# Patient Record
Sex: FEMALE | Race: WHITE | NOT HISPANIC OR LATINO | URBAN - METROPOLITAN AREA
[De-identification: names, ages, dates, MRNs, and addresses within clinical notes are randomized per-mention and may not be internally consistent; named-entity substitution may affect disease eponyms.]

---

## 2018-04-12 ENCOUNTER — INPATIENT (INPATIENT)
Facility: HOSPITAL | Age: 78
LOS: 3 days | Discharge: HOME | DRG: 872 | End: 2018-04-16
Attending: INTERNAL MEDICINE | Admitting: INTERNAL MEDICINE
Payer: MEDICARE

## 2018-04-12 VITALS
RESPIRATION RATE: 18 BRPM | OXYGEN SATURATION: 95 % | TEMPERATURE: 100 F | DIASTOLIC BLOOD PRESSURE: 69 MMHG | SYSTOLIC BLOOD PRESSURE: 114 MMHG | HEART RATE: 104 BPM

## 2018-04-12 DIAGNOSIS — Z29.9 ENCOUNTER FOR PROPHYLACTIC MEASURES, UNSPECIFIED: ICD-10-CM

## 2018-04-12 DIAGNOSIS — A41.9 SEPSIS, UNSPECIFIED ORGANISM: ICD-10-CM

## 2018-04-12 DIAGNOSIS — N39.0 URINARY TRACT INFECTION, SITE NOT SPECIFIED: ICD-10-CM

## 2018-04-12 DIAGNOSIS — E03.9 HYPOTHYROIDISM, UNSPECIFIED: ICD-10-CM

## 2018-04-12 DIAGNOSIS — Z90.49 ACQUIRED ABSENCE OF OTHER SPECIFIED PARTS OF DIGESTIVE TRACT: Chronic | ICD-10-CM

## 2018-04-12 LAB
ALBUMIN SERPL ELPH-MCNC: 3.6 G/DL — SIGNIFICANT CHANGE UP (ref 3.5–5)
ALP SERPL-CCNC: 69 U/L — SIGNIFICANT CHANGE UP (ref 40–120)
ALT FLD-CCNC: 33 U/L DA — SIGNIFICANT CHANGE UP (ref 10–60)
ANION GAP SERPL CALC-SCNC: 9 MMOL/L — SIGNIFICANT CHANGE UP (ref 5–17)
APPEARANCE UR: ABNORMAL
AST SERPL-CCNC: 24 U/L — SIGNIFICANT CHANGE UP (ref 10–40)
BASOPHILS # BLD AUTO: 0.1 K/UL — SIGNIFICANT CHANGE UP (ref 0–0.2)
BASOPHILS NFR BLD AUTO: 0.4 % — SIGNIFICANT CHANGE UP (ref 0–2)
BILIRUB SERPL-MCNC: 0.9 MG/DL — SIGNIFICANT CHANGE UP (ref 0.2–1.2)
BILIRUB UR-MCNC: NEGATIVE — SIGNIFICANT CHANGE UP
BUN SERPL-MCNC: 16 MG/DL — SIGNIFICANT CHANGE UP (ref 7–18)
CALCIUM SERPL-MCNC: 8.6 MG/DL — SIGNIFICANT CHANGE UP (ref 8.4–10.5)
CHLORIDE SERPL-SCNC: 97 MMOL/L — SIGNIFICANT CHANGE UP (ref 96–108)
CO2 SERPL-SCNC: 26 MMOL/L — SIGNIFICANT CHANGE UP (ref 22–31)
COLOR SPEC: YELLOW — SIGNIFICANT CHANGE UP
CREAT SERPL-MCNC: 1.06 MG/DL — SIGNIFICANT CHANGE UP (ref 0.5–1.3)
DIFF PNL FLD: ABNORMAL
EOSINOPHIL # BLD AUTO: 0 K/UL — SIGNIFICANT CHANGE UP (ref 0–0.5)
EOSINOPHIL NFR BLD AUTO: 0 % — SIGNIFICANT CHANGE UP (ref 0–6)
GLUCOSE SERPL-MCNC: 128 MG/DL — HIGH (ref 70–99)
GLUCOSE UR QL: NEGATIVE — SIGNIFICANT CHANGE UP
HCT VFR BLD CALC: 38.5 % — SIGNIFICANT CHANGE UP (ref 34.5–45)
HGB BLD-MCNC: 13.1 G/DL — SIGNIFICANT CHANGE UP (ref 11.5–15.5)
KETONES UR-MCNC: ABNORMAL
LACTATE SERPL-SCNC: 1 MMOL/L — SIGNIFICANT CHANGE UP (ref 0.7–2)
LEUKOCYTE ESTERASE UR-ACNC: ABNORMAL
LYMPHOCYTES # BLD AUTO: 0.6 K/UL — LOW (ref 1–3.3)
LYMPHOCYTES # BLD AUTO: 3.8 % — LOW (ref 13–44)
MCHC RBC-ENTMCNC: 33.5 PG — SIGNIFICANT CHANGE UP (ref 27–34)
MCHC RBC-ENTMCNC: 34 GM/DL — SIGNIFICANT CHANGE UP (ref 32–36)
MCV RBC AUTO: 98.4 FL — SIGNIFICANT CHANGE UP (ref 80–100)
MONOCYTES # BLD AUTO: 1.6 K/UL — HIGH (ref 0–0.9)
MONOCYTES NFR BLD AUTO: 10 % — SIGNIFICANT CHANGE UP (ref 2–14)
NEUTROPHILS # BLD AUTO: 13.8 K/UL — HIGH (ref 1.8–7.4)
NEUTROPHILS NFR BLD AUTO: 85.9 % — HIGH (ref 43–77)
NITRITE UR-MCNC: POSITIVE
PH UR: 6 — SIGNIFICANT CHANGE UP (ref 5–8)
PLATELET # BLD AUTO: 192 K/UL — SIGNIFICANT CHANGE UP (ref 150–400)
POTASSIUM SERPL-MCNC: 3.6 MMOL/L — SIGNIFICANT CHANGE UP (ref 3.5–5.3)
POTASSIUM SERPL-SCNC: 3.6 MMOL/L — SIGNIFICANT CHANGE UP (ref 3.5–5.3)
PROT SERPL-MCNC: 7.2 G/DL — SIGNIFICANT CHANGE UP (ref 6–8.3)
PROT UR-MCNC: 30 MG/DL
RAPID RVP RESULT: SIGNIFICANT CHANGE UP
RBC # BLD: 3.92 M/UL — SIGNIFICANT CHANGE UP (ref 3.8–5.2)
RBC # FLD: 11.6 % — SIGNIFICANT CHANGE UP (ref 10.3–14.5)
SODIUM SERPL-SCNC: 132 MMOL/L — LOW (ref 135–145)
SP GR SPEC: 1 — LOW (ref 1.01–1.02)
T4 AB SER-ACNC: 10.5 UG/DL — SIGNIFICANT CHANGE UP (ref 4.6–12)
TSH SERPL-MCNC: 1.01 UU/ML — SIGNIFICANT CHANGE UP (ref 0.34–4.82)
UROBILINOGEN FLD QL: NEGATIVE — SIGNIFICANT CHANGE UP
WBC # BLD: 16.1 K/UL — HIGH (ref 3.8–10.5)
WBC # FLD AUTO: 16.1 K/UL — HIGH (ref 3.8–10.5)

## 2018-04-12 PROCEDURE — 71046 X-RAY EXAM CHEST 2 VIEWS: CPT | Mod: 26

## 2018-04-12 PROCEDURE — 99285 EMERGENCY DEPT VISIT HI MDM: CPT

## 2018-04-12 RX ORDER — ACETAMINOPHEN 500 MG
975 TABLET ORAL ONCE
Qty: 0 | Refills: 0 | Status: COMPLETED | OUTPATIENT
Start: 2018-04-12 | End: 2018-04-12

## 2018-04-12 RX ORDER — SODIUM CHLORIDE 9 MG/ML
3 INJECTION INTRAMUSCULAR; INTRAVENOUS; SUBCUTANEOUS ONCE
Qty: 0 | Refills: 0 | Status: COMPLETED | OUTPATIENT
Start: 2018-04-12 | End: 2018-04-12

## 2018-04-12 RX ORDER — SODIUM CHLORIDE 9 MG/ML
1000 INJECTION INTRAMUSCULAR; INTRAVENOUS; SUBCUTANEOUS ONCE
Qty: 0 | Refills: 0 | Status: COMPLETED | OUTPATIENT
Start: 2018-04-12 | End: 2018-04-12

## 2018-04-12 RX ORDER — ATORVASTATIN CALCIUM 80 MG/1
10 TABLET, FILM COATED ORAL AT BEDTIME
Qty: 0 | Refills: 0 | Status: DISCONTINUED | OUTPATIENT
Start: 2018-04-12 | End: 2018-04-16

## 2018-04-12 RX ORDER — ENOXAPARIN SODIUM 100 MG/ML
40 INJECTION SUBCUTANEOUS DAILY
Qty: 0 | Refills: 0 | Status: DISCONTINUED | OUTPATIENT
Start: 2018-04-13 | End: 2018-04-16

## 2018-04-12 RX ORDER — SODIUM CHLORIDE 9 MG/ML
1000 INJECTION INTRAMUSCULAR; INTRAVENOUS; SUBCUTANEOUS
Qty: 0 | Refills: 0 | Status: DISCONTINUED | OUTPATIENT
Start: 2018-04-12 | End: 2018-04-16

## 2018-04-12 RX ORDER — KETOROLAC TROMETHAMINE 30 MG/ML
15 SYRINGE (ML) INJECTION ONCE
Qty: 0 | Refills: 0 | Status: DISCONTINUED | OUTPATIENT
Start: 2018-04-12 | End: 2018-04-12

## 2018-04-12 RX ORDER — SODIUM CHLORIDE 9 MG/ML
500 INJECTION INTRAMUSCULAR; INTRAVENOUS; SUBCUTANEOUS
Qty: 0 | Refills: 0 | Status: COMPLETED | OUTPATIENT
Start: 2018-04-12 | End: 2018-04-12

## 2018-04-12 RX ORDER — LEVOTHYROXINE SODIUM 125 MCG
50 TABLET ORAL DAILY
Qty: 0 | Refills: 0 | Status: DISCONTINUED | OUTPATIENT
Start: 2018-04-12 | End: 2018-04-16

## 2018-04-12 RX ORDER — ACETAMINOPHEN 500 MG
650 TABLET ORAL EVERY 6 HOURS
Qty: 0 | Refills: 0 | Status: DISCONTINUED | OUTPATIENT
Start: 2018-04-12 | End: 2018-04-16

## 2018-04-12 RX ORDER — CEFTRIAXONE 500 MG/1
1 INJECTION, POWDER, FOR SOLUTION INTRAMUSCULAR; INTRAVENOUS ONCE
Qty: 0 | Refills: 0 | Status: COMPLETED | OUTPATIENT
Start: 2018-04-12 | End: 2018-04-12

## 2018-04-12 RX ADMIN — SODIUM CHLORIDE 100 MILLILITER(S): 9 INJECTION INTRAMUSCULAR; INTRAVENOUS; SUBCUTANEOUS at 22:26

## 2018-04-12 RX ADMIN — SODIUM CHLORIDE 2000 MILLILITER(S): 9 INJECTION INTRAMUSCULAR; INTRAVENOUS; SUBCUTANEOUS at 18:40

## 2018-04-12 RX ADMIN — SODIUM CHLORIDE 2000 MILLILITER(S): 9 INJECTION INTRAMUSCULAR; INTRAVENOUS; SUBCUTANEOUS at 19:44

## 2018-04-12 RX ADMIN — SODIUM CHLORIDE 2000 MILLILITER(S): 9 INJECTION INTRAMUSCULAR; INTRAVENOUS; SUBCUTANEOUS at 20:39

## 2018-04-12 RX ADMIN — CEFTRIAXONE 100 GRAM(S): 500 INJECTION, POWDER, FOR SOLUTION INTRAMUSCULAR; INTRAVENOUS at 19:34

## 2018-04-12 RX ADMIN — SODIUM CHLORIDE 2000 MILLILITER(S): 9 INJECTION INTRAMUSCULAR; INTRAVENOUS; SUBCUTANEOUS at 19:34

## 2018-04-12 RX ADMIN — Medication 975 MILLIGRAM(S): at 19:13

## 2018-04-12 RX ADMIN — SODIUM CHLORIDE 3 MILLILITER(S): 9 INJECTION INTRAMUSCULAR; INTRAVENOUS; SUBCUTANEOUS at 19:13

## 2018-04-12 RX ADMIN — SODIUM CHLORIDE 2000 MILLILITER(S): 9 INJECTION INTRAMUSCULAR; INTRAVENOUS; SUBCUTANEOUS at 19:18

## 2018-04-12 NOTE — H&P ADULT - PROBLEM SELECTOR PLAN 2
-Meets SIRS criteria with known UTI  -c/w Rocephin, f/u urine and blood culture result  -s/p 3L IV bolus in ED, c/w IV maintenance  -Tylenol PRN for fever -Meets SIRS criteria with known UTI  -c/w Levaquin(concern for cross reaction/allergy to Rocephin), f/u urine and blood culture result  -s/p 3L IV bolus in ED, c/w IV maintenance  -Tylenol PRN for fever

## 2018-04-12 NOTE — H&P ADULT - ASSESSMENT
Patient is a 77y old  Female who presents with a chief complaint of Chills and rigors, urinary burning sense (12 Apr 2018 22:32). Is admitted to the medicine floor for UTI sepsis.

## 2018-04-12 NOTE — H&P ADULT - PROBLEM SELECTOR PLAN 4
-IMPROVE VTE Individual Risk Assessment          RISK                                                          Points  [  ] Previous VTE                                                3  [  ] Thrombophilia                                             2  [  ] Lower limb paralysis                                   2        (unable to hold up >15 seconds)    [  ] Current Cancer                                            2         (within 6 months)  [ x ] Immobilization > 24 hrs                             1  [  ] ICU/CCU stay > 24 hours                           1  [ x ] Age > 60                                                       1  IMPROVE VTE Score ____2_____  -Lovenox subcu for daily for DVT PPx.

## 2018-04-12 NOTE — ED PROVIDER NOTE - OBJECTIVE STATEMENT
77 year old female came to the ED because of a fever. Approximately 1 week ago she noted discomfort on urination with urgency. 1 night ago she woke up with chills and continues to have some chills today and noted a fever at home and feels generally weak. No vomiting, no nausea, no abd pain, no cough, no chest pain, no sob, no headache.

## 2018-04-12 NOTE — H&P ADULT - HISTORY OF PRESENT ILLNESS
77 years old female from home, walks in dependently, lives with , with PMH of hypothyroidism, hyperlipidemia, and h/o bronchial pneumonia presented to ED c/o chills and rigors for 2 days, and 1 week of stinging urinary sense. Pt has been feeling tired and weak for the last 1 week. 2 nights ago she had shaking chills with fever(100.4F) which was mildly subsided, and last night symptoms came back again so she decided to come to the hospital. Denies chest pain, SOB, abdominal pain, diarrhea, cough, rhinorrhea, or any other symptoms.    In ED, pt had fever of 102F with rectal temp, tachycardia of 104, with borderline BP- s/p 3L NS bolus in ED. Labs significant for WBC of 16.1K with left shift, mild hyponatremia of 132, UA grossly positive. RVP; negative, CXR; emphysema. Is admitted to the medicine floor for UTI sepsis. Pt received 1g of IV Rocephin in ED. 77 years old female from home, walks in dependently, lives with , with PMH of hypothyroidism, hyperlipidemia, and h/o bronchial pneumonia presented to ED c/o chills and rigors for 2 days, and 1 week of stinging urinary sense. Pt has been feeling tired and weak for the last 1 week. 2 nights ago she had shaking chills with fever(100.4F) which was mildly subsided, and last night symptoms came back again so she decided to come to the hospital. Denies chest pain, SOB, abdominal pain, diarrhea, cough, rhinorrhea, or any other symptoms.    In ED, pt had fever of 102F with rectal temp, tachycardia of 104, with borderline BP- s/p 3L NS bolus in ED. Labs significant for WBC of 16.1K with left shift, mild hyponatremia of 132, UA grossly positive. RVP; negative, CXR; emphysema. Is admitted to the medicine floor for UTI sepsis. Pt received 1g of IV Rocephin in ED.    ** Primary team please complete med rec when  brings home med, pt does not know the dose of medications so meds were started at the lowest dose.

## 2018-04-12 NOTE — H&P ADULT - NSHPOUTPATIENTPROVIDERS_GEN_ALL_CORE
PMD in [facility], George Hernandez PMD in [facility], [facility]

## 2018-04-12 NOTE — H&P ADULT - PROBLEM SELECTOR PLAN 1
-Meets SIRS criteria with known UTI  -c/w Rocephin, f/u urine and blood culture result  -s/p 3L IV bolus in ED, c/w IV maintenance  -Check renal sono for mild Lt CVAT(+)  -Tylenol PRN for fever -Meets SIRS criteria with known UTI. s/p 1g Rocephin IV in ED.  -c/w Levaquin(concern for cross reaction/allergy to Rocephin), f/u urine and blood culture result  -s/p 3L IV bolus in ED, c/w IV maintenance  -Check renal sono for mild Lt CVAT(+)  -Tylenol PRN for fever

## 2018-04-12 NOTE — H&P ADULT - ATTENDING COMMENTS
Above  noted 77 years old female from home, walks in dependently, lives with , with PMH of hypothyroidism, hyperlipidemia, and h/o bronchial pneumonia presented to ED c/o chills and rigors for 2 days, and 1 week of stinging urinary sense. Pt has been feeling tired and weak for the last 1 week. 2 nights ago she had shaking chills with fever(100.4F) which was mildly subsided, and last night symptoms came back again so she decided to come to the hospital. Denies chest pain, SOB, abdominal pain, diarrhea, cough, rhinorrhea, or any other symptoms.    In ED, pt had fever of 102F with rectal temp, tachycardia of 104, with borderline BP- s/p 3L NS bolus in ED. Labs significant for WBC of 16.1K with left shift, mild hyponatremia of 132, UA grossly positive. RVP; negative, CXR; emphysema. Is admitted to the medicine floor for UTI sepsis. Pt received 1g of IV Rocephin in ED.  Blood  tests radiology report reviewed    Impression Sepsis  UTI    Hypotension Hyponatremia    Hypothyroidism   Emphysema  remission   HLD   Admit to the  floor  patient  hemodynamically stabilized after  fluids resuscitation and  IV Rocephine   follow  septic work up resume  home  meds  GI DVT prophylaxis  check abd  US

## 2018-04-12 NOTE — H&P ADULT - PROBLEM SELECTOR PLAN 3
-Check TSH, and continue home dose synthroid  -Primary team please check home dose of synthroid, empirically started at 50mcg.

## 2018-04-12 NOTE — H&P ADULT - NSHPPHYSICALEXAM_GEN_ALL_CORE
PHYSICAL EXAM:  GENERAL: NAD, well-groomed, well-developed  HEAD:  Atraumatic, Normocephalic  EYES: EOMI, PERRLA, conjunctiva and sclera clear  ENMT: No tonsillar erythema, exudates, or enlargement; Moist mucous membranes, Good dentition, No lesions  NECK: Supple, No JVD, Normal thyroid  NERVOUS SYSTEM:  Alert & Oriented X3, Good concentration; Motor Strength 5/5 B/L upper and lower extremities  CHEST/LUNG: Clear to percussion bilaterally; No rales, rhonchi, wheezing, or rubs  HEART: Regular rate and rhythm; No murmurs, rubs, or gallops  ABDOMEN: Soft, Nontender, Nondistended; Bowel sounds present/ Left CVA tenderness(+)  EXTREMITIES:  2+ Peripheral Pulses bilaterally, No clubbing, cyanosis, or edema  LYMPH: No lymphadenopathy noted  SKIN: No rashes or lesions    T(C): 36.8 (04-12-18 @ 20:23), Max: 38.9 (04-12-18 @ 18:57)  HR: 70 (04-12-18 @ 20:23) (70 - 104)  BP: 106/51 (04-12-18 @ 20:23) (106/51 - 114/69)  RR: 16 (04-12-18 @ 20:23) (16 - 18)  SpO2: 96% (04-12-18 @ 20:23) (95% - 96%)  Wt(kg): --  I&O's Summary

## 2018-04-12 NOTE — H&P ADULT - NSHPLABSRESULTS_GEN_ALL_CORE
LABS:                        13.1   16.1  )-----------( 192      ( 2018 19:03 )             38.5     04-12    132<L>  |  97  |  16  ----------------------------<  128<H>  3.6   |  26  |  1.06    Ca    8.6      2018 19:09    TPro  7.2  /  Alb  3.6  /  TBili  0.9  /  DBili  x   /  AST  24  /  ALT  33  /  AlkPhos  69  04-12      Urinalysis Basic - ( 2018 18:56 )    Color: Yellow / Appearance: Slightly Turbid / S.005 / pH: x  Gluc: x / Ketone: Small  / Bili: Negative / Urobili: Negative   Blood: x / Protein: 30 mg/dL / Nitrite: Positive   Leuk Esterase: Moderate / RBC: 0-2 /HPF / WBC >50 /HPF   Sq Epi: x / Non Sq Epi: Few /HPF / Bacteria: Moderate /HPF      CAPILLARY BLOOD GLUCOSE        LIVER FUNCTIONS - ( 2018 19:09 )  Alb: 3.6 g/dL / Pro: 7.2 g/dL / ALK PHOS: 69 U/L / ALT: 33 U/L DA / AST: 24 U/L / GGT: x    < from: Xray Chest 2 Views PA/Lat (18 @ 19:04) >      FINDINGS:  Lungs are hyperinflated.  No focal lung consolidation. Biapical pleural parenchymal scarring.  No pneumothorax or pleural effusion.   The cardiomediastinal silhouette is within normal limits in size.  Moderate levoscoliosis of the thoracolumbar spine with apex at T12-L1.    IMPRESSION:  No focal lung consolidation. Hyperinflation/emphysema.    < end of copied text >

## 2018-04-13 ENCOUNTER — TRANSCRIPTION ENCOUNTER (OUTPATIENT)
Age: 78
End: 2018-04-13

## 2018-04-13 DIAGNOSIS — R78.81 BACTEREMIA: ICD-10-CM

## 2018-04-13 DIAGNOSIS — E78.5 HYPERLIPIDEMIA, UNSPECIFIED: ICD-10-CM

## 2018-04-13 LAB
ANION GAP SERPL CALC-SCNC: 7 MMOL/L — SIGNIFICANT CHANGE UP (ref 5–17)
BUN SERPL-MCNC: 13 MG/DL — SIGNIFICANT CHANGE UP (ref 7–18)
CALCIUM SERPL-MCNC: 7.8 MG/DL — LOW (ref 8.4–10.5)
CHLORIDE SERPL-SCNC: 106 MMOL/L — SIGNIFICANT CHANGE UP (ref 96–108)
CHOLEST SERPL-MCNC: 84 MG/DL — SIGNIFICANT CHANGE UP (ref 10–199)
CO2 SERPL-SCNC: 24 MMOL/L — SIGNIFICANT CHANGE UP (ref 22–31)
CREAT SERPL-MCNC: 0.81 MG/DL — SIGNIFICANT CHANGE UP (ref 0.5–1.3)
E COLI DNA BLD POS QL NAA+NON-PROBE: SIGNIFICANT CHANGE UP
FOLATE SERPL-MCNC: >20 NG/ML — SIGNIFICANT CHANGE UP (ref 4.8–24.2)
GLUCOSE SERPL-MCNC: 110 MG/DL — HIGH (ref 70–99)
GRAM STN FLD: SIGNIFICANT CHANGE UP
HBA1C BLD-MCNC: 5.7 % — HIGH (ref 4–5.6)
HCT VFR BLD CALC: 31.9 % — LOW (ref 34.5–45)
HDLC SERPL-MCNC: 41 MG/DL — SIGNIFICANT CHANGE UP (ref 40–125)
HGB BLD-MCNC: 10.8 G/DL — LOW (ref 11.5–15.5)
LIPID PNL WITH DIRECT LDL SERPL: 35 MG/DL — SIGNIFICANT CHANGE UP
LYMPHOCYTES # BLD AUTO: 5 % — LOW (ref 13–44)
MAGNESIUM SERPL-MCNC: 2.2 MG/DL — SIGNIFICANT CHANGE UP (ref 1.6–2.6)
MCHC RBC-ENTMCNC: 33.5 PG — SIGNIFICANT CHANGE UP (ref 27–34)
MCHC RBC-ENTMCNC: 33.8 GM/DL — SIGNIFICANT CHANGE UP (ref 32–36)
MCV RBC AUTO: 99.1 FL — SIGNIFICANT CHANGE UP (ref 80–100)
METHOD TYPE: SIGNIFICANT CHANGE UP
MONOCYTES NFR BLD AUTO: 12 % — SIGNIFICANT CHANGE UP (ref 2–14)
NEUTROPHILS NFR BLD AUTO: 83 % — HIGH (ref 43–77)
PHOSPHATE SERPL-MCNC: 2.1 MG/DL — LOW (ref 2.5–4.5)
PLATELET # BLD AUTO: 162 K/UL — SIGNIFICANT CHANGE UP (ref 150–400)
POTASSIUM SERPL-MCNC: 3.6 MMOL/L — SIGNIFICANT CHANGE UP (ref 3.5–5.3)
POTASSIUM SERPL-SCNC: 3.6 MMOL/L — SIGNIFICANT CHANGE UP (ref 3.5–5.3)
RBC # BLD: 3.22 M/UL — LOW (ref 3.8–5.2)
RBC # FLD: 11.8 % — SIGNIFICANT CHANGE UP (ref 10.3–14.5)
SODIUM SERPL-SCNC: 137 MMOL/L — SIGNIFICANT CHANGE UP (ref 135–145)
SPECIMEN SOURCE: SIGNIFICANT CHANGE UP
TOTAL CHOLESTEROL/HDL RATIO MEASUREMENT: 2 RATIO — LOW (ref 3.3–7.1)
TRIGL SERPL-MCNC: 39 MG/DL — SIGNIFICANT CHANGE UP (ref 10–149)
TSH SERPL-MCNC: 1.92 UU/ML — SIGNIFICANT CHANGE UP (ref 0.34–4.82)
VIT B12 SERPL-MCNC: 587 PG/ML — SIGNIFICANT CHANGE UP (ref 232–1245)
WBC # BLD: 13.4 K/UL — HIGH (ref 3.8–10.5)
WBC # FLD AUTO: 13.4 K/UL — HIGH (ref 3.8–10.5)

## 2018-04-13 PROCEDURE — 76775 US EXAM ABDO BACK WALL LIM: CPT | Mod: 26

## 2018-04-13 RX ORDER — VANCOMYCIN HCL 1 G
1000 VIAL (EA) INTRAVENOUS ONCE
Qty: 0 | Refills: 0 | Status: COMPLETED | OUTPATIENT
Start: 2018-04-13 | End: 2018-04-13

## 2018-04-13 RX ORDER — IPRATROPIUM/ALBUTEROL SULFATE 18-103MCG
3 AEROSOL WITH ADAPTER (GRAM) INHALATION EVERY 6 HOURS
Qty: 0 | Refills: 0 | Status: DISCONTINUED | OUTPATIENT
Start: 2018-04-13 | End: 2018-04-13

## 2018-04-13 RX ORDER — IBUPROFEN 200 MG
200 TABLET ORAL ONCE
Qty: 0 | Refills: 0 | Status: COMPLETED | OUTPATIENT
Start: 2018-04-13 | End: 2018-04-13

## 2018-04-13 RX ORDER — IPRATROPIUM/ALBUTEROL SULFATE 18-103MCG
3 AEROSOL WITH ADAPTER (GRAM) INHALATION EVERY 6 HOURS
Qty: 0 | Refills: 0 | Status: DISCONTINUED | OUTPATIENT
Start: 2018-04-13 | End: 2018-04-14

## 2018-04-13 RX ORDER — AZTREONAM 2 G
1000 VIAL (EA) INJECTION ONCE
Qty: 0 | Refills: 0 | Status: COMPLETED | OUTPATIENT
Start: 2018-04-13 | End: 2018-04-13

## 2018-04-13 RX ORDER — AZTREONAM 2 G
VIAL (EA) INJECTION
Qty: 0 | Refills: 0 | Status: DISCONTINUED | OUTPATIENT
Start: 2018-04-13 | End: 2018-04-13

## 2018-04-13 RX ORDER — ACETAMINOPHEN 500 MG
1000 TABLET ORAL ONCE
Qty: 0 | Refills: 0 | Status: COMPLETED | OUTPATIENT
Start: 2018-04-13 | End: 2018-04-13

## 2018-04-13 RX ORDER — AZTREONAM 2 G
1000 VIAL (EA) INJECTION EVERY 8 HOURS
Qty: 0 | Refills: 0 | Status: DISCONTINUED | OUTPATIENT
Start: 2018-04-13 | End: 2018-04-16

## 2018-04-13 RX ADMIN — Medication 50 MILLIGRAM(S): at 21:41

## 2018-04-13 RX ADMIN — Medication 200 MILLIGRAM(S): at 06:07

## 2018-04-13 RX ADMIN — Medication 650 MILLIGRAM(S): at 02:49

## 2018-04-13 RX ADMIN — ATORVASTATIN CALCIUM 10 MILLIGRAM(S): 80 TABLET, FILM COATED ORAL at 21:41

## 2018-04-13 RX ADMIN — Medication 250 MILLIGRAM(S): at 12:16

## 2018-04-13 RX ADMIN — Medication 50 MILLIGRAM(S): at 13:37

## 2018-04-13 RX ADMIN — Medication 50 MICROGRAM(S): at 05:39

## 2018-04-13 RX ADMIN — ENOXAPARIN SODIUM 40 MILLIGRAM(S): 100 INJECTION SUBCUTANEOUS at 12:16

## 2018-04-13 RX ADMIN — Medication 200 MILLIGRAM(S): at 07:11

## 2018-04-13 RX ADMIN — Medication 400 MILLIGRAM(S): at 07:39

## 2018-04-13 NOTE — CONSULT NOTE ADULT - PROBLEM SELECTOR RECOMMENDATION 2
1- UA & CS.  2- Blood culture.  3- Renal and bladder sonogram result is noted.  4- Azactam1 gm IVPB q 8 hours.  5- IV and PO hydration.  6- CBC and BMP follow up.

## 2018-04-13 NOTE — DISCHARGE NOTE ADULT - HOSPITAL COURSE
77 years old female from home, walks in [facility], lives with , with PMH of hypothyroidism, hyperlipidemia, and h/o bronchial pneumonia presented to ED 4/12/18 c/o chills and rigors for 2 days, and 1 week of stinging urinary sense. Pt had been feeling tired and weak for 1 week. 2 nights prior she had shaking chills with fever(100.4F) which was mildly subsided, and the night before admission the chills came back again so she decided to come to the hospital. Denies chest pain, SOB, abdominal pain, diarrhea, cough, rhinorrhea, or any other symptoms.    ED course:  In ED, pt had fever of 102F with rectal temp, tachycardia of 104, with borderline BP- s/p 3L NS bolus in ED. Labs significant for WBC of 16.1K with left shift, mild hyponatremia of 132. Pt received 1g of IV Rocephin in ED.    Hospital Course:  Patient was admitted for sepsis secondary to UTI with suspected pyelonephritis. Her UA showed leukocyte esterase, WBC and nitrites. Her RVP was negative and her CXR showed no consolidation but was significant for emphysema. She had a renal ultrasound that was unremarkable. She was started on levofloxacin and then switched to vancomycin and aztreonam to broaden coverage because patient was continuing to spike fevers. Her blood cultures grew E. Coli.   Pt has history of hypothyroid, her TSH was 1.01 on admission and her home dose of Synthroid was continued.   Pt has history of hyperlipidemia and her home dose of atorvastatin 20 mg was continued. Lipid panel was wnl. 77 years old female from home, walks in dependently, lives with , with PMH of hypothyroidism, hyperlipidemia, and h/o bronchial pneumonia presented to ED 4/12/18 c/o chills and rigors for 2 days, and 1 week of stinging urinary sense. Pt had been feeling tired and weak for 1 week. 2 nights prior she had shaking chills with fever(100.4F) which was mildly subsided, and the night before admission the chills came back again so she decided to come to the hospital. Denies chest pain, SOB, abdominal pain, diarrhea, cough, rhinorrhea, or any other symptoms.    ED course:  In ED, pt had fever of 102F with rectal temp, tachycardia of 104, with borderline BP- s/p 3L NS bolus in ED. Labs significant for WBC of 16.1K with left shift, mild hyponatremia of 132. Pt received 1g of IV Rocephin in ED.    Hospital Course:  Patient was admitted for sepsis secondary to UTI with suspected pyelonephritis. Her UA showed leukocyte esterase, WBC and nitrites. Her RVP was negative and her CXR showed no consolidation but was significant for emphysema. She had a renal ultrasound that was unremarkable. She was started on levofloxacin and then switched to aztreonam to broaden coverage because patient was continuing to spike fevers. Her blood cultures grew E. Coli.   E. coli was haley sensitive. Patient will be discharged on cipro 500 q12 to complete 2 weeks of antibiotics.   Pt has history of hypothyroid, her TSH was 1.01 on admission and her home dose of Synthroid was continued.   Pt has history of hyperlipidemia and her home dose of atorvastatin 20 mg was continued. Lipid panel was wnl.    After stabilization , decision was made to discharge the patient.

## 2018-04-13 NOTE — DISCHARGE NOTE ADULT - CARE PLAN
Principal Discharge DX:	Sepsis  Goal:	Resolution of infection  Assessment and plan of treatment:	You came in with sepsis , likely secondary to UTI. Clinically you had tenderness on costovertebral angle and a positive Ua and were presumed to have pyelonephritis. You were started on Levaquin, then changed to broader coverage , aztreonam and vancomycin. Blood cultures were positive for ___ . You completed course of iv antibiotics. Continue with ___ on discharge .  Repeat blood cultures were negative.  Follow up with PCP in 1 week of discharge.  Secondary Diagnosis:	Hypothyroid  Assessment and plan of treatment:	Continue with home dose.  Tsh wnl.  Secondary Diagnosis:	HLD (hyperlipidemia)  Assessment and plan of treatment:	Continue with statin at home dose.  Lipid profile wnl. Principal Discharge DX:	Sepsis  Goal:	Resolution of infection  Assessment and plan of treatment:	You came in with sepsis , likely secondary to UTI. Clinically you had tenderness on costovertebral angle and a positive Ua and were presumed to have pyelonephritis. You were started on Levaquin, then changed to broader coverage , aztreonam and vancomycin. Blood cultures were positive for E. coli . You completed course of iv antibiotics. Continue with ciprofloxacin 500 q12 on discharge till 4/25 .  Repeat blood cultures were negative.  Follow up with PCP in 1 week of discharge.  Secondary Diagnosis:	Hypothyroid  Assessment and plan of treatment:	Continue with home dose.  Tsh wnl.  Secondary Diagnosis:	HLD (hyperlipidemia)  Assessment and plan of treatment:	Continue with statin at home dose.  Lipid profile wnl.

## 2018-04-13 NOTE — DISCHARGE NOTE ADULT - PATIENT PORTAL LINK FT
You can access the [facility], offered by [facility], by registering with the following website: [email or web address]

## 2018-04-13 NOTE — CONSULT NOTE ADULT - PROBLEM SELECTOR RECOMMENDATION 9
1- Repeat blood cultures.  2- Continue Azactam 1 gm IVPB q 8 hours.  3- Follow blood cultures till final report.

## 2018-04-13 NOTE — CONSULT NOTE ADULT - SUBJECTIVE AND OBJECTIVE BOX
HPI:  77 years old female from home, walks in dependently, lives with , with PMH of hypothyroidism, hyperlipidemia, and h/o bronchial pneumonia presented to ED c/o chills and rigors for 2 days, and 1 week of stinging urinary sense. Pt has been feeling tired and weak for the last 1 week. 2 nights ago she had shaking chills with fever(100.4F) which was mildly subsided, and last night symptoms came back again so she decided to come to the hospital. Denies chest pain, SOB, abdominal pain, diarrhea, cough, rhinorrhea, or any other symptoms.    In ED, pt had fever of 102F with rectal temp, tachycardia of 104, with borderline BP- s/p 3L NS bolus in ED. Labs significant for WBC of 16.1K with left shift, mild hyponatremia of 132, UA grossly positive. RVP; negative, CXR; emphysema. Is admitted to the medicine floor for UTI sepsis. Pt received 1g of IV Rocephin in ED.    ** Primary team please complete med rec when  brings home med, pt does not know the dose of medications so meds were started at the lowest dose. (2018 22:32)      PAST MEDICAL & SURGICAL HISTORY:  Pneumonia  Hypothyroid  S/P appendectomy      amoxicillin (Rash)      acetaminophen   Tablet 650 milliGRAM(s) Oral every 6 hours PRN  ALBUTerol/ipratropium for Nebulization 3 milliLiter(s) Nebulizer every 6 hours  atorvastatin 10 milliGRAM(s) Oral at bedtime  aztreonam  IVPB 1000 milliGRAM(s) IV Intermittent every 8 hours  enoxaparin Injectable 40 milliGRAM(s) SubCutaneous daily  levothyroxine 50 MICROGram(s) Oral daily  sodium chloride 0.9%. 1000 milliLiter(s) IV Continuous <Continuous>      Social Hx:    FAMILY HISTORY:  Family history of emphysema (Mother)        ROS  [  ] UNABLE TO ELICIT    General:  [  ] None  [  ] Fever  [  ] Chills  [  ] Malaise    Skin:  [  ] None [  ] Rash  [  ] Wound  [  ] Ulcer    HEENT:  [  ] None  [  ] Sore Throat  [  ] Nasal congestion/ runny nose  [  ] Photophobia [  ] Neck pain      Chest:  [  ] None   [  ] SOB  [  ] Cough  [  ] None    Cardiovascular:   [  ] None  [  ] CP  [  ] Palpitation    Gastrointestinal:  [  ] None  [  ] Abd pain   [  ] Nausea    [  ] Vomiting   [  ] Diarrhea	     Genitourinary:  [  ] None [  ] Polyuria   [  ] Urgency  [  ] Frequency  [  ] Dysuria    [  ]  Hematuria       Musculoskeletal:  [  ] None [  ] Back Pain	[  ] Body aches  [  ] Joint pain    Neurological: [  ] None [  ]Dizziness  [  ]Visual Disturbance  [  ]Headaches   [  ] Weakness      PHYSICAL EXAM:    Vital Signs Last 24 Hrs  T(C): 37.3 (2018 12:04), Max: 38.9 (2018 18:57)  T(F): 99.1 (2018 12:04), Max: 102 (2018 18:57)  HR: 66 (2018 07:47) (66 - 104)  BP: 103/55 (2018 07:47) (100/55 - 121/51)  BP(mean): --  RR: 16 (2018 07:47) (16 - 18)  SpO2: 97% (2018 07:47) (95% - 99%)    Constitutional:    HEENT: [  ] Wnl  [  ] Pharyngeal congestion    Neck:  [  ] Supple  [  ]Lymphadenopathy  [  ] No JVD   [  ] JVD  [  ] Masses   [  ] WNL    CHEST/Respiratory:  [  ]Clear to auscultation  [  ] Rales   [  ] Rhonchi   [  ] Wheezing     [  ] Chest Tenderness      Cardiovascular:  [  ] Reg S1 S2   [  ] Irreg S1 S2   [  ]No Murmur  [  ] +ve Murmurs  [  ]Systolic [  ]Diastolic      Abdomen:  [  ] Soft  [  ] No tendrerness  [  ] Tenderness  [  ] Organomegaly  [  ] ABD Distention  [  ] Rigidity                       [  ] No Regidity                       [  ] No Rebound Tenderness  [  ] No Guarding Rigidity  [  ] Rebound Tenderness[  ] Guarding Rigidity                          [  ]  +ve Bowel Sounds  [  ] Decreased Bowel Sounds    [  ] Absent Bowel Sounds                            Extremities: [  ] No edema [  ] Edema  [  ] Clubbing   [  ] Cyanosis                         [  ] No Tender Calf muscles  [  ] Tender Calf muscles                        [  ] Palpable peripheral pulses    Neurological: [  ] Awake  [  ] Alert  [  ] Oriented  x                             [  ] Confused  [  ] Drowsy  [  ] respond to painful stimuli  [  ] Unresponsive    Skin:  [  ] Intact [  ] Redness [  ] Thrombophlebitis  [  ] Rashes  [  ] Dry  [  ] Ulcers    Ortho:  [  ] Joint Swelling  [  ] Joint erythema [  ] Joint tenderness                [  ] Increased temp. to touch  [  ] DJD [  ] WNL      LABS/DIAGNOSTIC TESTS                          10.8   13.4  )-----------( 162      ( 2018 06:37 )             31.9     WBC Count: 13.4 K/uL ( @ 06:37)  WBC Count: 16.1 K/uL ( @ 19:03)          137  |  106  |  13  ----------------------------<  110<H>  3.6   |  24  |  0.81    Ca    7.8<L>      2018 06:37  Phos  2.1       Mg     2.2         TPro  7.2  /  Alb  3.6  /  TBili  0.9  /  DBili  x   /  AST  24  /  ALT  33  /  AlkPhos  69        Urinalysis Basic - ( 2018 18:56 )    Color: Yellow / Appearance: Slightly Turbid / S.005 / pH: x  Gluc: x / Ketone: Small  / Bili: Negative / Urobili: Negative   Blood: x / Protein: 30 mg/dL / Nitrite: Positive   Leuk Esterase: Moderate / RBC: 0-2 /HPF / WBC >50 /HPF   Sq Epi: x / Non Sq Epi: Few /HPF / Bacteria: Moderate /HPF        LIVER FUNCTIONS - ( 2018 19:09 )  Alb: 3.6 g/dL / Pro: 7.2 g/dL / ALK PHOS: 69 U/L / ALT: 33 U/L DA / AST: 24 U/L / GGT: x                 LACTATE:Lactate, Blood: 1.0 mmol/L ( @ 19:08)        CULTURES:   Culture - Blood (18 @ 23:34)    Gram Stain:   Growth in aerobic bottle: Gram Negative Rods  Growth in anaerobic bottle: Gram Negative Rods    -  Escherichia coli: Detec    Specimen Source: .Blood Blood-Peripheral    Organism: Blood Culture PCR    Culture Results:   Growth in aerobic bottle: Gram Negative Rods  "Due to technical problems, Proteus sp. will Not be reported as part of  the BCID panel until further notice"  ***Blood Panel PCR results on this specimen are available  approximately 3 hours after the Gram stain result.***  Gram stain, PCR, and/or culture results may not always  correspond due to difference in methodologies.  ************************************************************  This PCR assay was performed using [employer].  The following targets are tested for: Enterococcus,  vancomycin resistant enterococci, Listeria monocytogenes,  coagulase negative staphylococci, S. aureus,  methicillin resistant S. aureus, Streptococcus agalactiae  (Group B), S. pneumoniae, S. pyogenes (Group A),  Acinetobacter baumannii, Enterobacter cloacae, E. coli,  Klebsiella oxytoca, K. pneumoniae, Proteus sp.,  Serratia marcescens, Haemophilus influenzae,  Neisseria meningitidis, Pseudomonas aeruginosa, Candida  albicans, C. glabrata, C krusei, C parapsilosis,  C. tropicalis and the KPC resistance gene.  Growth in anaerobic bottle: Gram Negative Rods    Organism Identification: Blood Culture PCR    Method Type: PCR    Culture - Blood (18 @ 23:34)    Gram Stain:   Growth in aerobic bottle: Gram Negative Rods    Specimen Source: .Blood Blood-Peripheral    Culture Results:   Growth in aerobic bottle: Gram Negative Rods        RADIOLOGY    CXR:    EXAM:  XR CHEST PA LAT 2V                            PROCEDURE DATE:  2018          INTERPRETATION:  PA AND LATERAL CHEST X-RAYS    HISTORY: fever.    COMPARISON: None.    FINDINGS:  Lungs are hyperinflated.  No focal lung consolidation. Biapical pleural parenchymal scarring.  No pneumothorax or pleural effusion.   The cardiomediastinal silhouette is within normal limits in size.  Moderate levoscoliosis of the thoracolumbar spine with apex at T12-L1.    IMPRESSION:  No focal lung consolidation. Hyperinflation/emphysema.          EXAM:  US KIDNEY(S)                            PROCEDURE DATE:  2018          INTERPRETATION:  EXAM: US KIDNEY(S)   INDICATION: left CVA tenderness. Sepsis.  COMPARISON: None.    TECHNIQUE: Grayscale ultrasound of the kidneys was performed.    FINDINGS:    Right kidney: Normal size, measures 12.8 x 5.1 x 5.8 cm. No   hydronephrosis, shadowing calculus, or perinephric fluid collection. Tiny   7 mm round anechoic structure in the lower pole may represent a cyst    Left kidney: Normal size, measures 12.3 x 4.3 x 4.9 cm. No   hydronephrosis, shadowing calculus, or perinephric fluid collection.    Moderately distended urinary bladder is grossly unremarkable.    Visualized proximal abdominal aorta and IVC are grossly unremarkable.    IMPRESSION:   Possible tiny subcentimeter right renal cyst.    No hydronephrosis. HPI:  77 years old female from home, walks in dependently, lives with , with PMH of hypothyroidism, hyperlipidemia, and h/o bronchial pneumonia presented to ED c/o chills and rigors for 2 days, and 1 week of stinging urinary sense. Pt has been feeling tired and weak for the last 1 week. 2 nights ago she had shaking chills with fever(100.4F) which was mildly subsided, and last night symptoms came back again so she decided to come to the hospital. Denies chest pain, SOB, abdominal pain, diarrhea, cough, rhinorrhea, or any other symptoms.    In ED, pt had fever of 102F with rectal temp, tachycardia of 104, with borderline BP- s/p 3L NS bolus in ED. Labs significant for WBC of 16.1K with left shift, mild hyponatremia of 132, UA grossly positive. RVP; negative, CXR; emphysema. Is admitted to the medicine floor for UTI sepsis. Pt received 1g of IV Rocephin in ED.    ** Primary team please complete med rec when  brings home med, pt does not know the dose of medications so meds were started at the lowest dose. (2018 22:32)      PAST MEDICAL & SURGICAL HISTORY:  Pneumonia  Hypothyroid  S/P appendectomy      amoxicillin (Rash)      acetaminophen   Tablet 650 milliGRAM(s) Oral every 6 hours PRN  ALBUTerol/ipratropium for Nebulization 3 milliLiter(s) Nebulizer every 6 hours  atorvastatin 10 milliGRAM(s) Oral at bedtime  aztreonam  IVPB 1000 milliGRAM(s) IV Intermittent every 8 hours  enoxaparin Injectable 40 milliGRAM(s) SubCutaneous daily  levothyroxine 50 MICROGram(s) Oral daily  sodium chloride 0.9%. 1000 milliLiter(s) IV Continuous <Continuous>      Social Hx:    FAMILY HISTORY:  Family history of emphysema (Mother)        ROS  [  ] UNABLE TO ELICIT    General:  [  ] None  [ x ] Fever  [ x ] Chills  [  ] Malaise    Skin:  [ x ] None [  ] Rash  [  ] Wound  [  ] Ulcer    HEENT:  [ x ] None  [  ] Sore Throat  [  ] Nasal congestion/ runny nose  [  ] Photophobia [  ] Neck pain      Chest:  [ x ] None   [  ] SOB  [  ] Cough  [  ] None    Cardiovascular:   [ x ] None  [  ] CP  [  ] Palpitation    Gastrointestinal:  [ x ] None  [  ] Abd pain   [  ] Nausea    [  ] Vomiting   [  ] Diarrhea	     Genitourinary:  [  ] None [  ] Polyuria   [  ] Urgency  [  ] Frequency  [ x ] Dysuria    [  ]  Hematuria       Musculoskeletal:  [  ] None [  ] Back Pain	[  ] Body aches  [  ] Joint pain [ x ] Weakness    Neurological: [  ] None [  ]Dizziness  [  ]Visual Disturbance  [  ]Headaches   [ x ] Weakness      PHYSICAL EXAM:    Vital Signs Last 24 Hrs  T(C): 37.3 (2018 12:04), Max: 38.9 (2018 18:57)  T(F): 99.1 (2018 12:04), Max: 102 (2018 18:57)  HR: 66 (2018 07:47) (66 - 104)  BP: 103/55 (2018 07:47) (100/55 - 121/51)  BP(mean): --  RR: 16 (2018 07:47) (16 - 18)  SpO2: 97% (2018 07:47) (95% - 99%)    Constitutional:    HEENT: [ x ] Wnl  [  ] Pharyngeal congestion    Neck:  [ x ] Supple  [  ]Lymphadenopathy  [  ] No JVD   [  ] JVD  [  ] Masses   [  ] WNL    CHEST/Respiratory:  [ x ]Clear to auscultation  [  ] Rales   [  ] Rhonchi   [  ] Wheezing     [  ] Chest Tenderness      Cardiovascular:  [ x ] Reg S1 S2   [  ] Irreg S1 S2   [ x ]No Murmur  [  ] +ve Murmurs  [  ]Systolic [  ]Diastolic      Abdomen:  [ x ] Soft  [ x ] No tendrerness  [  ] Tenderness  [  ] Organomegaly  [  ] ABD Distention  [  ] Rigidity                       [ x ] No Regidity                       [ x ] No Rebound Tenderness  [  ] No Guarding Rigidity  [  ] Rebound Tenderness[  ] Guarding Rigidity                          [ x ]  +ve Bowel Sounds  [  ] Decreased Bowel Sounds    [  ] Absent Bowel Sounds                            Extremities: [ x ] No edema [  ] Edema  [  ] Clubbing   [  ] Cyanosis                         [ x ] No Tender Calf muscles  [  ] Tender Calf muscles                        [ x ] Palpable peripheral pulses    Neurological: [ x ] Awake  [ x ] Alert  [ x ] Oriented  x  3                           [  ] Confused  [  ] Drowsy  [  ] respond to painful stimuli  [  ] Unresponsive    Skin:  [ x ] Intact [  ] Redness [  ] Thrombophlebitis  [  ] Rashes  [  ] Dry  [  ] Ulcers    Ortho:  [  ] Joint Swelling  [  ] Joint erythema [  ] Joint tenderness                [  ] Increased temp. to touch  [  ] DJD [ x ] WNL      LABS/DIAGNOSTIC TESTS                          10.8   13.4  )-----------( 162      ( 2018 06:37 )             31.9     WBC Count: 13.4 K/uL ( @ 06:37)  WBC Count: 16.1 K/uL ( @ 19:03)          137  |  106  |  13  ----------------------------<  110<H>  3.6   |  24  |  0.81    Ca    7.8<L>      2018 06:37  Phos  2.1       Mg     2.2         TPro  7.2  /  Alb  3.6  /  TBili  0.9  /  DBili  x   /  AST  24  /  ALT  33  /  AlkPhos  69        Urinalysis Basic - ( 2018 18:56 )    Color: Yellow / Appearance: Slightly Turbid / S.005 / pH: x  Gluc: x / Ketone: Small  / Bili: Negative / Urobili: Negative   Blood: x / Protein: 30 mg/dL / Nitrite: Positive   Leuk Esterase: Moderate / RBC: 0-2 /HPF / WBC >50 /HPF   Sq Epi: x / Non Sq Epi: Few /HPF / Bacteria: Moderate /HPF        LIVER FUNCTIONS - ( 2018 19:09 )  Alb: 3.6 g/dL / Pro: 7.2 g/dL / ALK PHOS: 69 U/L / ALT: 33 U/L DA / AST: 24 U/L / GGT: x                 LACTATE:Lactate, Blood: 1.0 mmol/L ( @ 19:08)        CULTURES:   Culture - Blood (18 @ 23:34)    Gram Stain:   Growth in aerobic bottle: Gram Negative Rods  Growth in anaerobic bottle: Gram Negative Rods    -  Escherichia coli: Detec    Specimen Source: .Blood Blood-Peripheral    Organism: Blood Culture PCR    Culture Results:   Growth in aerobic bottle: Gram Negative Rods  "Due to technical problems, Proteus sp. will Not be reported as part of  the BCID panel until further notice"  ***Blood Panel PCR results on this specimen are available  approximately 3 hours after the Gram stain result.***  Gram stain, PCR, and/or culture results may not always  correspond due to difference in methodologies.  ************************************************************  This PCR assay was performed using [employer].  The following targets are tested for: Enterococcus,  vancomycin resistant enterococci, Listeria monocytogenes,  coagulase negative staphylococci, S. aureus,  methicillin resistant S. aureus, Streptococcus agalactiae  (Group B), S. pneumoniae, S. pyogenes (Group A),  Acinetobacter baumannii, Enterobacter cloacae, E. coli,  Klebsiella oxytoca, K. pneumoniae, Proteus sp.,  Serratia marcescens, Haemophilus influenzae,  Neisseria meningitidis, Pseudomonas aeruginosa, Candida  albicans, C. glabrata, C krusei, C parapsilosis,  C. tropicalis and the KPC resistance gene.  Growth in anaerobic bottle: Gram Negative Rods    Organism Identification: Blood Culture PCR    Method Type: PCR    Culture - Blood (18 @ 23:34)    Gram Stain:   Growth in aerobic bottle: Gram Negative Rods    Specimen Source: .Blood Blood-Peripheral    Culture Results:   Growth in aerobic bottle: Gram Negative Rods        RADIOLOGY    CXR:    EXAM:  XR CHEST PA LAT 2V                            PROCEDURE DATE:  2018          INTERPRETATION:  PA AND LATERAL CHEST X-RAYS    HISTORY: fever.    COMPARISON: None.    FINDINGS:  Lungs are hyperinflated.  No focal lung consolidation. Biapical pleural parenchymal scarring.  No pneumothorax or pleural effusion.   The cardiomediastinal silhouette is within normal limits in size.  Moderate levoscoliosis of the thoracolumbar spine with apex at T12-L1.    IMPRESSION:  No focal lung consolidation. Hyperinflation/emphysema.          EXAM:  US KIDNEY(S)                            PROCEDURE DATE:  2018          INTERPRETATION:  EXAM: US KIDNEY(S)   INDICATION: left CVA tenderness. Sepsis.  COMPARISON: None.    TECHNIQUE: Grayscale ultrasound of the kidneys was performed.    FINDINGS:    Right kidney: Normal size, measures 12.8 x 5.1 x 5.8 cm. No   hydronephrosis, shadowing calculus, or perinephric fluid collection. Tiny   7 mm round anechoic structure in the lower pole may represent a cyst    Left kidney: Normal size, measures 12.3 x 4.3 x 4.9 cm. No   hydronephrosis, shadowing calculus, or perinephric fluid collection.    Moderately distended urinary bladder is grossly unremarkable.    Visualized proximal abdominal aorta and IVC are grossly unremarkable.    IMPRESSION:   Possible tiny subcentimeter right renal cyst.    No hydronephrosis.

## 2018-04-13 NOTE — CONSULT NOTE ADULT - ASSESSMENT
77 years old female from home, walks in dependently, lives with , with PMH of hypothyroidism, hyperlipidemia, and h/o bronchial pneumonia presented to ED c/o chills and rigors for 2 days, and 1 week of stinging urinary sense.  Patient was started on Levaquin, that was subsequently changed to Azactam.  Blood culture grew E coli.

## 2018-04-13 NOTE — DISCHARGE NOTE ADULT - PLAN OF CARE
Resolution of infection You came in with sepsis , likely secondary to UTI. Clinically you had tenderness on costovertebral angle and a positive Ua and were presumed to have pyelonephritis. You were started on Levaquin, then changed to broader coverage , aztreonam and vancomycin. Blood cultures were positive for ___ . You completed course of iv antibiotics. Continue with ___ on discharge .  Repeat blood cultures were negative.  Follow up with PCP in 1 week of discharge. Continue with home dose.  Tsh wnl. Continue with statin at home dose.  Lipid profile wnl. You came in with sepsis , likely secondary to UTI. Clinically you had tenderness on costovertebral angle and a positive Ua and were presumed to have pyelonephritis. You were started on Levaquin, then changed to broader coverage , aztreonam and vancomycin. Blood cultures were positive for E. coli . You completed course of iv antibiotics. Continue with ciprofloxacin 500 q12 on discharge till 4/25 .  Repeat blood cultures were negative.  Follow up with PCP in 1 week of discharge.

## 2018-04-13 NOTE — DISCHARGE NOTE ADULT - MEDICATION SUMMARY - MEDICATIONS TO TAKE
I will START or STAY ON the medications listed below when I get home from the hospital:    atorvastatin  -- orally once a day (at bedtime)  -- Indication: For HLD (hyperlipidemia)    azelastine nasal  -- Indication: For Nasal dryness     ciprofloxacin 500 mg oral tablet  -- 1 tab(s) by mouth 2 times a day   -- Avoid prolonged or excessive exposure to direct and/or artificial sunlight while taking this medication.  Check with your doctor before becoming pregnant.  Do not take dairy products, antacids, or iron preparations within one hour of this medication.  Finish all this medication unless otherwise directed by prescriber.  Medication should be taken with plenty of water.    -- Indication: For Bacteremia due to Escherichia coli    fluticasone  -- Indication: For Emphysema     Synthroid  -- orally once a day  -- Indication: For Hypothyroidism

## 2018-04-14 LAB
-  AMIKACIN: SIGNIFICANT CHANGE UP
-  AMOXICILLIN/CLAVULANIC ACID: SIGNIFICANT CHANGE UP
-  AMPICILLIN/SULBACTAM: SIGNIFICANT CHANGE UP
-  AMPICILLIN: SIGNIFICANT CHANGE UP
-  AZTREONAM: SIGNIFICANT CHANGE UP
-  CEFAZOLIN: SIGNIFICANT CHANGE UP
-  CEFEPIME: SIGNIFICANT CHANGE UP
-  CEFOXITIN: SIGNIFICANT CHANGE UP
-  CEFTRIAXONE: SIGNIFICANT CHANGE UP
-  CIPROFLOXACIN: SIGNIFICANT CHANGE UP
-  ERTAPENEM: SIGNIFICANT CHANGE UP
-  GENTAMICIN: SIGNIFICANT CHANGE UP
-  IMIPENEM: SIGNIFICANT CHANGE UP
-  LEVOFLOXACIN: SIGNIFICANT CHANGE UP
-  MEROPENEM: SIGNIFICANT CHANGE UP
-  NITROFURANTOIN: SIGNIFICANT CHANGE UP
-  PIPERACILLIN/TAZOBACTAM: SIGNIFICANT CHANGE UP
-  TIGECYCLINE: SIGNIFICANT CHANGE UP
-  TOBRAMYCIN: SIGNIFICANT CHANGE UP
-  TRIMETHOPRIM/SULFAMETHOXAZOLE: SIGNIFICANT CHANGE UP
ANION GAP SERPL CALC-SCNC: 7 MMOL/L — SIGNIFICANT CHANGE UP (ref 5–17)
APPEARANCE UR: CLEAR — SIGNIFICANT CHANGE UP
BASOPHILS # BLD AUTO: 0.1 K/UL — SIGNIFICANT CHANGE UP (ref 0–0.2)
BASOPHILS NFR BLD AUTO: 0.9 % — SIGNIFICANT CHANGE UP (ref 0–2)
BILIRUB UR-MCNC: NEGATIVE — SIGNIFICANT CHANGE UP
BUN SERPL-MCNC: 9 MG/DL — SIGNIFICANT CHANGE UP (ref 7–18)
CALCIUM SERPL-MCNC: 8.1 MG/DL — LOW (ref 8.4–10.5)
CHLORIDE SERPL-SCNC: 109 MMOL/L — HIGH (ref 96–108)
CO2 SERPL-SCNC: 26 MMOL/L — SIGNIFICANT CHANGE UP (ref 22–31)
COLOR SPEC: YELLOW — SIGNIFICANT CHANGE UP
CREAT SERPL-MCNC: 0.81 MG/DL — SIGNIFICANT CHANGE UP (ref 0.5–1.3)
CULTURE RESULTS: SIGNIFICANT CHANGE UP
DIFF PNL FLD: ABNORMAL
EOSINOPHIL # BLD AUTO: 0.1 K/UL — SIGNIFICANT CHANGE UP (ref 0–0.5)
EOSINOPHIL NFR BLD AUTO: 1.8 % — SIGNIFICANT CHANGE UP (ref 0–6)
GLUCOSE SERPL-MCNC: 98 MG/DL — SIGNIFICANT CHANGE UP (ref 70–99)
GLUCOSE UR QL: NEGATIVE — SIGNIFICANT CHANGE UP
HCT VFR BLD CALC: 33.3 % — LOW (ref 34.5–45)
HGB BLD-MCNC: 11.1 G/DL — LOW (ref 11.5–15.5)
KETONES UR-MCNC: NEGATIVE — SIGNIFICANT CHANGE UP
LACTATE SERPL-SCNC: 1.1 MMOL/L — SIGNIFICANT CHANGE UP (ref 0.7–2)
LEUKOCYTE ESTERASE UR-ACNC: ABNORMAL
LYMPHOCYTES # BLD AUTO: 1 K/UL — SIGNIFICANT CHANGE UP (ref 1–3.3)
LYMPHOCYTES # BLD AUTO: 12.4 % — LOW (ref 13–44)
MAGNESIUM SERPL-MCNC: 2.3 MG/DL — SIGNIFICANT CHANGE UP (ref 1.6–2.6)
MCHC RBC-ENTMCNC: 33.1 PG — SIGNIFICANT CHANGE UP (ref 27–34)
MCHC RBC-ENTMCNC: 33.3 GM/DL — SIGNIFICANT CHANGE UP (ref 32–36)
MCV RBC AUTO: 99.4 FL — SIGNIFICANT CHANGE UP (ref 80–100)
METHOD TYPE: SIGNIFICANT CHANGE UP
MONOCYTES # BLD AUTO: 1.3 K/UL — HIGH (ref 0–0.9)
MONOCYTES NFR BLD AUTO: 16.1 % — HIGH (ref 2–14)
NEUTROPHILS # BLD AUTO: 5.4 K/UL — SIGNIFICANT CHANGE UP (ref 1.8–7.4)
NEUTROPHILS NFR BLD AUTO: 68.9 % — SIGNIFICANT CHANGE UP (ref 43–77)
NITRITE UR-MCNC: NEGATIVE — SIGNIFICANT CHANGE UP
ORGANISM # SPEC MICROSCOPIC CNT: SIGNIFICANT CHANGE UP
ORGANISM # SPEC MICROSCOPIC CNT: SIGNIFICANT CHANGE UP
PH UR: 6 — SIGNIFICANT CHANGE UP (ref 5–8)
PHOSPHATE SERPL-MCNC: 2.7 MG/DL — SIGNIFICANT CHANGE UP (ref 2.5–4.5)
PLATELET # BLD AUTO: 193 K/UL — SIGNIFICANT CHANGE UP (ref 150–400)
POTASSIUM SERPL-MCNC: 3.7 MMOL/L — SIGNIFICANT CHANGE UP (ref 3.5–5.3)
POTASSIUM SERPL-SCNC: 3.7 MMOL/L — SIGNIFICANT CHANGE UP (ref 3.5–5.3)
PROT UR-MCNC: NEGATIVE — SIGNIFICANT CHANGE UP
RBC # BLD: 3.35 M/UL — LOW (ref 3.8–5.2)
RBC # FLD: 12 % — SIGNIFICANT CHANGE UP (ref 10.3–14.5)
SODIUM SERPL-SCNC: 142 MMOL/L — SIGNIFICANT CHANGE UP (ref 135–145)
SP GR SPEC: 1.01 — SIGNIFICANT CHANGE UP (ref 1.01–1.02)
SPECIMEN SOURCE: SIGNIFICANT CHANGE UP
UROBILINOGEN FLD QL: NEGATIVE — SIGNIFICANT CHANGE UP
WBC # BLD: 7.8 K/UL — SIGNIFICANT CHANGE UP (ref 3.8–10.5)
WBC # FLD AUTO: 7.8 K/UL — SIGNIFICANT CHANGE UP (ref 3.8–10.5)

## 2018-04-14 RX ORDER — IPRATROPIUM/ALBUTEROL SULFATE 18-103MCG
3 AEROSOL WITH ADAPTER (GRAM) INHALATION EVERY 6 HOURS
Qty: 0 | Refills: 0 | Status: DISCONTINUED | OUTPATIENT
Start: 2018-04-14 | End: 2018-04-16

## 2018-04-14 RX ADMIN — Medication 50 MICROGRAM(S): at 05:09

## 2018-04-14 RX ADMIN — Medication 650 MILLIGRAM(S): at 00:53

## 2018-04-14 RX ADMIN — Medication 50 MILLIGRAM(S): at 05:09

## 2018-04-14 RX ADMIN — Medication 50 MILLIGRAM(S): at 13:13

## 2018-04-14 RX ADMIN — Medication 50 MILLIGRAM(S): at 21:48

## 2018-04-14 RX ADMIN — ENOXAPARIN SODIUM 40 MILLIGRAM(S): 100 INJECTION SUBCUTANEOUS at 13:13

## 2018-04-15 LAB
-  AMIKACIN: SIGNIFICANT CHANGE UP
-  AMPICILLIN/SULBACTAM: SIGNIFICANT CHANGE UP
-  AMPICILLIN: SIGNIFICANT CHANGE UP
-  AZTREONAM: SIGNIFICANT CHANGE UP
-  CEFAZOLIN: SIGNIFICANT CHANGE UP
-  CEFEPIME: SIGNIFICANT CHANGE UP
-  CEFOXITIN: SIGNIFICANT CHANGE UP
-  CEFTRIAXONE: SIGNIFICANT CHANGE UP
-  CIPROFLOXACIN: SIGNIFICANT CHANGE UP
-  ERTAPENEM: SIGNIFICANT CHANGE UP
-  GENTAMICIN: SIGNIFICANT CHANGE UP
-  IMIPENEM: SIGNIFICANT CHANGE UP
-  LEVOFLOXACIN: SIGNIFICANT CHANGE UP
-  MEROPENEM: SIGNIFICANT CHANGE UP
-  PIPERACILLIN/TAZOBACTAM: SIGNIFICANT CHANGE UP
-  TOBRAMYCIN: SIGNIFICANT CHANGE UP
-  TRIMETHOPRIM/SULFAMETHOXAZOLE: SIGNIFICANT CHANGE UP
ANION GAP SERPL CALC-SCNC: 7 MMOL/L — SIGNIFICANT CHANGE UP (ref 5–17)
BASOPHILS # BLD AUTO: 0.1 K/UL — SIGNIFICANT CHANGE UP (ref 0–0.2)
BASOPHILS NFR BLD AUTO: 0.9 % — SIGNIFICANT CHANGE UP (ref 0–2)
BUN SERPL-MCNC: 12 MG/DL — SIGNIFICANT CHANGE UP (ref 7–18)
CALCIUM SERPL-MCNC: 8.5 MG/DL — SIGNIFICANT CHANGE UP (ref 8.4–10.5)
CHLORIDE SERPL-SCNC: 107 MMOL/L — SIGNIFICANT CHANGE UP (ref 96–108)
CO2 SERPL-SCNC: 27 MMOL/L — SIGNIFICANT CHANGE UP (ref 22–31)
CREAT SERPL-MCNC: 0.87 MG/DL — SIGNIFICANT CHANGE UP (ref 0.5–1.3)
CULTURE RESULTS: SIGNIFICANT CHANGE UP
CULTURE RESULTS: SIGNIFICANT CHANGE UP
EOSINOPHIL # BLD AUTO: 0.3 K/UL — SIGNIFICANT CHANGE UP (ref 0–0.5)
EOSINOPHIL NFR BLD AUTO: 5 % — SIGNIFICANT CHANGE UP (ref 0–6)
GLUCOSE SERPL-MCNC: 91 MG/DL — SIGNIFICANT CHANGE UP (ref 70–99)
HCT VFR BLD CALC: 33.2 % — LOW (ref 34.5–45)
HGB BLD-MCNC: 11.3 G/DL — LOW (ref 11.5–15.5)
LYMPHOCYTES # BLD AUTO: 1.3 K/UL — SIGNIFICANT CHANGE UP (ref 1–3.3)
LYMPHOCYTES # BLD AUTO: 21.1 % — SIGNIFICANT CHANGE UP (ref 13–44)
MAGNESIUM SERPL-MCNC: 2.2 MG/DL — SIGNIFICANT CHANGE UP (ref 1.6–2.6)
MCHC RBC-ENTMCNC: 34 GM/DL — SIGNIFICANT CHANGE UP (ref 32–36)
MCHC RBC-ENTMCNC: 34 PG — SIGNIFICANT CHANGE UP (ref 27–34)
MCV RBC AUTO: 100 FL — SIGNIFICANT CHANGE UP (ref 80–100)
METHOD TYPE: SIGNIFICANT CHANGE UP
MONOCYTES # BLD AUTO: 1.1 K/UL — HIGH (ref 0–0.9)
MONOCYTES NFR BLD AUTO: 18.9 % — HIGH (ref 2–14)
NEUTROPHILS # BLD AUTO: 3.2 K/UL — SIGNIFICANT CHANGE UP (ref 1.8–7.4)
NEUTROPHILS NFR BLD AUTO: 54.2 % — SIGNIFICANT CHANGE UP (ref 43–77)
ORGANISM # SPEC MICROSCOPIC CNT: SIGNIFICANT CHANGE UP
PHOSPHATE SERPL-MCNC: 3.2 MG/DL — SIGNIFICANT CHANGE UP (ref 2.5–4.5)
PLATELET # BLD AUTO: 224 K/UL — SIGNIFICANT CHANGE UP (ref 150–400)
POTASSIUM SERPL-MCNC: 3.8 MMOL/L — SIGNIFICANT CHANGE UP (ref 3.5–5.3)
POTASSIUM SERPL-SCNC: 3.8 MMOL/L — SIGNIFICANT CHANGE UP (ref 3.5–5.3)
RBC # BLD: 3.32 M/UL — LOW (ref 3.8–5.2)
RBC # FLD: 12.1 % — SIGNIFICANT CHANGE UP (ref 10.3–14.5)
SODIUM SERPL-SCNC: 141 MMOL/L — SIGNIFICANT CHANGE UP (ref 135–145)
SPECIMEN SOURCE: SIGNIFICANT CHANGE UP
SPECIMEN SOURCE: SIGNIFICANT CHANGE UP
WBC # BLD: 6 K/UL — SIGNIFICANT CHANGE UP (ref 3.8–10.5)
WBC # FLD AUTO: 6 K/UL — SIGNIFICANT CHANGE UP (ref 3.8–10.5)

## 2018-04-15 RX ADMIN — Medication 50 MILLIGRAM(S): at 21:10

## 2018-04-15 RX ADMIN — Medication 50 MICROGRAM(S): at 05:30

## 2018-04-15 RX ADMIN — Medication 50 MILLIGRAM(S): at 05:30

## 2018-04-15 RX ADMIN — Medication 50 MILLIGRAM(S): at 13:48

## 2018-04-15 RX ADMIN — ENOXAPARIN SODIUM 40 MILLIGRAM(S): 100 INJECTION SUBCUTANEOUS at 11:50

## 2018-04-15 NOTE — PROGRESS NOTE ADULT - ATTENDING COMMENTS
Impression  Urosepsis  suspect Pyelonephritis   Hyponatremia resolved    HLD  Emphysema    Hypothyroidism    Continue  IVF ,Levaquin pending culture  report  follow  US renal  nutritional support  GI DVT prophylaxis  ID
Impression  Urosepsis E coli Bacteremia   Pyelonephritis ruled out  culture sensitive to Levaquin   r/o Urinary retention check for  residual urine    Hyponatremia resolved    HLD  Emphysema  stable   Hypothyroidism  stable   Continue  IVF ,Aztreonam pending culture  report  follow  US renal  nutritional support  GI DVT prophylaxis  Discharge  planning in am
Impression  Urosepsis E coli Bacteremit    Pyelonephritis ruled out    r/o Urinary retention check for  residual urine    Hyponatremia resolved    HLD  Emphysema    Hypothyroidism    Continue  IVF ,Aztreonam pending culture  report  follow  US renal  nutritional support  GI DVT prophylaxis

## 2018-04-16 VITALS
SYSTOLIC BLOOD PRESSURE: 146 MMHG | DIASTOLIC BLOOD PRESSURE: 68 MMHG | OXYGEN SATURATION: 97 % | HEART RATE: 74 BPM | TEMPERATURE: 98 F | RESPIRATION RATE: 18 BRPM

## 2018-04-16 LAB
ANION GAP SERPL CALC-SCNC: 6 MMOL/L — SIGNIFICANT CHANGE UP (ref 5–17)
BASOPHILS # BLD AUTO: 0.1 K/UL — SIGNIFICANT CHANGE UP (ref 0–0.2)
BASOPHILS NFR BLD AUTO: 1.2 % — SIGNIFICANT CHANGE UP (ref 0–2)
BUN SERPL-MCNC: 11 MG/DL — SIGNIFICANT CHANGE UP (ref 7–18)
CALCIUM SERPL-MCNC: 8.6 MG/DL — SIGNIFICANT CHANGE UP (ref 8.4–10.5)
CHLORIDE SERPL-SCNC: 107 MMOL/L — SIGNIFICANT CHANGE UP (ref 96–108)
CO2 SERPL-SCNC: 30 MMOL/L — SIGNIFICANT CHANGE UP (ref 22–31)
CREAT SERPL-MCNC: 0.8 MG/DL — SIGNIFICANT CHANGE UP (ref 0.5–1.3)
EOSINOPHIL # BLD AUTO: 0.4 K/UL — SIGNIFICANT CHANGE UP (ref 0–0.5)
EOSINOPHIL NFR BLD AUTO: 7.1 % — HIGH (ref 0–6)
GLUCOSE SERPL-MCNC: 87 MG/DL — SIGNIFICANT CHANGE UP (ref 70–99)
HCT VFR BLD CALC: 34.5 % — SIGNIFICANT CHANGE UP (ref 34.5–45)
HGB BLD-MCNC: 11.4 G/DL — LOW (ref 11.5–15.5)
LYMPHOCYTES # BLD AUTO: 1.5 K/UL — SIGNIFICANT CHANGE UP (ref 1–3.3)
LYMPHOCYTES # BLD AUTO: 25.6 % — SIGNIFICANT CHANGE UP (ref 13–44)
MAGNESIUM SERPL-MCNC: 2.3 MG/DL — SIGNIFICANT CHANGE UP (ref 1.6–2.6)
MCHC RBC-ENTMCNC: 33.1 GM/DL — SIGNIFICANT CHANGE UP (ref 32–36)
MCHC RBC-ENTMCNC: 33.2 PG — SIGNIFICANT CHANGE UP (ref 27–34)
MCV RBC AUTO: 100.1 FL — HIGH (ref 80–100)
MONOCYTES # BLD AUTO: 1 K/UL — HIGH (ref 0–0.9)
MONOCYTES NFR BLD AUTO: 16.4 % — HIGH (ref 2–14)
NEUTROPHILS # BLD AUTO: 3 K/UL — SIGNIFICANT CHANGE UP (ref 1.8–7.4)
NEUTROPHILS NFR BLD AUTO: 49.7 % — SIGNIFICANT CHANGE UP (ref 43–77)
PHOSPHATE SERPL-MCNC: 3.5 MG/DL — SIGNIFICANT CHANGE UP (ref 2.5–4.5)
PLATELET # BLD AUTO: 259 K/UL — SIGNIFICANT CHANGE UP (ref 150–400)
POTASSIUM SERPL-MCNC: 3.6 MMOL/L — SIGNIFICANT CHANGE UP (ref 3.5–5.3)
POTASSIUM SERPL-SCNC: 3.6 MMOL/L — SIGNIFICANT CHANGE UP (ref 3.5–5.3)
RBC # BLD: 3.45 M/UL — LOW (ref 3.8–5.2)
RBC # FLD: 12 % — SIGNIFICANT CHANGE UP (ref 10.3–14.5)
SODIUM SERPL-SCNC: 143 MMOL/L — SIGNIFICANT CHANGE UP (ref 135–145)
WBC # BLD: 6 K/UL — SIGNIFICANT CHANGE UP (ref 3.8–10.5)
WBC # FLD AUTO: 6 K/UL — SIGNIFICANT CHANGE UP (ref 3.8–10.5)

## 2018-04-16 PROCEDURE — 87086 URINE CULTURE/COLONY COUNT: CPT

## 2018-04-16 PROCEDURE — 87186 SC STD MICRODIL/AGAR DIL: CPT

## 2018-04-16 PROCEDURE — 83735 ASSAY OF MAGNESIUM: CPT

## 2018-04-16 PROCEDURE — 80053 COMPREHEN METABOLIC PANEL: CPT

## 2018-04-16 PROCEDURE — 82746 ASSAY OF FOLIC ACID SERUM: CPT

## 2018-04-16 PROCEDURE — 83036 HEMOGLOBIN GLYCOSYLATED A1C: CPT

## 2018-04-16 PROCEDURE — 76775 US EXAM ABDO BACK WALL LIM: CPT

## 2018-04-16 PROCEDURE — 87798 DETECT AGENT NOS DNA AMP: CPT

## 2018-04-16 PROCEDURE — 84443 ASSAY THYROID STIM HORMONE: CPT

## 2018-04-16 PROCEDURE — 84100 ASSAY OF PHOSPHORUS: CPT

## 2018-04-16 PROCEDURE — 87581 M.PNEUMON DNA AMP PROBE: CPT

## 2018-04-16 PROCEDURE — 93306 TTE W/DOPPLER COMPLETE: CPT

## 2018-04-16 PROCEDURE — 87486 CHLMYD PNEUM DNA AMP PROBE: CPT

## 2018-04-16 PROCEDURE — 80048 BASIC METABOLIC PNL TOTAL CA: CPT

## 2018-04-16 PROCEDURE — 85027 COMPLETE CBC AUTOMATED: CPT

## 2018-04-16 PROCEDURE — 87150 DNA/RNA AMPLIFIED PROBE: CPT

## 2018-04-16 PROCEDURE — 82607 VITAMIN B-12: CPT

## 2018-04-16 PROCEDURE — 99285 EMERGENCY DEPT VISIT HI MDM: CPT | Mod: 25

## 2018-04-16 PROCEDURE — 83605 ASSAY OF LACTIC ACID: CPT

## 2018-04-16 PROCEDURE — 87040 BLOOD CULTURE FOR BACTERIA: CPT

## 2018-04-16 PROCEDURE — 81001 URINALYSIS AUTO W/SCOPE: CPT

## 2018-04-16 PROCEDURE — 80061 LIPID PANEL: CPT

## 2018-04-16 PROCEDURE — 96374 THER/PROPH/DIAG INJ IV PUSH: CPT

## 2018-04-16 PROCEDURE — 87633 RESP VIRUS 12-25 TARGETS: CPT

## 2018-04-16 PROCEDURE — 71046 X-RAY EXAM CHEST 2 VIEWS: CPT

## 2018-04-16 PROCEDURE — 84436 ASSAY OF TOTAL THYROXINE: CPT

## 2018-04-16 RX ORDER — CIPROFLOXACIN LACTATE 400MG/40ML
1 VIAL (ML) INTRAVENOUS
Qty: 20 | Refills: 0 | OUTPATIENT
Start: 2018-04-16 | End: 2018-04-25

## 2018-04-16 RX ADMIN — Medication 50 MILLIGRAM(S): at 05:51

## 2018-04-16 RX ADMIN — Medication 50 MICROGRAM(S): at 05:51

## 2018-04-16 NOTE — PROGRESS NOTE ADULT - PROBLEM SELECTOR PLAN 1
-Meets SIRS criteria with known UTI. s/p 1g Rocephin IV in ED.  -Patient spiking fevers on ultrasound : Will broaden coverage , with Aztreonam and vancomycin , consult Dr. Roman   -f/u urine and blood culture result  -s/p 3L IV bolus in ED, c/w IV maintenance  -Check renal sono for mild Lt CVAT(+)  -Tylenol PRN for fever
-Meets SIRS criteria with known UTI. s/p 1g Rocephin IV in ED.  -Patient spiking fevers on ultrasound : Will broaden coverage , with Aztreonam and , consult Dr. Roman   -f/u urine and blood culture result : blood cultures + for E. coli , pending sensitivity , urine cx pansensitive E.coli   -s/p 3L IV bolus in ED, c/w IV maintenance  -Check renal sono for mild Lt CVAT(+)  -Tylenol PRN for fever
-Meets SIRS criteria with known UTI. s/p 1g Rocephin IV in ED.  -Patient spiking fevers on ultrasound : Will broaden coverage , with Aztreonam and , consult Dr. Roman   -f/u urine and blood culture result : blood cultures + for E. coli , pending sensitivity , urine cx pansensitive E.coli   -s/p 3L IV bolus in ED, c/w IV maintenance  -Check renal sono for mild Lt CVAT(+)  -Tylenol PRN for fever
-Meets SIRS criteria with known UTI. s/p 1g Rocephin IV in ED.  -Patient spiking fevers on ultrasound : Will broaden coverage , with Aztreonam and vancomycin , consult Dr. Roman   -f/u urine and blood culture result  -s/p 3L IV bolus in ED, c/w IV maintenance  -Check renal sono for mild Lt CVAT(+)  -Tylenol PRN for fever

## 2018-04-16 NOTE — PROGRESS NOTE ADULT - SUBJECTIVE AND OBJECTIVE BOX
PGY 1 Note discussed with supervising resident and primary attending    Patient is a 77y old  Female who presents with a chief complaint of Chills and rigors, urinary burning sense (2018 22:32)      INTERVAL HPI/OVERNIGHT EVENTS: high grade fever overnight , multiple episodes     MEDICATIONS  (STANDING):  ALBUTerol/ipratropium for Nebulization 3 milliLiter(s) Nebulizer every 6 hours  atorvastatin 10 milliGRAM(s) Oral at bedtime  aztreonam  IVPB 1000 milliGRAM(s) IV Intermittent once  aztreonam  IVPB 1000 milliGRAM(s) IV Intermittent every 8 hours  enoxaparin Injectable 40 milliGRAM(s) SubCutaneous daily  levothyroxine 50 MICROGram(s) Oral daily  sodium chloride 0.9%. 1000 milliLiter(s) (100 mL/Hr) IV Continuous <Continuous>  vancomycin  IVPB 1000 milliGRAM(s) IV Intermittent once    MEDICATIONS  (PRN):  acetaminophen   Tablet 650 milliGRAM(s) Oral every 6 hours PRN For Temp greater than 38 C (100.4 F)      __________________________________________________  REVIEW OF SYSTEMS: complaints of nausea     CONSTITUTIONAL: No fever,   EYES: no acute visual disturbances  NECK: No pain or stiffness  RESPIRATORY: No cough; No shortness of breath  CARDIOVASCULAR: No chest pain, no palpitations  GASTROINTESTINAL: No pain. No nausea or vomiting; No diarrhea   NEUROLOGICAL: No headache or numbness, no tremors  MUSCULOSKELETAL: No joint pain, no muscle pain  GENITOURINARY: no dysuria, no frequency, no hesitancy  PSYCHIATRY: no depression , no anxiety  ALL OTHER  ROS negative        Vital Signs Last 24 Hrs  T(C): 38 (2018 07:47), Max: 38.9 (2018 18:57)  T(F): 100.4 (2018 07:47), Max: 102 (2018 18:57)  HR: 66 (2018 07:47) (66 - 104)  BP: 103/55 (2018 07:47) (100/55 - 121/51)  BP(mean): --  RR: 16 (2018 07:47) (16 - 18)  SpO2: 97% (2018 07:47) (95% - 99%)    ________________________________________________  PHYSICAL EXAM:  GENERAL: NAD  HEENT: Normocephalic;  conjunctivae and sclerae clear; moist mucous membranes;   NECK : supple  CHEST/LUNG: Clear to auscultation bilaterally with good air entry ; mild Left CVA tenderness+  HEART: S1 S2  regular; no murmurs, gallops or rubs  ABDOMEN: Soft, Nontender, Nondistended; Bowel sounds present  EXTREMITIES: no cyanosis; no edema; no calf tenderness  SKIN: warm and dry; no rash  NERVOUS SYSTEM:  Awake and alert; Oriented  to place, person and time ; no new deficits    _________________________________________________  LABS:                        10.8   13.4  )-----------( 162      ( 2018 06:37 )             31.9     04-13    137  |  106  |  13  ----------------------------<  110<H>  3.6   |  24  |  0.81    Ca    7.8<L>      2018 06:37  Phos  2.1     04-13  Mg     2.2     -13    TPro  7.2  /  Alb  3.6  /  TBili  0.9  /  DBili  x   /  AST  24  /  ALT  33  /  AlkPhos  69  04-12      Urinalysis Basic - ( 2018 18:56 )    Color: Yellow / Appearance: Slightly Turbid / S.005 / pH: x  Gluc: x / Ketone: Small  / Bili: Negative / Urobili: Negative   Blood: x / Protein: 30 mg/dL / Nitrite: Positive   Leuk Esterase: Moderate / RBC: 0-2 /HPF / WBC >50 /HPF   Sq Epi: x / Non Sq Epi: Few /HPF / Bacteria: Moderate /HPF      CAPILLARY BLOOD GLUCOSE            Consultant(s) Notes Reviewed:   YES    Care Discussed with Consultants : YES    Plan of care was discussed with patient and /or primary care giver; all questions and concerns were addressed and care was aligned with patient's wishes.
Meds:  aztreonam  IVPB 1000 milliGRAM(s) IV Intermittent every 8 hours    Allergies:  Allergies    amoxicillin (Rash)    Intolerances    ROS  [  ] UNABLE TO ELICIT    General:  [  ] None  [ x ] Fever  [ x ] Chills  [  ] Malaise    Skin:  [ x ] None [  ] Rash  [  ] Wound  [  ] Ulcer    HEENT:  [ x ] None  [  ] Sore Throat  [  ] Nasal congestion/ runny nose  [  ] Photophobia [  ] Neck pain      Chest:  [ x ] None   [  ] SOB  [  ] Cough  [  ] None    Cardiovascular:   [ x ] None  [  ] CP  [  ] Palpitation    Gastrointestinal:  [ x ] None  [  ] Abd pain   [  ] Nausea    [  ] Vomiting   [  ] Diarrhea	     Genitourinary:  [  ] None [  ] Polyuria   [  ] Urgency  [  ] Frequency  [ x ] Dysuria    [  ]  Hematuria       Musculoskeletal:  [  ] None [  ] Back Pain	[  ] Body aches  [  ] Joint pain [ x ] Weakness    Neurological: [  ] None [  ]Dizziness  [  ]Visual Disturbance  [  ]Headaches   [ x ] Weakness            PHYSICAL EXAM:    Vital Signs Last 24 Hrs  T(C): 37 (14 Apr 2018 16:05), Max: 38.4 (14 Apr 2018 00:10)  T(F): 98.6 (14 Apr 2018 16:05), Max: 101.2 (14 Apr 2018 00:10)  HR: 73 (14 Apr 2018 16:05) (57 - 73)  BP: 110/51 (14 Apr 2018 16:05) (109/48 - 111/57)  BP(mean): --  RR: 16 (14 Apr 2018 16:05) (16 - 18)  SpO2: 98% (14 Apr 2018 16:05) (98% - 99%)    Constitutional:    HEENT: [ x ] Wnl  [  ] Pharyngeal congestion    Neck:  [ x ] Supple  [  ]Lymphadenopathy  [  ] No JVD   [  ] JVD  [  ] Masses   [  ] WNL    CHEST/Respiratory:  [ x ]Clear to auscultation  [  ] Rales   [  ] Rhonchi   [  ] Wheezing     [  ] Chest Tenderness      Cardiovascular:  [ x ] Reg S1 S2   [  ] Irreg S1 S2   [ x ]No Murmur  [  ] +ve Murmurs  [  ]Systolic [  ]Diastolic      Abdomen:  [ x ] Soft  [ x ] No tendrerness  [  ] Tenderness  [  ] Organomegaly  [  ] ABD Distention  [  ] Rigidity                       [ x ] No Regidity                       [ x ] No Rebound Tenderness  [  ] No Guarding Rigidity  [  ] Rebound Tenderness[  ] Guarding Rigidity                          [ x ]  +ve Bowel Sounds  [  ] Decreased Bowel Sounds    [  ] Absent Bowel Sounds                            Extremities: [ x ] No edema [  ] Edema  [  ] Clubbing   [  ] Cyanosis                         [ x ] No Tender Calf muscles  [  ] Tender Calf muscles                        [ x ] Palpable peripheral pulses    Neurological: [ x ] Awake  [ x ] Alert  [ x ] Oriented  x  3                           [  ] Confused  [  ] Drowsy  [  ] respond to painful stimuli  [  ] Unresponsive    Skin:  [ x ] Intact [  ] Redness [  ] Thrombophlebitis  [  ] Rashes  [  ] Dry  [  ] Ulcers    Ortho:  [  ] Joint Swelling  [  ] Joint erythema [  ] Joint tenderness                [  ] Increased temp. to touch  [  ] DJD [ x ] WNL          LABS/DIAGNOSTIC TESTS                          11.1   7.8   )-----------( 193      ( 14 Apr 2018 06:17 )             33.3     Lactate, Blood: 1.1 mmol/L (04-14 @ 12:36)      04-14    142  |  109<H>  |  9   ----------------------------<  98  3.7   |  26  |  0.81    Ca    8.1<L>      14 Apr 2018 06:15  Phos  2.7     04-14  Mg     2.3     04-14    TPro  7.2  /  Alb  3.6  /  TBili  0.9  /  DBili  x   /  AST  24  /  ALT  33  /  AlkPhos  69  04-12      LIVER FUNCTIONS - ( 12 Apr 2018 19:09 )  Alb: 3.6 g/dL / Pro: 7.2 g/dL / ALK PHOS: 69 U/L / ALT: 33 U/L DA / AST: 24 U/L / GGT: x               CULTURES:   Culture - Blood (04.12.18 @ 23:34)    Gram Stain:   Growth in aerobic bottle: Gram Negative Rods  Growth in anaerobic bottle: Gram Negative Rods    -  Escherichia coli: Detec    Specimen Source: .Blood Blood-Peripheral    Organism: Blood Culture PCR    Culture Results:   Growth in aerobic and anaerobic bottles: Escherichia coli  "Due to technical problems, Proteus sp. will Not be reported as part of  the BCID panel until further notice"  ***Blood Panel PCR results on this specimen are available  approximately 3 hours after the Gram stain result.***  Gram stain, PCR, and/or culture results may not always  correspond due to difference in methodologies.  ************************************************************  This PCR assay was performed using [employer].  The following targets are tested for: Enterococcus,  vancomycin resistant enterococci, Listeria monocytogenes,  coagulase negative staphylococci, S. aureus,  methicillin resistant S. aureus, Streptococcus agalactiae  (Group B), S. pneumoniae, S.pyogenes (Group A),  Acinetobacter baumannii, Enterobacter cloacae, E. coli,  Klebsiella oxytoca, K. pneumoniae, Proteus sp.,  Serratia marcescens, Haemophilus influenzae,  Neisseria meningitidis, Pseudomonas aeruginosa, Candida  albicans, C. glabrata, C krusei, C parapsilosis,  C. tropicalis and the KPC resistance gene.    Organism Identification: Blood Culture PCR    Method Type: PCR      Culture - Blood (04.12.18 @ 23:34)    Gram Stain:   Growth in aerobic bottle: Gram Negative Rods    Specimen Source: .Blood Blood-Peripheral    Culture Results:   Growth in aerobic bottle: Escherichia coli  See previous culture [id number]      Culture - Urine (04.12.18 @ 23:11)    Specimen Source: .Urine Clean Catch (Midstream)    Culture Results:   >100,000 CFU/ml Escherichia coli    RADIOLOGY    CXR:    EXAM:  XR CHEST PA LAT 2V                            PROCEDURE DATE:  04/12/2018          INTERPRETATION:  PA AND LATERAL CHEST X-RAYS    HISTORY: fever.    COMPARISON: None.    FINDINGS:  Lungs are hyperinflated.  No focal lung consolidation. Biapical pleural parenchymal scarring.  No pneumothorax or pleural effusion.   The cardiomediastinal silhouette is within normal limits in size.  Moderate levoscoliosis of the thoracolumbar spine with apex at T12-L1.    IMPRESSION:  No focal lung consolidation. Hyperinflation/emphysema.          EXAM:  US KIDNEY(S)                            PROCEDURE DATE:  04/13/2018          INTERPRETATION:  EXAM: US KIDNEY(S)   INDICATION: left CVA tenderness. Sepsis.  COMPARISON: None.    TECHNIQUE: Grayscale ultrasound of the kidneys was performed.    FINDINGS:    Right kidney: Normal size, measures 12.8 x 5.1 x 5.8 cm. No   hydronephrosis, shadowing calculus, or perinephric fluid collection. Tiny   7 mm round anechoic structure in the lower pole may represent a cyst    Left kidney: Normal size, measures 12.3 x 4.3 x 4.9 cm. No   hydronephrosis, shadowing calculus, or perinephric fluid collection.    Moderately distended urinary bladder is grossly unremarkable.    Visualized proximal abdominal aorta and IVC are grossly unremarkable.    IMPRESSION:   Possible tiny subcentimeter right renal cyst.    No hydronephrosis.          Assessment and Recommendation:   77 years old female from home, walks in dependently, lives with , with PMH of hypothyroidism, hyperlipidemia, and h/o bronchial pneumonia presented to ED c/o chills and rigors for 2 days, and 1 week of stinging urinary sense.  Patient was started on Levaquin, that was subsequently changed to Azactam.  Blood culture grew E coli.    Problem/Recommendation - 1:  Problem: Bacteremia due to Escherichia coli.   Recommendation:   1- Follow Repeat blood cultures.  2- Continue Azactam 1 gm IVPB q 8 hours.  3- Follow +ve blood cultures till final report.    Problem/Recommendation - 2:  ·  Problem: UTI (urinary tract infection).    Recommendation:   1- Follow up +ve urine CS.  2- Blood culture as above.  3- Renal and bladder sonogram result is noted.  4- Azactam1 gm IVPB q 8 hours.  5- IV and PO hydration.  6- CBC and BMP follow up.     Problem/Recommendation - 3:  ·  Problem: Hypothyroid.    Recommendation:   1- TSH level monitoring.  2- Synthroid.     Problem/Recommendation - 4:  ·  Problem: HLD (hyperlipidemia).    Recommendation:   1- low fat low cholesterol diet.  2- Lipid profile.  3- Cholesterol medication as per primary care team.     Discussed with medical resident.  Discussed with Patient and patient's  and answered all tier questions in details.
Meds:  aztreonam  IVPB 1000 milliGRAM(s) IV Intermittent every 8 hours    Allergies:  Allergies    amoxicillin (Rash)    Intolerances    ROS  [  ] UNABLE TO ELICIT    General:  [  ] None  [ x ] Fever  [ x ] Chills  [  ] Malaise    Skin:  [ x ] None [  ] Rash  [  ] Wound  [  ] Ulcer    HEENT:  [ x ] None  [  ] Sore Throat  [  ] Nasal congestion/ runny nose  [  ] Photophobia [  ] Neck pain      Chest:  [ x ] None   [  ] SOB  [  ] Cough  [  ] None    Cardiovascular:   [ x ] None  [  ] CP  [  ] Palpitation    Gastrointestinal:  [ x ] None  [  ] Abd pain   [  ] Nausea    [  ] Vomiting   [  ] Diarrhea	     Genitourinary:  [  ] None [  ] Polyuria   [  ] Urgency  [  ] Frequency  [ x ] Dysuria    [  ]  Hematuria       Musculoskeletal:  [  ] None [  ] Back Pain	[  ] Body aches  [  ] Joint pain [ x ] Weakness    Neurological: [  ] None [  ]Dizziness  [  ]Visual Disturbance  [  ]Headaches   [ x ] Weakness            PHYSICAL EXAM:  Vital Signs Last 24 Hrs  T(C): 36.5 (15 Apr 2018 07:30), Max: 37 (14 Apr 2018 16:05)  T(F): 97.7 (15 Apr 2018 07:30), Max: 98.6 (14 Apr 2018 16:05)  HR: 57 (15 Apr 2018 07:30) (57 - 73)  BP: 122/60 (15 Apr 2018 07:30) (110/51 - 122/60)  BP(mean): --  RR: 16 (15 Apr 2018 07:30) (16 - 16)  SpO2: 98% (15 Apr 2018 07:30) (97% - 98%)    Constitutional:    HEENT: [ x ] Wnl  [  ] Pharyngeal congestion    Neck:  [ x ] Supple  [  ]Lymphadenopathy  [  ] No JVD   [  ] JVD  [  ] Masses   [  ] WNL    CHEST/Respiratory:  [ x ]Clear to auscultation  [  ] Rales   [  ] Rhonchi   [  ] Wheezing     [  ] Chest Tenderness      Cardiovascular:  [ x ] Reg S1 S2   [  ] Irreg S1 S2   [ x ]No Murmur  [  ] +ve Murmurs  [  ]Systolic [  ]Diastolic      Abdomen:  [ x ] Soft  [ x ] No tendrerness  [  ] Tenderness  [  ] Organomegaly  [  ] ABD Distention  [  ] Rigidity                       [ x ] No Regidity                       [ x ] No Rebound Tenderness  [  ] No Guarding Rigidity  [  ] Rebound Tenderness[  ] Guarding Rigidity                          [ x ]  +ve Bowel Sounds  [  ] Decreased Bowel Sounds    [  ] Absent Bowel Sounds                            Extremities: [ x ] No edema [  ] Edema  [  ] Clubbing   [  ] Cyanosis                         [ x ] No Tender Calf muscles  [  ] Tender Calf muscles                        [ x ] Palpable peripheral pulses    Neurological: [ x ] Awake  [ x ] Alert  [ x ] Oriented  x  3                           [  ] Confused  [  ] Drowsy  [  ] respond to painful stimuli  [  ] Unresponsive    Skin:  [ x ] Intact [  ] Redness [  ] Thrombophlebitis  [  ] Rashes  [  ] Dry  [  ] Ulcers    Ortho:  [  ] Joint Swelling  [  ] Joint erythema [  ] Joint tenderness                [  ] Increased temp. to touch  [  ] DJD [ x ] [facility]          LABS/DIAGNOSTIC TESTS                        11.3   6.0   )-----------( 224      ( 15 Apr 2018 06:14 )             33.2   04-15    141  |  107  |  12  ----------------------------<  91  3.8   |  27  |  0.87    Ca    8.5      15 Apr 2018 06:14  Phos  3.2     04-15  Mg     2.2     04-15          CULTURES:   Culture - Blood (04.12.18 @ 23:34)    Gram Stain:   Growth in aerobic bottle: Gram Negative Rods  Growth in anaerobic bottle: Gram Negative Rods    -  Escherichia coli: Detec    Specimen Source: .Blood Blood-Peripheral    Organism: Blood Culture PCR    Culture Results:   Growth in aerobic and anaerobic bottles: Escherichia coli  "Due to technical problems, Proteus sp. will Not be reported as part of  the BCID panel until further notice"  ***Blood Panel PCR results on this specimen are available  approximately 3 hours after the Gram stain result.***  Gram stain, PCR, and/or culture results may not always  correspond due to difference in methodologies.  ************************************************************  This PCR assay was performed using [employer].  The following targets are tested for: Enterococcus,  vancomycin resistant enterococci, Listeria monocytogenes,  coagulase negative staphylococci, S. aureus,  methicillin resistant S. aureus, Streptococcus agalactiae  (Group B), S. pneumoniae, S.pyogenes (Group A),  Acinetobacter baumannii, Enterobacter cloacae, E. coli,  Klebsiella oxytoca, K. pneumoniae, Proteus sp.,  Serratia marcescens, Haemophilus influenzae,  Neisseria meningitidis, Pseudomonas aeruginosa, Candida  albicans, C. glabrata, C krusei, C parapsilosis,  C. tropicalis and the KPC resistance gene.    Organism Identification: Blood Culture PCR    Method Type: PCR      Culture - Blood (04.12.18 @ 23:34)    Gram Stain:   Growth in aerobic bottle: Gram Negative Rods    Specimen Source: .Blood Blood-Peripheral    Culture Results:   Growth in aerobic bottle: Escherichia coli  See previous culture [id number]      Culture - Urine (04.12.18 @ 23:11)    -  Amikacin: S <=8    -  Amoxicillin/Clavulanic Acid: S <=8/4    -  Ampicillin: S 4 These ampicillin results predict results for amoxicillin    -  Ampicillin/Sulbactam: S <=4/2    -  Aztreonam: S <=4    -  Cefazolin: S <=2 This predicts results for oral agents cefaclor, cefdinir, cefpodoxime, cefprozil, cefuroxime axetil, cephalexin and locarbef for uncomplicated UTI. Note that some isolates may be susceptible to these agents while testing resistant to cefazolin.    -  Cefepime: S <=2    -  Cefoxitin: S 8    -  Ceftriaxone: S <=1 Enterobacter, Citrobacter, and Serratia may develop resistance during prolonged therapy    -  Ciprofloxacin: S <=0.5    -  Ertapenem: S <=0.5    -  Gentamicin: S 2    -  Imipenem: S <=1    -  Levofloxacin: S <=1    -  Meropenem: S <=1    -  Nitrofurantoin: S <=32 Should not be used to treat pyelonephritis    -  Piperacillin/Tazobactam: S <=8    -  Tigecycline: S <=1    -  Tobramycin: S <=2    -  Trimethoprim/Sulfamethoxazole: S <=0.5/9.5    Specimen Source: .Urine Clean Catch (Midstream)    Culture Results:   >100,000 CFU/ml Escherichia coli    Organism Identification: Escherichia coli    Organism: Escherichia coli    Method Type: SNEHA      RADIOLOGY    CXR:    EXAM:  XR CHEST PA LAT 2V                            PROCEDURE DATE:  04/12/2018          INTERPRETATION:  PA AND LATERAL CHEST X-RAYS    HISTORY: fever.    COMPARISON: None.    FINDINGS:  Lungs are hyperinflated.  No focal lung consolidation. Biapical pleural parenchymal scarring.  No pneumothorax or pleural effusion.   The cardiomediastinal silhouette is within normal limits in size.  Moderate levoscoliosis of the thoracolumbar spine with apex at T12-L1.    IMPRESSION:  No focal lung consolidation. Hyperinflation/emphysema.          EXAM:  US KIDNEY(S)                            PROCEDURE DATE:  04/13/2018          INTERPRETATION:  EXAM: US KIDNEY(S)   INDICATION: left CVA tenderness. Sepsis.  COMPARISON: None.    TECHNIQUE: Grayscale ultrasound of the kidneys was performed.    FINDINGS:    Right kidney: Normal size, measures 12.8 x 5.1 x 5.8 cm. No   hydronephrosis, shadowing calculus, or perinephric fluid collection. Tiny   7 mm round anechoic structure in the lower pole may represent a cyst    Left kidney: Normal size, measures 12.3 x 4.3 x 4.9 cm. No   hydronephrosis, shadowing calculus, or perinephric fluid collection.    Moderately distended urinary bladder is grossly unremarkable.    Visualized proximal abdominal aorta and IVC are grossly unremarkable.    IMPRESSION:   Possible tiny subcentimeter right renal cyst.    No hydronephrosis.          Assessment and Recommendation:   77 years old female from home, walks in dependently, lives with , with PMH of hypothyroidism, hyperlipidemia, and h/o bronchial pneumonia presented to ED c/o chills and rigors for 2 days, and 1 week of stinging urinary sense.  Patient was started on Levaquin, that was subsequently changed to Azactam.  Blood, and urine cultures grew E coli.    Problem/Recommendation - 1:  Problem: Bacteremia due to Escherichia coli.   Recommendation:   1- Follow Repeat blood cultures.  2- Continue Azactam 1 gm IVPB q 8 hours.  3- Follow +ve blood cultures till final report.    Problem/Recommendation - 2:  ·  Problem: UTI (urinary tract infection).    Recommendation:   1- Follow up +ve urine CS.  2- Blood culture as above.  3- Renal and bladder sonogram result is noted.  4- Azactam1 gm IVPB q 8 hours.  5- IV and PO hydration.  6- CBC and BMP follow up.     Problem/Recommendation - 3:  ·  Problem: Hypothyroid.    Recommendation:   1- TSH level monitoring.  2- Synthroid.     Problem/Recommendation - 4:  ·  Problem: HLD (hyperlipidemia).    Recommendation:   1- low fat low cholesterol diet.  2- Lipid profile.  3- Cholesterol medication as per primary care team.     Discussed with medical resident.  Discussed with Patient and patient's  and answered all thier questions in details.
Meds:  aztreonam  IVPB 1000 milliGRAM(s) IV Intermittent every 8 hours    Allergies:  Allergies    amoxicillin (Rash)    Intolerances    ROS  [  ] UNABLE TO ELICIT    General:  [  ] None  [ x ] Fever  [ x ] Chills  [  ] Malaise    Skin:  [ x ] None [  ] Rash  [  ] Wound  [  ] Ulcer    HEENT:  [ x ] None  [  ] Sore Throat  [  ] Nasal congestion/ runny nose  [  ] Photophobia [  ] Neck pain      Chest:  [ x ] None   [  ] SOB  [  ] Cough  [  ] None    Cardiovascular:   [ x ] None  [  ] CP  [  ] Palpitation    Gastrointestinal:  [ x ] None  [  ] Abd pain   [  ] Nausea    [  ] Vomiting   [  ] Diarrhea	     Genitourinary:  [  ] None [  ] Polyuria   [  ] Urgency  [  ] Frequency  [ x ] Dysuria    [  ]  Hematuria       Musculoskeletal:  [  ] None [  ] Back Pain	[  ] Body aches  [  ] Joint pain [ x ] Weakness    Neurological: [  ] None [  ]Dizziness  [  ]Visual Disturbance  [  ]Headaches   [ x ] Weakness            PHYSICAL EXAM:  Vital Signs Last 24 Hrs  T(C): 37.3 (15 Apr 2018 23:54), Max: 37.3 (15 Apr 2018 23:54)  T(F): 99.1 (15 Apr 2018 23:54), Max: 99.1 (15 Apr 2018 23:54)  HR: 60 (15 Apr 2018 23:54) (57 - 72)  BP: 106/54 (15 Apr 2018 23:54) (106/54 - 124/65)  BP(mean): --  RR: 16 (15 Apr 2018 23:54) (16 - 16)  SpO2: 98% (15 Apr 2018 23:54) (98% - 100%)    Constitutional:    HEENT: [ x ] Wnl  [  ] Pharyngeal congestion    Neck:  [ x ] Supple  [  ]Lymphadenopathy  [  ] No JVD   [  ] JVD  [  ] Masses   [  ] WNL    CHEST/Respiratory:  [ x ]Clear to auscultation  [  ] Rales   [  ] Rhonchi   [  ] Wheezing     [  ] Chest Tenderness      Cardiovascular:  [ x ] Reg S1 S2   [  ] Irreg S1 S2   [ x ]No Murmur  [  ] +ve Murmurs  [  ]Systolic [  ]Diastolic      Abdomen:  [ x ] Soft  [ x ] No tendrerness  [  ] Tenderness  [  ] Organomegaly  [  ] ABD Distention  [  ] Rigidity                       [ x ] No Regidity                       [ x ] No Rebound Tenderness  [  ] No Guarding Rigidity  [  ] Rebound Tenderness[  ] Guarding Rigidity                          [ x ]  +ve Bowel Sounds  [  ] Decreased Bowel Sounds    [  ] Absent Bowel Sounds                            Extremities: [ x ] No edema [  ] Edema  [  ] Clubbing   [  ] Cyanosis                         [ x ] No Tender Calf muscles  [  ] Tender Calf muscles                        [ x ] Palpable peripheral pulses    Neurological: [ x ] Awake  [ x ] Alert  [ x ] Oriented  x  3                           [  ] Confused  [  ] Drowsy  [  ] respond to painful stimuli  [  ] Unresponsive    Skin:  [ x ] Intact [  ] Redness [  ] Thrombophlebitis  [  ] Rashes  [  ] Dry  [  ] Ulcers    Ortho:  [  ] Joint Swelling  [  ] Joint erythema [  ] Joint tenderness                [  ] Increased temp. to touch  [  ] DJD [ x ] WNL          LABS/DIAGNOSTIC TESTS                        11.3   6.0   )-----------( 224      ( 15 Apr 2018 06:14 )             33.2   04-15    141  |  107  |  12  ----------------------------<  91  3.8   |  27  |  0.87    Ca    8.5      15 Apr 2018 06:14  Phos  3.2     04-15  Mg     2.2     04-15          CULTURES:   Culture - Blood in AM (04.14.18 @ 09:33)    Specimen Source: .Blood Blood    Culture Results:   No growth to date.    Culture - Blood in AM (04.14.18 @ 09:33)    Specimen Source: .Blood Blood    Culture Results:   No growth to date.    Culture - Blood (04.12.18 @ 23:34)    -  Ampicillin: S 4 These ampicillin results predict results for amoxicillin    Gram Stain:   Growth in aerobic bottle: Gram Negative Rods  Growth in anaerobic bottle: Gram Negative Rods    -  Amikacin: S <=8    -  Ampicillin/Sulbactam: S <=4/2    -  Aztreonam: S <=4    -  Cefazolin: S <=2    -  Cefepime: S <=2    -  Cefoxitin: S <=4    -  Ceftriaxone: S <=1 Enterobacter, Citrobacter, and Serratia may develop resistance during prolonged therapy    -  Ciprofloxacin: S <=0.5    -  Ertapenem: S <=0.5    -  Gentamicin: S <=1    -  Imipenem: S <=1    -  Levofloxacin: S <=1    -  Meropenem: S <=1    -  Piperacillin/Tazobactam: S <=8    -  Tobramycin: S <=2    -  Trimethoprim/Sulfamethoxazole: S <=0.5/9.5    -  Escherichia coli: Detec    Specimen Source: .Blood Blood-Peripheral    Organism: Blood Culture PCR    Organism: Escherichia coli    Culture Results:   Growth in aerobic and anaerobic bottles: Escherichia coli  "Due to technical problems, Proteus sp. will Not be reported as part of  the BC[facility] panel until further notice"  ***Blood Panel PCR results on this specimen are available  approximately 3 hours after the Gram stain result.***  Gram stain, PCR, and/or culture results may not always  correspond due to difference in methodologies.  ************************************************************  This PCR assay was performed using [employer].  The following targets are tested for: Enterococcus,  vancomycin resistant enterococci, Listeria monocytogenes,  coagulase negative staphylococci, S. aureus,  methicillin resistant S. aureus, Streptococcus agalactiae  (Group B), S. pneumoniae, S.pyogenes (Group A),  Acinetobacter baumannii, Enterobacter cloacae, E. coli,  Klebsiella oxytoca, K. pneumoniae, Proteus sp.,  Serratia marcescens, Haemophilus influenzae,  Neisseria meningitidis, Pseudomonas aeruginosa, Candida  albicans, C. glabrata, C krusei, C parapsilosis,  C. tropicalis and the KPC resistance gene.    Organism Identification: Blood Culture PCR  Escherichia coli    Method Type: PCR    Method Type: SNEHA        Culture - Urine (04.12.18 @ 23:11)    -  Amikacin: S <=8    -  Amoxicillin/Clavulanic Acid: S <=8/4    -  Ampicillin: S 4 These ampicillin results predict results for amoxicillin    -  Ampicillin/Sulbactam: S <=4/2    -  Aztreonam: S <=4    -  Cefazolin: S <=2 This predicts results for oral agents cefaclor, cefdinir, cefpodoxime, cefprozil, cefuroxime axetil, cephalexin and locarbef for uncomplicated UTI. Note that some isolates may be susceptible to these agents while testing resistant to cefazolin.    -  Cefepime: S <=2    -  Cefoxitin: S 8    -  Ceftriaxone: S <=1 Enterobacter, Citrobacter, and Serratia may develop resistance during prolonged therapy    -  Ciprofloxacin: S <=0.5    -  Ertapenem: S <=0.5    -  Gentamicin: S 2    -  Imipenem: S <=1    -  Levofloxacin: S <=1    -  Meropenem: S <=1    -  Nitrofurantoin: S <=32 Should not be used to treat pyelonephritis    -  Piperacillin/Tazobactam: S <=8    -  Tigecycline: S <=1    -  Tobramycin: S <=2    -  Trimethoprim/Sulfamethoxazole: S <=0.5/9.5    Specimen Source: .Urine Clean Catch (Midstream)    Culture Results:   >100,000 CFU/ml Escherichia coli    Organism Identification: Escherichia coli    Organism: Escherichia coli    Method Type: SNEHA      RADIOLOGY    CXR:    EXAM:  XR CHEST PA LAT 2V                            PROCEDURE DATE:  04/12/2018          INTERPRETATION:  PA AND LATERAL CHEST X-RAYS    HISTORY: fever.    COMPARISON: None.    FINDINGS:  Lungs are hyperinflated.  No focal lung consolidation. Biapical pleural parenchymal scarring.  No pneumothorax or pleural effusion.   The cardiomediastinal silhouette is within normal limits in size.  Moderate levoscoliosis of the thoracolumbar spine with apex at T12-L1.    IMPRESSION:  No focal lung consolidation. Hyperinflation/emphysema.          EXAM:  US KIDNEY(S)                            PROCEDURE DATE:  04/13/2018          INTERPRETATION:  EXAM: US KIDNEY(S)   INDICATION: left CVA tenderness. Sepsis.  COMPARISON: None.    TECHNIQUE: Grayscale ultrasound of the kidneys was performed.    FINDINGS:    Right kidney: Normal size, measures 12.8 x 5.1 x 5.8 cm. No   hydronephrosis, shadowing calculus, or perinephric fluid collection. Tiny   7 mm round anechoic structure in the lower pole may represent a cyst    Left kidney: Normal size, measures 12.3 x 4.3 x 4.9 cm. No   hydronephrosis, shadowing calculus, or perinephric fluid collection.    Moderately distended urinary bladder is grossly unremarkable.    Visualized proximal abdominal aorta and IVC are grossly unremarkable.    IMPRESSION:   Possible tiny subcentimeter right renal cyst.    No hydronephrosis.          Assessment and Recommendation:   77 years old female from home, walks in dependently, lives with , with PMH of hypothyroidism, hyperlipidemia, and h/o bronchial pneumonia presented to ED c/o chills and rigors for 2 days, and 1 week of stinging urinary sense.  Patient was started on Levaquin, that was subsequently changed to Azactam.  Blood, and urine cultures grew E coli.    Problem/Recommendation - 1:  Problem: Bacteremia due to Escherichia coli.   Recommendation:   1- Follow Repeat blood cultures.  2- Continue Azactam 1 gm IVPB q 8 hourstill 5/18/18 then give Cipro 500 mg po q 12 hours till 4/25/18.  3- Follow +ve blood cultures till final report.    Problem/Recommendation - 2:  ·  Problem: UTI (urinary tract infection).    Recommendation:   1- Follow up +ve urine CS.  2- Blood culture as above.  3- Renal and bladder sonogram result is noted.  4- ABX as above.  5- IV and PO hydration.  6- CBC and BMP follow up.     Problem/Recommendation - 3:  ·  Problem: Hypothyroid.    Recommendation:   1- TSH level monitoring.  2- Synthroid.     Problem/Recommendation - 4:  ·  Problem: HLD (hyperlipidemia).    Recommendation:   1- low fat low cholesterol diet.  2- Lipid profile.  3- Cholesterol medication as per primary care team.     Discussed with medical resident.
PGY 1 Note discussed with supervising resident and primary attending    Patient is a 77y old  Female who presents with a chief complaint of Chills and rigors, urinary burning sense (2018 14:34)      INTERVAL HPI/OVERNIGHT EVENTS: Fever of 101 overnight .     MEDICATIONS  (STANDING):  atorvastatin 10 milliGRAM(s) Oral at bedtime  aztreonam  IVPB 1000 milliGRAM(s) IV Intermittent every 8 hours  enoxaparin Injectable 40 milliGRAM(s) SubCutaneous daily  levothyroxine 50 MICROGram(s) Oral daily  sodium chloride 0.9%. 1000 milliLiter(s) (100 mL/Hr) IV Continuous <Continuous>    MEDICATIONS  (PRN):  acetaminophen   Tablet 650 milliGRAM(s) Oral every 6 hours PRN For Temp greater than 38 C (100.4 F)  ALBUTerol/ipratropium for Nebulization 3 milliLiter(s) Nebulizer every 6 hours PRN Shortness of Breath and/or Wheezing      __________________________________________________  REVIEW OF SYSTEMS: nausea     CONSTITUTIONAL: No fever,   EYES: no acute visual disturbances  NECK: No pain or stiffness  RESPIRATORY: No cough; No shortness of breath  CARDIOVASCULAR: No chest pain, no palpitations  GASTROINTESTINAL: No pain. No nausea or vomiting; No diarrhea   NEUROLOGICAL: No headache or numbness, no tremors  MUSCULOSKELETAL: No joint pain, no muscle pain  GENITOURINARY: no dysuria, no frequency, no hesitancy  PSYCHIATRY: no depression , no anxiety  ALL OTHER  ROS negative        Vital Signs Last 24 Hrs  T(C): 36.5 (15 Apr 2018 07:30), Max: 37 (2018 16:05)  T(F): 97.7 (15 Apr 2018 07:30), Max: 98.6 (2018 16:05)  HR: 57 (15 Apr 2018 07:30) (57 - 73)  BP: 122/60 (15 Apr 2018 07:30) (110/51 - 122/60)  BP(mean): --  RR: 16 (15 Apr 2018 07:30) (16 - 16)  SpO2: 98% (15 Apr 2018 07:30) (97% - 98%)    ________________________________________________  PHYSICAL EXAM:  GENERAL: NAD  HEENT: Normocephalic;  conjunctivae and sclerae clear; moist mucous membranes;   NECK : supple  CHEST/LUNG: Clear to auscultation bilaterally with good air entry   HEART: S1 S2  regular; no murmurs, gallops or rubs  ABDOMEN: Soft, Nontender, Nondistended; Bowel sounds present  EXTREMITIES: no cyanosis; no edema; no calf tenderness  SKIN: warm and dry; no rash  NERVOUS SYSTEM:  Awake and alert; Oriented  to place, person and time ; no new deficits    _________________________________________________  LABS:                        11.3   6.0   )-----------( 224      ( 15 Apr 2018 06:14 )             33.2     04-15    141  |  107  |  12  ----------------------------<  91  3.8   |  27  |  0.87    Ca    8.5      15 Apr 2018 06:14  Phos  3.2     04-15  Mg     2.2     04-15        Urinalysis Basic - ( 2018 08:36 )    Color: Yellow / Appearance: Clear / S.010 / pH: x  Gluc: x / Ketone: Negative  / Bili: Negative / Urobili: Negative   Blood: x / Protein: Negative / Nitrite: Negative   Leuk Esterase: Trace / RBC: 0-2 /HPF / WBC 3-5 /HPF   Sq Epi: x / Non Sq Epi: x / Bacteria: Trace /HPF      CAPILLARY BLOOD GLUCOSE          Consultant(s) Notes Reviewed:   YES    Care Discussed with Consultants : YES    Plan of care was discussed with patient and /or primary care giver; all questions and concerns were addressed and care was aligned with patient's wishes.
PGY 1 Note discussed with supervising resident and primary attending    Patient is a 77y old  Female who presents with a chief complaint of Chills and rigors, urinary burning sense (2018 14:34)      INTERVAL HPI/OVERNIGHT EVENTS: offers no new complaints; current symptoms resolving    MEDICATIONS  (STANDING):  atorvastatin 10 milliGRAM(s) Oral at bedtime  aztreonam  IVPB 1000 milliGRAM(s) IV Intermittent every 8 hours  enoxaparin Injectable 40 milliGRAM(s) SubCutaneous daily  levothyroxine 50 MICROGram(s) Oral daily  sodium chloride 0.9%. 1000 milliLiter(s) (100 mL/Hr) IV Continuous <Continuous>    MEDICATIONS  (PRN):  acetaminophen   Tablet 650 milliGRAM(s) Oral every 6 hours PRN For Temp greater than 38 C (100.4 F)  ALBUTerol/ipratropium for Nebulization 3 milliLiter(s) Nebulizer every 6 hours PRN Shortness of Breath and/or Wheezing      __________________________________________________  REVIEW OF SYSTEMS:    CONSTITUTIONAL: No fever,   EYES: no acute visual disturbances  NECK: No pain or stiffness  RESPIRATORY: No cough; No shortness of breath  CARDIOVASCULAR: No chest pain, no palpitations  GASTROINTESTINAL: No pain. No nausea or vomiting; No diarrhea   NEUROLOGICAL: No headache or numbness, no tremors  MUSCULOSKELETAL: No joint pain, no muscle pain  GENITOURINARY: no dysuria, no frequency, no hesitancy  PSYCHIATRY: no depression , no anxiety  ALL OTHER  ROS negative        Vital Signs Last 24 Hrs  T(C): 36.6 (2018 07:40), Max: 37.3 (15 Apr 2018 23:54)  T(F): 97.9 (2018 07:40), Max: 99.1 (15 Apr 2018 23:54)  HR: 74 (2018 07:40) (60 - 74)  BP: 146/68 (2018 07:40) (106/54 - 146/68)  BP(mean): --  RR: 18 (2018 07:40) (16 - 18)  SpO2: 97% (2018 07:40) (97% - 100%)    ________________________________________________  PHYSICAL EXAM:  GENERAL: NAD  HEENT: Normocephalic;  conjunctivae and sclerae clear; moist mucous membranes;   NECK : supple  CHEST/LUNG: Clear to auscultation bilaterally with good air entry   HEART: S1 S2  regular; no murmurs, gallops or rubs  ABDOMEN: Soft, Nontender, Nondistended; Bowel sounds present  EXTREMITIES: no cyanosis; no edema; no calf tenderness  SKIN: warm and dry; no rash  NERVOUS SYSTEM:  Awake and alert; Oriented  to place, person and time ; no new deficits    _________________________________________________  LABS:                        11.4   6.0   )-----------( 259      ( 2018 06:17 )             34.5     04-16    143  |  107  |  11  ----------------------------<  87  3.6   |  30  |  0.80    Ca    8.6      2018 06:17  Phos  3.5     04-16  Mg     2.3     04-16        Urinalysis Basic - ( 2018 08:36 )    Color: Yellow / Appearance: Clear / S.010 / pH: x  Gluc: x / Ketone: Negative  / Bili: Negative / Urobili: Negative   Blood: x / Protein: Negative / Nitrite: Negative   Leuk Esterase: Trace / RBC: 0-2 /HPF / WBC 3-5 /HPF   Sq Epi: x / Non Sq Epi: x / Bacteria: Trace /HPF      CAPILLARY BLOOD GLUCOSE            RADIOLOGY & ADDITIONAL TESTS:    Imaging Personally Reviewed:  YES    Consultant(s) Notes Reviewed:   YES    Care Discussed with Consultants : YES    Plan of care was discussed with patient and /or primary care giver; all questions and concerns were addressed and care was aligned with patient's wishes.
Patient is a 77y old  Female who presents with a chief complaint of Chills and rigors, urinary burning sense (2018 14:34)      INTERVAL HPI/OVERNIGHT EVENTS: patient  is  feeling better  ID evaluation noted  Blood and  urine  culture  E coli Noted  her  bladder evacuation is  incomplete    T(C): 37 (18 @ 16:05), Max: 38.4 (18 @ 00:10)  HR: 73 (18 @ 16:05) (57 - 73)  BP: 110/51 (18 @ 16:05) (109/48 - 111/57)  RR: 16 (18 @ 16:05) (16 - 18)  SpO2: 98% (18 @ 16:05) (98% - 99%)  Wt(kg): --Vital Signs Last 24 Hrs  T(C): 37 (2018 16:05), Max: 38.4 (2018 00:10)  T(F): 98.6 (2018 16:05), Max: 101.2 (2018 00:10)  HR: 73 (2018 16:05) (57 - 73)  BP: 110/51 (2018 16:05) (109/48 - 111/57)  BP(mean): --  RR: 16 (2018 16:05) (16 - 18)  SpO2: 98% (2018 16:05) (98% - 99%)  I&O's Summary    2018 07:01  -  2018 21:31  --------------------------------------------------------  IN: 0 mL / OUT: 1 mL / NET: -1 mL        LABS:                        11.1   7.8   )-----------( 193      ( 2018 06:17 )             33.3         142  |  109<H>  |  9   ----------------------------<  98  3.7   |  26  |  0.81    Ca    8.1<L>      2018 06:15  Phos  2.7     04-14  Mg     2.3     04-14        Urinalysis Basic - ( 2018 08:36 )    Color: Yellow / Appearance: Clear / S.010 / pH: x  Gluc: x / Ketone: Negative  / Bili: Negative / Urobili: Negative   Blood: x / Protein: Negative / Nitrite: Negative   Leuk Esterase: Trace / RBC: 0-2 /HPF / WBC 3-5 /HPF   Sq Epi: x / Non Sq Epi: x / Bacteria: Trace /HPF      CAPILLARY BLOOD GLUCOSE            Urinalysis Basic - ( 2018 08:36 )    Color: Yellow / Appearance: Clear / S.010 / pH: x  Gluc: x / Ketone: Negative  / Bili: Negative / Urobili: Negative   Blood: x / Protein: Negative / Nitrite: Negative   Leuk Esterase: Trace / RBC: 0-2 /HPF / WBC 3-5 /HPF   Sq Epi: x / Non Sq Epi: x / Bacteria: Trace /HPF  MEDICATIONS  (STANDING):  atorvastatin 10 milliGRAM(s) Oral at bedtime  aztreonam  IVPB 1000 milliGRAM(s) IV Intermittent every 8 hours  enoxaparin Injectable 40 milliGRAM(s) SubCutaneous daily  levothyroxine 50 MICROGram(s) Oral daily  sodium chloride 0.9%. 1000 milliLiter(s) (100 mL/Hr) IV Continuous <Continuous>    MEDICATIONS  (PRN):  acetaminophen   Tablet 650 milliGRAM(s) Oral every 6 hours PRN For Temp greater than 38 C (100.4 F)  ALBUTerol/ipratropium for Nebulization 3 milliLiter(s) Nebulizer every 6 hours PRN Shortness of Breath and/or Wheezing      REVIEW OF SYSTEMS:  CONSTITUTIONAL: No fever, weight loss, or fatigue  EYES: No eye pain, visual disturbances, or discharge  ENMT:  No difficulty hearing, tinnitus, vertigo; No sinus or throat pain  NECK: No pain or stiffness  BREASTS: No pain, masses, or nipple discharge  RESPIRATORY: No cough, wheezing, chills or hemoptysis; No shortness of breath  CARDIOVASCULAR: No chest pain, palpitations, dizziness, or leg swelling  GASTROINTESTINAL: No abdominal or epigastric pain. No nausea, vomiting, or hematemesis; No diarrhea or constipation. No melena or hematochezia.  GENITOURINARY: No dysuria, frequency, hematuria, or incontinence  NEUROLOGICAL: No headaches, memory loss, loss of strength, numbness, or tremors  SKIN: No itching, burning, rashes, or lesions   LYMPH NODES: No enlarged glands  ENDOCRINE: No heat or cold intolerance; No hair loss  MUSCULOSKELETAL: No joint pain or swelling; No muscle, back, or extremity pain  PSYCHIATRIC: No depression, anxiety, mood swings, or difficulty sleeping  HEME/LYMPH: No easy bruising, or bleeding gums  ALLERGY AND IMMUNOLOGIC: No hives or eczema    RADIOLOGY & ADDITIONAL TESTS:    Imaging Personally Reviewed:  [ ] YES  [ ] NO    Consultant(s) Notes Reviewed:  [x ] YES  [ ] NO    PHYSICAL EXAM:  GENERAL: NAD, well-groomed, well-developed  HEAD:  Atraumatic, Normocephalic  EYES: EOMI, PERRLA, conjunctiva and sclera clear  ENMT: No tonsillar erythema, exudates, or enlargement; Moist mucous membranes, Good dentition, No lesions  NECK: Supple, No JVD, Normal thyroid  NERVOUS SYSTEM:  Alert & Oriented X3, Good concentration; Motor Strength 5/5 B/L upper and lower extremities; DTRs 2+ intact and symmetric  CHEST/LUNG: Clear to percussion bilaterally; No rales, rhonchi, wheezing, or rubs  HEART: Regular rate and rhythm; No murmurs, rubs, or gallops  ABDOMEN: Soft, Nontender, Nondistended; Bowel sounds present  EXTREMITIES:  2+ Peripheral Pulses, No clubbing, cyanosis, or edema  LYMPH: No lymphadenopathy noted  SKIN: No rashes or lesions    Care Discussed with Consultants/Other Providers [ x] YES  [ ] NO
Patient is a 77y old  Female who presents with a chief complaint of Chills and rigors, urinary burning sense (2018 22:32)      INTERVAL HPI/OVERNIGHT EVENTS: patient  is  ocntinue to spike fever  chills  last night  c/o left  side  flank pain discomfort    T(C): 38 (18 @ 07:47), Max: 38.9 (18 @ 18:57)  HR: 66 (18 @ 07:47) (66 - 104)  BP: 103/55 (18 @ 07:47) (100/55 - 121/51)  RR: 16 (18 @ 07:47) (16 - 18)  SpO2: 97% (18 @ 07:47) (95% - 99%)  Wt(kg): --Vital Signs Last 24 Hrs  T(C): 38 (2018 07:47), Max: 38.9 (2018 18:57)  T(F): 100.4 (2018 07:47), Max: 102 (2018 18:57)  HR: 66 (2018 07:47) (66 - 104)  BP: 103/55 (2018 07:47) (100/55 - 121/51)  BP(mean): --  RR: 16 (2018 07:47) (16 - 18)  SpO2: 97% (2018 07:47) (95% - 99%)  I&O's Summary      LABS:                        10.8   13.4  )-----------( 162      ( 2018 06:37 )             31.9         137  |  106  |  13  ----------------------------<  110<H>  3.6   |  24  |  0.81    Ca    7.8<L>      2018 06:37  Phos  2.1       Mg     2.2         TPro  7.2  /  Alb  3.6  /  TBili  0.9  /  DBili  x   /  AST  24  /  ALT  33  /  AlkPhos  69  04-12      Urinalysis Basic - ( 2018 18:56 )    Color: Yellow / Appearance: Slightly Turbid / S.005 / pH: x  Gluc: x / Ketone: Small  / Bili: Negative / Urobili: Negative   Blood: x / Protein: 30 mg/dL / Nitrite: Positive   Leuk Esterase: Moderate / RBC: 0-2 /HPF / WBC >50 /HPF   Sq Epi: x / Non Sq Epi: Few /HPF / Bacteria: Moderate /HPF      CAPILLARY BLOOD GLUCOSE            Urinalysis Basic - ( 2018 18:56 )    Color: Yellow / Appearance: Slightly Turbid / S.005 / pH: x  Gluc: x / Ketone: Small  / Bili: Negative / Urobili: Negative   Blood: x / Protein: 30 mg/dL / Nitrite: Positive   Leuk Esterase: Moderate / RBC: 0-2 /HPF / WBC >50 /HPF   Sq Epi: x / Non Sq Epi: Few /HPF / Bacteria: Moderate /HPF    MEDICATIONS  (STANDING):  atorvastatin 10 milliGRAM(s) Oral at bedtime  enoxaparin Injectable 40 milliGRAM(s) SubCutaneous daily  levoFLOXacin IVPB 500 milliGRAM(s) IV Intermittent every 24 hours  levothyroxine 50 MICROGram(s) Oral daily  sodium chloride 0.9%. 1000 milliLiter(s) (100 mL/Hr) IV Continuous <Continuous>    MEDICATIONS  (PRN):  acetaminophen   Tablet 650 milliGRAM(s) Oral every 6 hours PRN For Temp greater than 38 C (100.4 F)    REVIEW OF SYSTEMS:  CONSTITUTIONAL: + fever, weight loss, + fatigue  EYES: No eye pain, visual disturbances, or discharge  ENMT:  No difficulty hearing, tinnitus, vertigo; No sinus or throat pain  NECK: No pain or stiffness  BREASTS: No pain, masses, or nipple discharge  RESPIRATORY: No cough, wheezing, chills or hemoptysis; No shortness of breath  CARDIOVASCULAR: No chest pain, palpitations, dizziness, or leg swelling  GASTROINTESTINAL: No abdominal or epigastric pain. No nausea, vomiting, or hematemesis; No diarrhea or constipation. No melena or hematochezia.  GENITOURINARY: ++ dysuria, frequency, no hematuria, or incontinence  NEUROLOGICAL: No headaches, memory loss, loss of strength, numbness, or tremors  SKIN: No itching, burning, rashes, or lesions   LYMPH NODES: No enlarged glands  ENDOCRINE: No heat or cold intolerance; No hair loss  MUSCULOSKELETAL: No joint pain or swelling; No muscle, back, or extremity pain  PSYCHIATRIC: No depression, anxiety, mood swings, or difficulty sleeping  HEME/LYMPH: No easy bruising, or bleeding gums  ALLERGY AND IMMUNOLOGIC: No hives or eczema    RADIOLOGY & ADDITIONAL TESTS:    Imaging Personally Reviewed:  [ ] YES  [ ] NO    Consultant(s) Notes Reviewed:  [x ] YES  [ ] NO    PHYSICAL EXAM:  GENERAL: NAD, well-groomed, well-developed  HEAD:  Atraumatic, Normocephalic  EYES: EOMI, PERRLA, conjunctiva and sclera clear  ENMT: No tonsillar erythema, exudates, or enlargement; Moist mucous membranes, Good dentition, No lesions  NECK: Supple, No JVD, Normal thyroid  NERVOUS SYSTEM:  Alert & Oriented X3, Good concentration; Motor Strength 5/5 B/L upper and lower extremities; DTRs 2+ intact and symmetric  CHEST/LUNG: Clear to percussion bilaterally; No rales, rhonchi, wheezing, or rubs  HEART: Regular rate and rhythm; No murmurs, rubs, or gallops  ABDOMEN: Soft, Nontender, Nondistended; Bowel sounds present left  flank tenderness    EXTREMITIES:  2+ Peripheral Pulses, No clubbing, cyanosis, or edema  LYMPH: No lymphadenopathy noted  SKIN: No rashes or lesions    Care Discussed with Consultants/Other Providers [ x] YES  [ ] NO

## 2018-04-16 NOTE — PROGRESS NOTE ADULT - PROBLEM SELECTOR PLAN 2
-Meets SIRS criteria with known UTI  -Patient spiking fevers on ultrasound : Will broaden coverage , with Aztreonam and vancomycin , consult Dr. Roman   -s/p 3L IV bolus in ED, c/w IV maintenance  -Tylenol PRN for fever
-Meets SIRS criteria with known UTI  -Patient spiking fevers on ultrasound : Will broaden coverage , with Aztreonam  consult Dr. Roman   -s/p 3L IV bolus in ED, c/w IV maintenance  -Tylenol PRN for fever
-Meets SIRS criteria with known UTI  -Patient spiking fevers on ultrasound : Will broaden coverage , with Aztreonam  consult Dr. Roman   -s/p 3L IV bolus in ED, c/w IV maintenance  -Tylenol PRN for fever
-Meets SIRS criteria with known UTI  -Patient spiking fevers on ultrasound : Will broaden coverage , with Aztreonam and vancomycin , consult Dr. Roman   -s/p 3L IV bolus in ED, c/w IV maintenance  -Tylenol PRN for fever

## 2018-04-16 NOTE — PROGRESS NOTE ADULT - PROBLEM SELECTOR PLAN 3
-Check TSH, and continue home dose synthroid ( 50 mcg )
-Check TSH, and continue home dose synthroid ( 50 mcg )
-Check TSH, and continue home dose synthroid ( 50 mcg )  tsh wnl
-Check TSH, and continue home dose synthroid ( 50 mcg )  tsh wnl

## 2018-04-16 NOTE — PROGRESS NOTE ADULT - PROVIDER SPECIALTY LIST ADULT
Infectious Disease
Internal Medicine

## 2018-04-19 LAB
CULTURE RESULTS: SIGNIFICANT CHANGE UP
CULTURE RESULTS: SIGNIFICANT CHANGE UP
SPECIMEN SOURCE: SIGNIFICANT CHANGE UP
SPECIMEN SOURCE: SIGNIFICANT CHANGE UP

## 2018-06-11 ENCOUNTER — EMERGENCY (EMERGENCY)
Facility: HOSPITAL | Age: 78
LOS: 1 days | Discharge: HOME | End: 2018-06-11
Attending: EMERGENCY MEDICINE
Payer: SELF-PAY

## 2018-06-11 VITALS
TEMPERATURE: 98 F | DIASTOLIC BLOOD PRESSURE: 75 MMHG | RESPIRATION RATE: 18 BRPM | HEART RATE: 73 BPM | OXYGEN SATURATION: 97 % | SYSTOLIC BLOOD PRESSURE: 136 MMHG

## 2018-06-11 VITALS
DIASTOLIC BLOOD PRESSURE: 68 MMHG | SYSTOLIC BLOOD PRESSURE: 144 MMHG | OXYGEN SATURATION: 98 % | RESPIRATION RATE: 20 BRPM | HEART RATE: 69 BPM

## 2018-06-11 DIAGNOSIS — Z90.49 ACQUIRED ABSENCE OF OTHER SPECIFIED PARTS OF DIGESTIVE TRACT: Chronic | ICD-10-CM

## 2018-06-11 PROCEDURE — 73090 X-RAY EXAM OF FOREARM: CPT

## 2018-06-11 PROCEDURE — 70450 CT HEAD/BRAIN W/O DYE: CPT

## 2018-06-11 PROCEDURE — 72125 CT NECK SPINE W/O DYE: CPT | Mod: 26

## 2018-06-11 PROCEDURE — 99284 EMERGENCY DEPT VISIT MOD MDM: CPT

## 2018-06-11 PROCEDURE — 70450 CT HEAD/BRAIN W/O DYE: CPT | Mod: 26

## 2018-06-11 PROCEDURE — 72125 CT NECK SPINE W/O DYE: CPT

## 2018-06-11 PROCEDURE — 73090 X-RAY EXAM OF FOREARM: CPT | Mod: 26,LT

## 2018-06-11 PROCEDURE — 99284 EMERGENCY DEPT VISIT MOD MDM: CPT | Mod: 25

## 2018-06-11 NOTE — ED PROVIDER NOTE - OBJECTIVE STATEMENT
77 year-old female, history of dyslipidemia, hypothyroidism, urosepsis, osteopenia, presents for evaluation s/p fall earlier today. While going down the stairs missed 1 step and fell forward landing onto the L side. Bumped head. No LOC. 77 year-old female, history of dyslipidemia, hypothyroidism, urosepsis, osteopenia, presents for evaluation s/p fall earlier today. While going down the stairs missed 1 step and fell forward landing onto the L side. Bumped head. No LOC. Now c/o mild L forearm pain. Denies headache, visual changes, N/V, dizziness, focal weakness, numbness/tingling or any other complaints.

## 2018-06-11 NOTE — ED PROVIDER NOTE - PHYSICAL EXAMINATION
Head is normocephalic and atraumatic. No head tenderness or skull depression on palpation. Pt is alert and oriented x 3, able to follow commands. No facial asymmetry. Pupils 5 mm equally round with PERRL, no nystagmus, EOM intact. Cranial nerves III-XII grossly intact. Coordination nose-to-finger intact. All limbs equally strong bilaterally 5/5. No pronator drift. No numbness or tingling sensation.  No spinal/paraspinal tenderness. Neck is non-tender, supple with full range of motion. No nuchal rigidity. Scoliotic back without spinal/paraspinal tenderness to palpation. L forearm mild mid-shaft tenderness. L wrist and elbow full range of motion without swelling or tenderness to palpation. No snuffbox tenderness.

## 2018-06-11 NOTE — ED PROVIDER NOTE - ATTENDING CONTRIBUTION TO CARE
I was physically present for the E/M service provided. I agree with above history, physical, and plan which I have reviewed and edited where appropriate. I was physically present for the key portions of the service provided.    Attending Exam - Dr. Casanova: GEN: well appearing, NAD  HEENT: +PERRL, EOMI  RESP: CTAB, no signs of respiratory distress CV: s1s2 RRR ABD: soft/non tender/non distended  MSK: no deformities / swelling, normal range of motion, spine grossly normal NEURO: alert, non focal exam SKIN: normal color / temperature / condition.

## 2018-06-11 NOTE — ED PROVIDER NOTE - MEDICAL DECISION MAKING DETAILS
77 year-old female, presents for eval s/p fall. Well-appearing, no focal neuro deficits. Low suspicion of fracture or acute intra-cranial pathology. PLan: CT, xray, re-assess. Refused Tylenol for pain.

## 2018-06-12 PROBLEM — E03.9 HYPOTHYROIDISM, UNSPECIFIED: Chronic | Status: ACTIVE | Noted: 2018-04-12

## 2018-06-12 PROBLEM — J18.9 PNEUMONIA, UNSPECIFIED ORGANISM: Chronic | Status: ACTIVE | Noted: 2018-04-12

## 2018-07-19 ENCOUNTER — INPATIENT (INPATIENT)
Facility: HOSPITAL | Age: 78
LOS: 4 days | Discharge: HOME | DRG: 194 | End: 2018-07-24
Attending: INTERNAL MEDICINE | Admitting: INTERNAL MEDICINE
Payer: MEDICARE

## 2018-07-19 VITALS — WEIGHT: 121.03 LBS

## 2018-07-19 DIAGNOSIS — J18.1 LOBAR PNEUMONIA, UNSPECIFIED ORGANISM: ICD-10-CM

## 2018-07-19 DIAGNOSIS — E78.5 HYPERLIPIDEMIA, UNSPECIFIED: ICD-10-CM

## 2018-07-19 DIAGNOSIS — Z29.9 ENCOUNTER FOR PROPHYLACTIC MEASURES, UNSPECIFIED: ICD-10-CM

## 2018-07-19 DIAGNOSIS — Z90.49 ACQUIRED ABSENCE OF OTHER SPECIFIED PARTS OF DIGESTIVE TRACT: Chronic | ICD-10-CM

## 2018-07-19 DIAGNOSIS — J42 UNSPECIFIED CHRONIC BRONCHITIS: ICD-10-CM

## 2018-07-19 DIAGNOSIS — E03.9 HYPOTHYROIDISM, UNSPECIFIED: ICD-10-CM

## 2018-07-19 LAB
ALBUMIN SERPL ELPH-MCNC: 3.8 G/DL — SIGNIFICANT CHANGE UP (ref 3.5–5)
ALP SERPL-CCNC: 88 U/L — SIGNIFICANT CHANGE UP (ref 40–120)
ALT FLD-CCNC: 27 U/L DA — SIGNIFICANT CHANGE UP (ref 10–60)
ANION GAP SERPL CALC-SCNC: 8 MMOL/L — SIGNIFICANT CHANGE UP (ref 5–17)
APPEARANCE UR: CLEAR — SIGNIFICANT CHANGE UP
AST SERPL-CCNC: 19 U/L — SIGNIFICANT CHANGE UP (ref 10–40)
BASOPHILS # BLD AUTO: 0.1 K/UL — SIGNIFICANT CHANGE UP (ref 0–0.2)
BASOPHILS NFR BLD AUTO: 1 % — SIGNIFICANT CHANGE UP (ref 0–2)
BILIRUB SERPL-MCNC: 0.4 MG/DL — SIGNIFICANT CHANGE UP (ref 0.2–1.2)
BILIRUB UR-MCNC: NEGATIVE — SIGNIFICANT CHANGE UP
BUN SERPL-MCNC: 18 MG/DL — SIGNIFICANT CHANGE UP (ref 7–18)
CALCIUM SERPL-MCNC: 8.8 MG/DL — SIGNIFICANT CHANGE UP (ref 8.4–10.5)
CHLORIDE SERPL-SCNC: 102 MMOL/L — SIGNIFICANT CHANGE UP (ref 96–108)
CO2 SERPL-SCNC: 26 MMOL/L — SIGNIFICANT CHANGE UP (ref 22–31)
COLOR SPEC: YELLOW — SIGNIFICANT CHANGE UP
CREAT SERPL-MCNC: 0.87 MG/DL — SIGNIFICANT CHANGE UP (ref 0.5–1.3)
DIFF PNL FLD: ABNORMAL
EOSINOPHIL # BLD AUTO: 0.2 K/UL — SIGNIFICANT CHANGE UP (ref 0–0.5)
EOSINOPHIL NFR BLD AUTO: 1.7 % — SIGNIFICANT CHANGE UP (ref 0–6)
GLUCOSE SERPL-MCNC: 105 MG/DL — HIGH (ref 70–99)
GLUCOSE UR QL: NEGATIVE — SIGNIFICANT CHANGE UP
HCT VFR BLD CALC: 38.6 % — SIGNIFICANT CHANGE UP (ref 34.5–45)
HGB BLD-MCNC: 12.7 G/DL — SIGNIFICANT CHANGE UP (ref 11.5–15.5)
KETONES UR-MCNC: NEGATIVE — SIGNIFICANT CHANGE UP
LACTATE SERPL-SCNC: 0.5 MMOL/L — LOW (ref 0.7–2)
LEUKOCYTE ESTERASE UR-ACNC: ABNORMAL
LYMPHOCYTES # BLD AUTO: 1 K/UL — SIGNIFICANT CHANGE UP (ref 1–3.3)
LYMPHOCYTES # BLD AUTO: 8.1 % — LOW (ref 13–44)
MCHC RBC-ENTMCNC: 32.4 PG — SIGNIFICANT CHANGE UP (ref 27–34)
MCHC RBC-ENTMCNC: 32.9 GM/DL — SIGNIFICANT CHANGE UP (ref 32–36)
MCV RBC AUTO: 98.2 FL — SIGNIFICANT CHANGE UP (ref 80–100)
MONOCYTES # BLD AUTO: 1.2 K/UL — HIGH (ref 0–0.9)
MONOCYTES NFR BLD AUTO: 10.4 % — SIGNIFICANT CHANGE UP (ref 2–14)
NEUTROPHILS # BLD AUTO: 9.3 K/UL — HIGH (ref 1.8–7.4)
NEUTROPHILS NFR BLD AUTO: 78.8 % — HIGH (ref 43–77)
NITRITE UR-MCNC: NEGATIVE — SIGNIFICANT CHANGE UP
PH UR: 7 — SIGNIFICANT CHANGE UP (ref 5–8)
PLATELET # BLD AUTO: 237 K/UL — SIGNIFICANT CHANGE UP (ref 150–400)
POTASSIUM SERPL-MCNC: 4.1 MMOL/L — SIGNIFICANT CHANGE UP (ref 3.5–5.3)
POTASSIUM SERPL-SCNC: 4.1 MMOL/L — SIGNIFICANT CHANGE UP (ref 3.5–5.3)
PROT SERPL-MCNC: 7.5 G/DL — SIGNIFICANT CHANGE UP (ref 6–8.3)
PROT UR-MCNC: NEGATIVE — SIGNIFICANT CHANGE UP
RAPID RVP RESULT: SIGNIFICANT CHANGE UP
RBC # BLD: 3.92 M/UL — SIGNIFICANT CHANGE UP (ref 3.8–5.2)
RBC # FLD: 12.1 % — SIGNIFICANT CHANGE UP (ref 10.3–14.5)
SODIUM SERPL-SCNC: 136 MMOL/L — SIGNIFICANT CHANGE UP (ref 135–145)
SP GR SPEC: 1.01 — SIGNIFICANT CHANGE UP (ref 1.01–1.02)
UROBILINOGEN FLD QL: NEGATIVE — SIGNIFICANT CHANGE UP
WBC # BLD: 11.8 K/UL — HIGH (ref 3.8–10.5)
WBC # FLD AUTO: 11.8 K/UL — HIGH (ref 3.8–10.5)

## 2018-07-19 PROCEDURE — 71045 X-RAY EXAM CHEST 1 VIEW: CPT | Mod: 26

## 2018-07-19 PROCEDURE — 99291 CRITICAL CARE FIRST HOUR: CPT

## 2018-07-19 RX ORDER — ATORVASTATIN CALCIUM 80 MG/1
20 TABLET, FILM COATED ORAL AT BEDTIME
Qty: 0 | Refills: 0 | Status: DISCONTINUED | OUTPATIENT
Start: 2018-07-19 | End: 2018-07-24

## 2018-07-19 RX ORDER — ATORVASTATIN CALCIUM 80 MG/1
0 TABLET, FILM COATED ORAL
Qty: 0 | Refills: 0 | COMMUNITY

## 2018-07-19 RX ORDER — IPRATROPIUM/ALBUTEROL SULFATE 18-103MCG
3 AEROSOL WITH ADAPTER (GRAM) INHALATION
Qty: 0 | Refills: 0 | Status: COMPLETED | OUTPATIENT
Start: 2018-07-19 | End: 2018-07-19

## 2018-07-19 RX ORDER — CEFTRIAXONE 500 MG/1
1 INJECTION, POWDER, FOR SOLUTION INTRAMUSCULAR; INTRAVENOUS ONCE
Qty: 0 | Refills: 0 | Status: COMPLETED | OUTPATIENT
Start: 2018-07-19 | End: 2018-07-19

## 2018-07-19 RX ORDER — LEVOTHYROXINE SODIUM 125 MCG
50 TABLET ORAL DAILY
Qty: 0 | Refills: 0 | Status: DISCONTINUED | OUTPATIENT
Start: 2018-07-19 | End: 2018-07-24

## 2018-07-19 RX ORDER — AZITHROMYCIN 500 MG/1
500 TABLET, FILM COATED ORAL ONCE
Qty: 0 | Refills: 0 | Status: COMPLETED | OUTPATIENT
Start: 2018-07-19 | End: 2018-07-19

## 2018-07-19 RX ORDER — ACETAMINOPHEN 500 MG
650 TABLET ORAL ONCE
Qty: 0 | Refills: 0 | Status: COMPLETED | OUTPATIENT
Start: 2018-07-19 | End: 2018-07-19

## 2018-07-19 RX ORDER — SODIUM CHLORIDE 9 MG/ML
3 INJECTION INTRAMUSCULAR; INTRAVENOUS; SUBCUTANEOUS ONCE
Qty: 0 | Refills: 0 | Status: COMPLETED | OUTPATIENT
Start: 2018-07-19 | End: 2018-07-19

## 2018-07-19 RX ORDER — SODIUM CHLORIDE 9 MG/ML
500 INJECTION INTRAMUSCULAR; INTRAVENOUS; SUBCUTANEOUS
Qty: 0 | Refills: 0 | Status: COMPLETED | OUTPATIENT
Start: 2018-07-19 | End: 2018-07-19

## 2018-07-19 RX ORDER — FLUTICASONE PROPIONATE 220 MCG
0 AEROSOL WITH ADAPTER (GRAM) INHALATION
Qty: 0 | Refills: 0 | COMMUNITY

## 2018-07-19 RX ORDER — LEVOTHYROXINE SODIUM 125 MCG
0 TABLET ORAL
Qty: 0 | Refills: 0 | COMMUNITY

## 2018-07-19 RX ORDER — IPRATROPIUM/ALBUTEROL SULFATE 18-103MCG
3 AEROSOL WITH ADAPTER (GRAM) INHALATION EVERY 6 HOURS
Qty: 0 | Refills: 0 | Status: DISCONTINUED | OUTPATIENT
Start: 2018-07-19 | End: 2018-07-23

## 2018-07-19 RX ADMIN — SODIUM CHLORIDE 2000 MILLILITER(S): 9 INJECTION INTRAMUSCULAR; INTRAVENOUS; SUBCUTANEOUS at 19:42

## 2018-07-19 RX ADMIN — CEFTRIAXONE 100 GRAM(S): 500 INJECTION, POWDER, FOR SOLUTION INTRAMUSCULAR; INTRAVENOUS at 19:41

## 2018-07-19 RX ADMIN — Medication 3 MILLILITER(S): at 19:18

## 2018-07-19 RX ADMIN — AZITHROMYCIN 250 MILLIGRAM(S): 500 TABLET, FILM COATED ORAL at 19:24

## 2018-07-19 RX ADMIN — SODIUM CHLORIDE 2000 MILLILITER(S): 9 INJECTION INTRAMUSCULAR; INTRAVENOUS; SUBCUTANEOUS at 19:17

## 2018-07-19 RX ADMIN — SODIUM CHLORIDE 3 MILLILITER(S): 9 INJECTION INTRAMUSCULAR; INTRAVENOUS; SUBCUTANEOUS at 19:15

## 2018-07-19 RX ADMIN — Medication 650 MILLIGRAM(S): at 19:17

## 2018-07-19 RX ADMIN — SODIUM CHLORIDE 2000 MILLILITER(S): 9 INJECTION INTRAMUSCULAR; INTRAVENOUS; SUBCUTANEOUS at 19:18

## 2018-07-19 RX ADMIN — SODIUM CHLORIDE 2000 MILLILITER(S): 9 INJECTION INTRAMUSCULAR; INTRAVENOUS; SUBCUTANEOUS at 19:41

## 2018-07-19 RX ADMIN — Medication 125 MILLIGRAM(S): at 19:17

## 2018-07-19 NOTE — H&P ADULT - NSHPPHYSICALEXAM_GEN_ALL_CORE
· CONSTITUTIONAL: Well appearing, well nourished, awake, alert, oriented to person, place, time/situation and in no apparent distress.  · ENMT: Airway patent, Nasal mucosa clear. Mouth with normal mucosa. Throat has no vesicles, no oropharyngeal exudates and uvula is midline.  · EYES: Clear bilaterally, pupils equal, round and reactive to light.  · CARDIAC: Normal rate, regular rhythm.  Heart sounds S1, S2.  No murmurs, rubs or gallops.  · RESPIRATORY: - - -  · Respiratory Distress: no  · Breath Sounds: WHEEZES  · Wheezes: expiratory, b/l  · GASTROINTESTINAL: Abdomen soft, non-tender, no guarding.  · MUSCULOSKELETAL: Spine appears normal, range of motion is not limited, no muscle or joint tenderness  · NEUROLOGICAL: Alert and oriented, no focal deficits, no motor or sensory deficits.  · SKIN: Skin normal color for race, warm, dry and intact. No evidence of rash. · CONSTITUTIONAL: Well appearing, well nourished, awake, alert, oriented to person, place, time/situation and in no apparent distress.  · ENMT: Airway patent, Nasal mucosa clear. Mouth with normal mucosa. Throat has no vesicles, no oropharyngeal exudates and uvula is midline.  · EYES: Clear bilaterally, pupils equal, round and reactive to light.  · CARDIAC: Normal rate, regular rhythm.  Heart sounds S1, S2.  No murmurs, rubs or gallops.  · RESPIRATORY: no distress, occasional cough on expiration   · Breath Sounds: bronchial breath sounds, no wheezing  · GASTROINTESTINAL: Abdomen soft, non-tender, no guarding, distended in term of distended urinary bladder   · NEUROLOGICAL: Alert and oriented, no focal deficits, no motor or sensory deficits.  · SKIN: Skin normal color for race, warm, dry and intact. No evidence of rash.

## 2018-07-19 NOTE — H&P ADULT - PROBLEM SELECTOR PLAN 1
-Patient presented with fever, cough, SOB and fatigue  -CXR likely RLL infiltrate  -Patient is from community, will treat as community acquired pneumonia  -c/w rocephin & azithromycin  -f/u blood cultures   - c/w  bronchodilatrs, supp o2 if needed -Patient presented with fever, cough, SOB and fatigue  -CXR likely RLL infiltrate  -Patient is from community, will treat as community acquired pneumonia  -c/w rocephin & azithromycin  -RVP negative  -f/u blood cultures   - c/w  bronchodilators, supp o2 if needed -Patient presented with fever, cough, SOB and fatigue  -CXR likely RLL infiltrate  -mild leucocytosis with left shift  -Patient is from community, but has Hx of IV antibiotic use and Hospitalization for UTI in last 90 days, High risk for MDR-Gram-ve organism, will cover with Levofloxacin 750 daily .   -RVP negative  -f/u blood cultures   - c/w  bronchodilators, supp o2 if needed

## 2018-07-19 NOTE — H&P ADULT - PROBLEM SELECTOR PLAN 5
RISK                                                          Points  [] Previous VTE                                           3  [] Thrombophilia                                        2  [] Lower limb paralysis                              2   [] Current Cancer                                       2   [x] Immobilization > 24 hrs                        1  [] ICU/CCU stay > 24 hours                       1  [x] Age > 60                                                   1    improve score 2 points   c/w Lovenox 40 sc

## 2018-07-19 NOTE — H&P ADULT - HISTORY OF PRESENT ILLNESS
Patient is a 77 years old female from home, walks in dependently, lives with , with PMH of hypothyroidism, hyperlipidemia, E-coli bactremia, bronchial pneumonia presented to ED c/o fatigue, cough, chills and fever 101 for x 1 day. Patient was discharged from [facility] in April after being treated for Pyelonephritis secondary to E-coli. Denies chest pain, SOB, abdominal pain, diarrhea, cough, rhinorrhea, or any other symptoms.    In ED, pt had fever of 101.8 F with rectal temp, HR 94, /57, s/p 1 dose Azithromycin and ceftriaxone- s/p 1L NS bolus in ED. Labs significant for WBC of 11.8K with left shift, UA negative,  RVP; negative, CXR has questionable right LL infiltrate. Is admitted to the medicine floor CAP.     ** Primary team please complete med rec when  brings home med, pt does not know the dose of medications so meds were started at the lowest dose. Patient is a 77 years old female from home, walks in dependently, lives with , with PMH of chronic bronchitis x 2 years, hypothyroidism, hyperlipidemia, E-coli bactremia, bronchial pneumonia presented to ED c/o fatigue, non productive cough, chills and fever 101 for x 1 day. Patient has history of sick grand children at home. Patient was discharged from [facility] in April after being treated for Pyelonephritis secondary to E-coli. Denies chest pain, abdominal pain, leg swelling, recent travel, diarrhea ,rhinorrhea, or any other symptoms.  In ED, pt had fever of 101.8 F with rectal temp, HR 94, /57, s/p 1 dose Azithromycin and ceftriaxone- s/p 1L NS bolus in ED. Labs significant for WBC of 11.8K with left shift, UA negative,  RVP; negative, CXR has questionable right LL infiltrate. Is admitted to the medicine floor CAP.

## 2018-07-19 NOTE — H&P ADULT - ATTENDING COMMENTS
above noted Patient is a 77 years old female from home, walks in dependently, lives with , with PMH of chronic bronchitis x 2 years, Emphysema  hypothyroidism, hyperlipidemia, E-coli bactremia, bronchial pneumonia presented to ED c/o fatigue, non productive cough, chills and fever 101 for x 1 day. Patient has history of sick grand children at home. Patient was discharged from [facility] in April after being treated for Pyelonephritis secondary to E-coli. Denies chest pain, abdominal pain, leg swelling, recent travel, diarrhea ,rhinorrhea, or any other symptoms.  In ED, pt had fever of 101.8 F with rectal temp, HR 94, /57, s/p 1 dose Azithromycin and ceftriaxone- s/p 1L NS bolus in ED. Labs significant for WBC of 11.8K with left shift, UA negative,  RVP; negative, CXR has questionable right LL infiltrate. Is admitted to the medicine floor CAP.   Labs  Radiology report  reviewed    Patient  examined      Impression CAP   Chronic  bronchitis  Emphysema   hypothyroidism   HLD   Admit to regular  floor  Follow septic work up Resume  home meds  Agreed with IV antibiotics prescribed  GI DVT prophylaxis

## 2018-07-19 NOTE — ED PROVIDER NOTE - SECONDARY DIAGNOSIS.
Chronic bronchitis, unspecified chronic bronchitis type Pneumonia of right lower lobe due to infectious organism

## 2018-07-19 NOTE — ED PROVIDER NOTE - CARE PLAN
Principal Discharge DX:	Pneumonia of right lower lobe due to infectious organism Principal Discharge DX:	Pneumonia of right lower lobe due to infectious organism  Secondary Diagnosis:	Chronic bronchitis, unspecified chronic bronchitis type Principal Discharge DX:	Sepsis, due to unspecified organism  Secondary Diagnosis:	Chronic bronchitis, unspecified chronic bronchitis type  Secondary Diagnosis:	Pneumonia of right lower lobe due to infectious organism

## 2018-07-19 NOTE — H&P ADULT - PROBLEM SELECTOR PLAN 4
RISK                                                          Points  [] Previous VTE                                           3  [] Thrombophilia                                        2  [] Lower limb paralysis                              2   [] Current Cancer                                       2   [x] Immobilization > 24 hrs                        1  [] ICU/CCU stay > 24 hours                       1  [x] Age > 60                                                   1    improve score 2 points   c/w Lovenox 40 sc c/w synthroid 50 mcg

## 2018-07-19 NOTE — ED PROVIDER NOTE - OBJECTIVE STATEMENT
77 y/o F pt with PMHx of chronic bronchitis presents to ED c/o fatigue, cough, shortness of breath, and fever (Tmax 102.0 at home PTA) x 1 day. Pt reports using nebulizer today with no symptomatic relief.

## 2018-07-19 NOTE — ED PROVIDER NOTE - CRITICAL CARE PROVIDED
interpretation of diagnostic studies/consultation with other physicians/consult w/ pt's family directly relating to pts condition/additional history taking/documentation/direct patient care (not related to procedure)

## 2018-07-19 NOTE — H&P ADULT - ASSESSMENT
Patient is a 77 years old female from home, walks in dependently, lives with , with PMH of hypothyroidism, hyperlipidemia, E-coli bactremia, bronchial pneumonia presented to ED c/o fatigue, cough, chills and fever 101 for x 1 day. Patient was discharged from [facility] in April after being treated for Pyelonephritis secondary to E-coli. Denies chest pain, SOB, abdominal pain, diarrhea, cough, rhinorrhea, or any other symptoms.

## 2018-07-19 NOTE — ED ADULT NURSE REASSESSMENT NOTE - NS ED NURSE REASSESS COMMENT FT1
MD Storm and Obdulio were both asked to modify diet order for this patient.
Pt admitted to ED, all meds administered as ordered. Pt observed ambulating to bathroom, skin intact. Spouse at bedside. Report given to TRIPP Jones by TRIPP Calle (ED RN), awaiting transport. Nursing monitoring continues.

## 2018-07-20 DIAGNOSIS — J18.9 PNEUMONIA, UNSPECIFIED ORGANISM: ICD-10-CM

## 2018-07-20 PROBLEM — E78.5 HYPERLIPIDEMIA, UNSPECIFIED: Chronic | Status: ACTIVE | Noted: 2018-06-11

## 2018-07-20 LAB
ALBUMIN SERPL ELPH-MCNC: 3 G/DL — LOW (ref 3.5–5)
ALP SERPL-CCNC: 68 U/L — SIGNIFICANT CHANGE UP (ref 40–120)
ALT FLD-CCNC: 27 U/L DA — SIGNIFICANT CHANGE UP (ref 10–60)
ANION GAP SERPL CALC-SCNC: 8 MMOL/L — SIGNIFICANT CHANGE UP (ref 5–17)
AST SERPL-CCNC: 18 U/L — SIGNIFICANT CHANGE UP (ref 10–40)
BASOPHILS # BLD AUTO: 0 K/UL — SIGNIFICANT CHANGE UP (ref 0–0.2)
BASOPHILS NFR BLD AUTO: 0.2 % — SIGNIFICANT CHANGE UP (ref 0–2)
BILIRUB SERPL-MCNC: 0.3 MG/DL — SIGNIFICANT CHANGE UP (ref 0.2–1.2)
BUN SERPL-MCNC: 14 MG/DL — SIGNIFICANT CHANGE UP (ref 7–18)
CALCIUM SERPL-MCNC: 8.2 MG/DL — LOW (ref 8.4–10.5)
CHLORIDE SERPL-SCNC: 112 MMOL/L — HIGH (ref 96–108)
CHOLEST SERPL-MCNC: 108 MG/DL — SIGNIFICANT CHANGE UP (ref 10–199)
CO2 SERPL-SCNC: 23 MMOL/L — SIGNIFICANT CHANGE UP (ref 22–31)
CREAT SERPL-MCNC: 0.78 MG/DL — SIGNIFICANT CHANGE UP (ref 0.5–1.3)
CULTURE RESULTS: SIGNIFICANT CHANGE UP
EOSINOPHIL # BLD AUTO: 0 K/UL — SIGNIFICANT CHANGE UP (ref 0–0.5)
EOSINOPHIL NFR BLD AUTO: 0 % — SIGNIFICANT CHANGE UP (ref 0–6)
GLUCOSE SERPL-MCNC: 157 MG/DL — HIGH (ref 70–99)
HCT VFR BLD CALC: 33.6 % — LOW (ref 34.5–45)
HDLC SERPL-MCNC: 64 MG/DL — SIGNIFICANT CHANGE UP (ref 40–125)
HGB BLD-MCNC: 10.8 G/DL — LOW (ref 11.5–15.5)
LIPID PNL WITH DIRECT LDL SERPL: 41 MG/DL — SIGNIFICANT CHANGE UP
LYMPHOCYTES # BLD AUTO: 0.4 K/UL — LOW (ref 1–3.3)
LYMPHOCYTES # BLD AUTO: 3.4 % — LOW (ref 13–44)
MAGNESIUM SERPL-MCNC: 2.2 MG/DL — SIGNIFICANT CHANGE UP (ref 1.6–2.6)
MCHC RBC-ENTMCNC: 31.7 PG — SIGNIFICANT CHANGE UP (ref 27–34)
MCHC RBC-ENTMCNC: 32.1 GM/DL — SIGNIFICANT CHANGE UP (ref 32–36)
MCV RBC AUTO: 98.7 FL — SIGNIFICANT CHANGE UP (ref 80–100)
MONOCYTES # BLD AUTO: 0.4 K/UL — SIGNIFICANT CHANGE UP (ref 0–0.9)
MONOCYTES NFR BLD AUTO: 3.5 % — SIGNIFICANT CHANGE UP (ref 2–14)
NEUTROPHILS # BLD AUTO: 10.6 K/UL — HIGH (ref 1.8–7.4)
NEUTROPHILS NFR BLD AUTO: 92.9 % — HIGH (ref 43–77)
PHOSPHATE SERPL-MCNC: 3.1 MG/DL — SIGNIFICANT CHANGE UP (ref 2.5–4.5)
PLATELET # BLD AUTO: 202 K/UL — SIGNIFICANT CHANGE UP (ref 150–400)
POTASSIUM SERPL-MCNC: 4.1 MMOL/L — SIGNIFICANT CHANGE UP (ref 3.5–5.3)
POTASSIUM SERPL-SCNC: 4.1 MMOL/L — SIGNIFICANT CHANGE UP (ref 3.5–5.3)
PROCALCITONIN SERPL-MCNC: 0.05 NG/ML — SIGNIFICANT CHANGE UP (ref 0.02–0.1)
PROT SERPL-MCNC: 6.3 G/DL — SIGNIFICANT CHANGE UP (ref 6–8.3)
RBC # BLD: 3.41 M/UL — LOW (ref 3.8–5.2)
RBC # FLD: 12.3 % — SIGNIFICANT CHANGE UP (ref 10.3–14.5)
SODIUM SERPL-SCNC: 143 MMOL/L — SIGNIFICANT CHANGE UP (ref 135–145)
SPECIMEN SOURCE: SIGNIFICANT CHANGE UP
TOTAL CHOLESTEROL/HDL RATIO MEASUREMENT: 1.7 RATIO — LOW (ref 3.3–7.1)
TRIGL SERPL-MCNC: 15 MG/DL — SIGNIFICANT CHANGE UP (ref 10–149)
VIT B12 SERPL-MCNC: 788 PG/ML — SIGNIFICANT CHANGE UP (ref 232–1245)
WBC # BLD: 11.4 K/UL — HIGH (ref 3.8–10.5)
WBC # FLD AUTO: 11.4 K/UL — HIGH (ref 3.8–10.5)

## 2018-07-20 RX ORDER — ENOXAPARIN SODIUM 100 MG/ML
40 INJECTION SUBCUTANEOUS DAILY
Qty: 0 | Refills: 0 | Status: DISCONTINUED | OUTPATIENT
Start: 2018-07-20 | End: 2018-07-24

## 2018-07-20 RX ORDER — CEFTRIAXONE 500 MG/1
1 INJECTION, POWDER, FOR SOLUTION INTRAMUSCULAR; INTRAVENOUS EVERY 24 HOURS
Qty: 0 | Refills: 0 | Status: DISCONTINUED | OUTPATIENT
Start: 2018-07-20 | End: 2018-07-24

## 2018-07-20 RX ORDER — AZITHROMYCIN 500 MG/1
500 TABLET, FILM COATED ORAL EVERY 24 HOURS
Qty: 0 | Refills: 0 | Status: DISCONTINUED | OUTPATIENT
Start: 2018-07-20 | End: 2018-07-21

## 2018-07-20 RX ORDER — ACETAMINOPHEN 500 MG
650 TABLET ORAL ONCE
Qty: 0 | Refills: 0 | Status: COMPLETED | OUTPATIENT
Start: 2018-07-20 | End: 2018-07-20

## 2018-07-20 RX ORDER — IBUPROFEN 200 MG
400 TABLET ORAL ONCE
Qty: 0 | Refills: 0 | Status: COMPLETED | OUTPATIENT
Start: 2018-07-20 | End: 2018-07-20

## 2018-07-20 RX ADMIN — Medication 3 MILLILITER(S): at 20:53

## 2018-07-20 RX ADMIN — Medication 50 MICROGRAM(S): at 05:51

## 2018-07-20 RX ADMIN — ENOXAPARIN SODIUM 40 MILLIGRAM(S): 100 INJECTION SUBCUTANEOUS at 11:14

## 2018-07-20 RX ADMIN — Medication 200 MILLIGRAM(S): at 19:43

## 2018-07-20 RX ADMIN — Medication 400 MILLIGRAM(S): at 23:30

## 2018-07-20 RX ADMIN — Medication 3 MILLILITER(S): at 14:26

## 2018-07-20 RX ADMIN — ATORVASTATIN CALCIUM 20 MILLIGRAM(S): 80 TABLET, FILM COATED ORAL at 21:13

## 2018-07-20 RX ADMIN — AZITHROMYCIN 250 MILLIGRAM(S): 500 TABLET, FILM COATED ORAL at 12:00

## 2018-07-20 RX ADMIN — Medication 3 MILLILITER(S): at 09:02

## 2018-07-20 RX ADMIN — CEFTRIAXONE 100 GRAM(S): 500 INJECTION, POWDER, FOR SOLUTION INTRAMUSCULAR; INTRAVENOUS at 11:14

## 2018-07-20 RX ADMIN — Medication 3 MILLILITER(S): at 02:13

## 2018-07-20 RX ADMIN — Medication 650 MILLIGRAM(S): at 21:35

## 2018-07-20 NOTE — PROGRESS NOTE ADULT - PROBLEM SELECTOR PLAN 1
-Patient presented with fever, cough, SOB and fatigue  -CXR likely RLL infiltrate  -mild leucocytosis with left shift  -Patient is from community, but has Hx of IV antibiotic use and Hospitalization for UTI in last 90 days, High risk for MDR-Gram-ve organism, will cover with Levofloxacin 750 daily .   -RVP negative  -f/u blood cultures   - c/w  bronchodilators, supp o2 if needed -CXR shows retrocardiac opacity  -mild leucocytosis noted.  will start on IV rocephin and IV zithromax  -RVP negative  -f/u blood cultures   - c/w  bronchodilators, supp o2 prn  will get pulmonology consult Dr Ramirez

## 2018-07-20 NOTE — PROGRESS NOTE ADULT - SUBJECTIVE AND OBJECTIVE BOX
Resident Note discussed with  primary attending    Patient is a 78y old  Female who presents with a chief complaint of cough fever (2018 21:15)      INTERVAL HPI/OVERNIGHT EVENTS: no new complaints  INCOMPLETE NOTE  MEDICATIONS  (STANDING):  ALBUTerol/ipratropium for Nebulization 3 milliLiter(s) Nebulizer every 6 hours  atorvastatin 20 milliGRAM(s) Oral at bedtime  azithromycin  IVPB 500 milliGRAM(s) IV Intermittent every 24 hours  cefTRIAXone   IVPB 1 Gram(s) IV Intermittent every 24 hours  enoxaparin Injectable 40 milliGRAM(s) SubCutaneous daily  levothyroxine 50 MICROGram(s) Oral daily    MEDICATIONS  (PRN):      __________________________________________________  REVIEW OF SYSTEMS:    CONSTITUTIONAL: No fever,   EYES: no acute visual disturbances  NECK: No pain or stiffness  RESPIRATORY: No cough; No shortness of breath  CARDIOVASCULAR: No chest pain, no palpitations  GASTROINTESTINAL: No pain. No nausea or vomiting; No diarrhea   NEUROLOGICAL: No headache or numbness, no tremors  MUSCULOSKELETAL: No joint pain, no muscle pain  GENITOURINARY: no dysuria, no frequency, no hesitancy  PSYCHIATRY: no depression , no anxiety  ALL OTHER  ROS negative        Vital Signs Last 24 Hrs  T(C): 36.4 (2018 13:37), Max: 38.8 (2018 18:42)  T(F): 97.6 (2018 13:37), Max: 101.8 (2018 18:42)  HR: 73 (2018 13:37) (65 - 94)  BP: 109/64 (2018 13:37) (104/58 - 144/77)  BP(mean): --  RR: 17 (2018 13:37) (16 - 17)  SpO2: 95% (2018 13:37) (93% - 97%)    ________________________________________________  PHYSICAL EXAM:  GENERAL: NAD  HEENT:Normocephalic;  conjunctivae and sclerae clear; moist mucous membranes;   NECK : supple  CHEST/LUNG: Clear to auscuitation bilaterally with good air entry   HEART: S1 S2  regular; no murmurs, gallops or rubs  ABDOMEN: Soft, Nontender, Nondistended; Bowel sounds present  EXTREMITIES: no cyanosis; no edema; no calf tenderness  NERVOUS SYSTEM:  Awake and alert; Oriented  to place, person and time ; no new deficits    _________________________________________________  LABS:                        10.8   11.4  )-----------( 202      ( 2018 07:20 )             33.6     07-20    143  |  112<H>  |  14  ----------------------------<  157<H>  4.1   |  23  |  0.78    Ca    8.2<L>      2018 07:20  Phos  3.1     07-20  Mg     2.2     -20    TPro  6.3  /  Alb  3.0<L>  /  TBili  0.3  /  DBili  x   /  AST  18  /  ALT  27  /  AlkPhos  68  07-20      Urinalysis Basic - ( 2018 19:25 )    Color: Yellow / Appearance: Clear / S.010 / pH: x  Gluc: x / Ketone: Negative  / Bili: Negative / Urobili: Negative   Blood: x / Protein: Negative / Nitrite: Negative   Leuk Esterase: Trace / RBC: 2-5 /HPF / WBC 0-2 /HPF   Sq Epi: x / Non Sq Epi: Occasional /HPF / Bacteria: Few /HPF      CAPILLARY BLOOD GLUCOSE            RADIOLOGY & ADDITIONAL TESTS:    Imaging Personally Reviewed:  YES/NO    Consultant(s) Notes Reviewed:   YES/ No    Care Discussed with Consultants :     Plan of care was discussed with patient and /or primary care giver; all questions and concerns were addressed and care was aligned with patient's wishes. Resident Note discussed with  primary attending    Patient is a 78y old  Female who presents with a chief complaint of cough fever (2018 21:15)      INTERVAL HPI/OVERNIGHT EVENTS: no new complaints overnight. This AM patient c/o cough but better.    MEDICATIONS  (STANDING):  ALBUTerol/ipratropium for Nebulization 3 milliLiter(s) Nebulizer every 6 hours  atorvastatin 20 milliGRAM(s) Oral at bedtime  azithromycin  IVPB 500 milliGRAM(s) IV Intermittent every 24 hours  cefTRIAXone   IVPB 1 Gram(s) IV Intermittent every 24 hours  enoxaparin Injectable 40 milliGRAM(s) SubCutaneous daily  levothyroxine 50 MICROGram(s) Oral daily    MEDICATIONS  (PRN):      __________________________________________________  REVIEW OF SYSTEMS:    CONSTITUTIONAL: No fever.  EYES: no acute visual disturbances  NECK: No pain or stiffness  RESPIRATORY: cough+; No shortness of breath.  CARDIOVASCULAR: No chest pain, no palpitations  GASTROINTESTINAL: No pain. No nausea or vomiting; No diarrhea   NEUROLOGICAL: No headache or numbness, tremors+  MUSCULOSKELETAL: No joint pain, no muscle pain  GENITOURINARY: no dysuria, no frequency, no hesitancy  ALL OTHER  ROS negative        Vital Signs Last 24 Hrs  T(C): 36.4 (2018 13:37), Max: 38.8 (2018 18:42)  T(F): 97.6 (2018 13:37), Max: 101.8 (2018 18:42)  HR: 73 (2018 13:37) (65 - 94)  BP: 109/64 (2018 13:37) (104/58 - 144/77)  BP(mean): --  RR: 17 (2018 13:37) (16 - 17)  SpO2: 95% (2018 13:37) (93% - 97%)    ________________________________________________  PHYSICAL EXAM:  GENERAL: patient lying comfortably in bed, not in distress.  HEENT: Normocephalic; conjunctivae and sclerae clear; moist mucous membranes.  NECK : supple.  CHEST/LUNG: bilateral good air entry, occasional expiratory wheeze noted  HEART: S1 S2  regular; no murmurs  ABDOMEN: Soft, Nontender, Nondistended; Bowel sounds present  EXTREMITIES: no cyanosis; no edema; no calf tenderness  NERVOUS SYSTEM:  Awake and alert; Oriented  to place, person and time ; no new deficits    _________________________________________________  LABS:                        10.8   11.4  )-----------( 202      ( 2018 07:20 )             33.6     07-20    143  |  112<H>  |  14  ----------------------------<  157<H>  4.1   |  23  |  0.78    Ca    8.2<L>      2018 07:20  Phos  3.1     07-20  Mg     2.2     -20    TPro  6.3  /  Alb  3.0<L>  /  TBili  0.3  /  DBili  x   /  AST  18  /  ALT  27  /  AlkPhos  68  07-20      Urinalysis Basic - ( 2018 19:25 )    Color: Yellow / Appearance: Clear / S.010 / pH: x  Gluc: x / Ketone: Negative  / Bili: Negative / Urobili: Negative   Blood: x / Protein: Negative / Nitrite: Negative   Leuk Esterase: Trace / RBC: 2-5 /HPF / WBC 0-2 /HPF   Sq Epi: x / Non Sq Epi: Occasional /HPF / Bacteria: Few /HPF      CAPILLARY BLOOD GLUCOSE            RADIOLOGY & ADDITIONAL TESTS: NO NEW IMAGING STUDIES PERFORMED TODAY.        Plan of care was discussed with patient and /or primary care giver; all questions and concerns were addressed and care was aligned with patient's wishes.

## 2018-07-20 NOTE — PROGRESS NOTE ADULT - SUBJECTIVE AND OBJECTIVE BOX
Patient is a 78y old  Female who presents with a chief complaint of cough fever (2018 21:15)      INTERVAL HPI/OVERNIGHT EVENTS: admitted with left  pneumonia  cough sob  better today IV antibiotics  in progress    T(C): 36.4 (18 @ 13:37), Max: 38.8 (18 @ 18:42)  HR: 73 (18 @ 13:37) (65 - 94)  BP: 109/64 (18 @ 13:37) (104/58 - 144/77)  RR: 17 (18 @ 13:37) (16 - 17)  SpO2: 95% (18 @ 13:37) (93% - 97%)  Wt(kg): --Vital Signs Last 24 Hrs  T(C): 36.4 (2018 13:37), Max: 38.8 (2018 18:42)  T(F): 97.6 (2018 13:37), Max: 101.8 (2018 18:42)  HR: 73 (2018 13:37) (65 - 94)  BP: 109/64 (2018 13:37) (104/58 - 144/77)  BP(mean): --  RR: 17 (2018 13:37) (16 - 17)  SpO2: 95% (2018 13:37) (93% - 97%)  I&O's Summary      LABS:                        10.8   11.4  )-----------( 202      ( 2018 07:20 )             33.6     07-20    143  |  112<H>  |  14  ----------------------------<  157<H>  4.1   |  23  |  0.78    Ca    8.2<L>      2018 07:20  Phos  3.1     07-20  Mg     2.2     07-20    TPro  6.3  /  Alb  3.0<L>  /  TBili  0.3  /  DBili  x   /  AST  18  /  ALT  27  /  AlkPhos  68  07-20      Urinalysis Basic - ( 2018 19:25 )    Color: Yellow / Appearance: Clear / S.010 / pH: x  Gluc: x / Ketone: Negative  / Bili: Negative / Urobili: Negative   Blood: x / Protein: Negative / Nitrite: Negative   Leuk Esterase: Trace / RBC: 2-5 /HPF / WBC 0-2 /HPF   Sq Epi: x / Non Sq Epi: Occasional /HPF / Bacteria: Few /HPF      CAPILLARY BLOOD GLUCOSE      MEDICATIONS  (STANDING):  ALBUTerol/ipratropium for Nebulization 3 milliLiter(s) Nebulizer every 6 hours  atorvastatin 20 milliGRAM(s) Oral at bedtime  azithromycin  IVPB 500 milliGRAM(s) IV Intermittent every 24 hours  cefTRIAXone   IVPB 1 Gram(s) IV Intermittent every 24 hours  enoxaparin Injectable 40 milliGRAM(s) SubCutaneous daily  levothyroxine 50 MICROGram(s) Oral daily    MEDICATIONS  (PRN):        Urinalysis Basic - ( 2018 19:25 )    Color: Yellow / Appearance: Clear / S.010 / pH: x  Gluc: x / Ketone: Negative  / Bili: Negative / Urobili: Negative   Blood: x / Protein: Negative / Nitrite: Negative   Leuk Esterase: Trace / RBC: 2-5 /HPF / WBC 0-2 /HPF   Sq Epi: x / Non Sq Epi: Occasional /HPF / Bacteria: Few /HPF      REVIEW OF SYSTEMS:  CONSTITUTIONAL: No fever, weight loss, or fatigue  EYES: No eye pain, visual disturbances, or discharge  ENMT:  No difficulty hearing, tinnitus, vertigo; No sinus or throat pain  NECK: No pain or stiffness  BREASTS: No pain, masses, or nipple discharge  RESPIRATORY: No cough, wheezing, chills or hemoptysis; No shortness of breath  CARDIOVASCULAR: No chest pain, palpitations, dizziness, or leg swelling  GASTROINTESTINAL: No abdominal or epigastric pain. No nausea, vomiting, or hematemesis; No diarrhea or constipation. No melena or hematochezia.  GENITOURINARY: No dysuria, frequency, hematuria, or incontinence  NEUROLOGICAL: No headaches, memory loss, loss of strength, numbness, or tremors  SKIN: No itching, burning, rashes, or lesions   LYMPH NODES: No enlarged glands  ENDOCRINE: No heat or cold intolerance; No hair loss  MUSCULOSKELETAL: No joint pain or swelling; No muscle, back, or extremity pain  PSYCHIATRIC: No depression, anxiety, mood swings, or difficulty sleeping  HEME/LYMPH: No easy bruising, or bleeding gums  ALLERGY AND IMMUNOLOGIC: No hives or eczema    RADIOLOGY & ADDITIONAL TESTS:    Imaging Personally Reviewed:  [ ] YES  [ ] NO    Consultant(s) Notes Reviewed:  [ ] YES  [x ] NO    PHYSICAL EXAM:  GENERAL: NAD, well-groomed, well-developed  HEAD:  Atraumatic, Normocephalic  EYES: EOMI, PERRLA, conjunctiva and sclera clear  ENMT: No tonsillar erythema, exudates, or enlargement; Moist mucous membranes, Good dentition, No lesions  NECK: Supple, No JVD, Normal thyroid  NERVOUS SYSTEM:  Alert & Oriented X3, Good concentration; Motor Strength 5/5 B/L upper and lower extremities; DTRs 2+ intact and symmetric  CHEST/LUNG: Congestion rhonchi and  wheezing left  lung   HEART: Regular rate and rhythm; No murmurs, rubs, or gallops  ABDOMEN: Soft, Nontender, Nondistended; Bowel sounds present  EXTREMITIES:  2+ Peripheral Pulses, No clubbing, cyanosis, or edema  LYMPH: No lymphadenopathy noted  SKIN: No rashes or lesions    Care Discussed with Consultants/Other Providers [ x] YES  [ ] NO

## 2018-07-21 LAB
ANION GAP SERPL CALC-SCNC: 6 MMOL/L — SIGNIFICANT CHANGE UP (ref 5–17)
BASOPHILS # BLD AUTO: 0.1 K/UL — SIGNIFICANT CHANGE UP (ref 0–0.2)
BASOPHILS NFR BLD AUTO: 0.8 % — SIGNIFICANT CHANGE UP (ref 0–2)
BUN SERPL-MCNC: 15 MG/DL — SIGNIFICANT CHANGE UP (ref 7–18)
CALCIUM SERPL-MCNC: 8.1 MG/DL — LOW (ref 8.4–10.5)
CHLORIDE SERPL-SCNC: 109 MMOL/L — HIGH (ref 96–108)
CO2 SERPL-SCNC: 24 MMOL/L — SIGNIFICANT CHANGE UP (ref 22–31)
CREAT SERPL-MCNC: 0.88 MG/DL — SIGNIFICANT CHANGE UP (ref 0.5–1.3)
EOSINOPHIL # BLD AUTO: 0.1 K/UL — SIGNIFICANT CHANGE UP (ref 0–0.5)
EOSINOPHIL NFR BLD AUTO: 0.4 % — SIGNIFICANT CHANGE UP (ref 0–6)
GLUCOSE SERPL-MCNC: 106 MG/DL — HIGH (ref 70–99)
HCT VFR BLD CALC: 32.3 % — LOW (ref 34.5–45)
HGB BLD-MCNC: 10.5 G/DL — LOW (ref 11.5–15.5)
LYMPHOCYTES # BLD AUTO: 1.4 K/UL — SIGNIFICANT CHANGE UP (ref 1–3.3)
LYMPHOCYTES # BLD AUTO: 9.7 % — LOW (ref 13–44)
MAGNESIUM SERPL-MCNC: 2.2 MG/DL — SIGNIFICANT CHANGE UP (ref 1.6–2.6)
MCHC RBC-ENTMCNC: 32.1 PG — SIGNIFICANT CHANGE UP (ref 27–34)
MCHC RBC-ENTMCNC: 32.6 GM/DL — SIGNIFICANT CHANGE UP (ref 32–36)
MCV RBC AUTO: 98.4 FL — SIGNIFICANT CHANGE UP (ref 80–100)
MONOCYTES # BLD AUTO: 1.5 K/UL — HIGH (ref 0–0.9)
MONOCYTES NFR BLD AUTO: 10.2 % — SIGNIFICANT CHANGE UP (ref 2–14)
NEUTROPHILS # BLD AUTO: 11.3 K/UL — HIGH (ref 1.8–7.4)
NEUTROPHILS NFR BLD AUTO: 78.8 % — HIGH (ref 43–77)
PHOSPHATE SERPL-MCNC: 3.2 MG/DL — SIGNIFICANT CHANGE UP (ref 2.5–4.5)
PLATELET # BLD AUTO: 207 K/UL — SIGNIFICANT CHANGE UP (ref 150–400)
POTASSIUM SERPL-MCNC: 3.9 MMOL/L — SIGNIFICANT CHANGE UP (ref 3.5–5.3)
POTASSIUM SERPL-SCNC: 3.9 MMOL/L — SIGNIFICANT CHANGE UP (ref 3.5–5.3)
RBC # BLD: 3.28 M/UL — LOW (ref 3.8–5.2)
RBC # FLD: 12.2 % — SIGNIFICANT CHANGE UP (ref 10.3–14.5)
SODIUM SERPL-SCNC: 139 MMOL/L — SIGNIFICANT CHANGE UP (ref 135–145)
WBC # BLD: 14.4 K/UL — HIGH (ref 3.8–10.5)
WBC # FLD AUTO: 14.4 K/UL — HIGH (ref 3.8–10.5)

## 2018-07-21 RX ORDER — VANCOMYCIN HCL 1 G
VIAL (EA) INTRAVENOUS
Qty: 0 | Refills: 0 | Status: DISCONTINUED | OUTPATIENT
Start: 2018-07-21 | End: 2018-07-24

## 2018-07-21 RX ORDER — PANTOPRAZOLE SODIUM 20 MG/1
40 TABLET, DELAYED RELEASE ORAL
Qty: 0 | Refills: 0 | Status: DISCONTINUED | OUTPATIENT
Start: 2018-07-21 | End: 2018-07-24

## 2018-07-21 RX ORDER — AZITHROMYCIN 500 MG/1
250 TABLET, FILM COATED ORAL DAILY
Qty: 0 | Refills: 0 | Status: DISCONTINUED | OUTPATIENT
Start: 2018-07-21 | End: 2018-07-24

## 2018-07-21 RX ORDER — VANCOMYCIN HCL 1 G
750 VIAL (EA) INTRAVENOUS ONCE
Qty: 0 | Refills: 0 | Status: COMPLETED | OUTPATIENT
Start: 2018-07-21 | End: 2018-07-21

## 2018-07-21 RX ORDER — VANCOMYCIN HCL 1 G
750 VIAL (EA) INTRAVENOUS EVERY 24 HOURS
Qty: 0 | Refills: 0 | Status: DISCONTINUED | OUTPATIENT
Start: 2018-07-22 | End: 2018-07-24

## 2018-07-21 RX ADMIN — AZITHROMYCIN 250 MILLIGRAM(S): 500 TABLET, FILM COATED ORAL at 13:57

## 2018-07-21 RX ADMIN — Medication 50 MICROGRAM(S): at 05:33

## 2018-07-21 RX ADMIN — Medication 400 MILLIGRAM(S): at 00:00

## 2018-07-21 RX ADMIN — Medication 3 MILLILITER(S): at 15:45

## 2018-07-21 RX ADMIN — CEFTRIAXONE 100 GRAM(S): 500 INJECTION, POWDER, FOR SOLUTION INTRAMUSCULAR; INTRAVENOUS at 13:58

## 2018-07-21 RX ADMIN — ENOXAPARIN SODIUM 40 MILLIGRAM(S): 100 INJECTION SUBCUTANEOUS at 13:58

## 2018-07-21 RX ADMIN — Medication 3 MILLILITER(S): at 09:54

## 2018-07-21 RX ADMIN — Medication 3 MILLILITER(S): at 02:36

## 2018-07-21 RX ADMIN — ATORVASTATIN CALCIUM 20 MILLIGRAM(S): 80 TABLET, FILM COATED ORAL at 22:29

## 2018-07-21 RX ADMIN — Medication 250 MILLIGRAM(S): at 18:29

## 2018-07-21 RX ADMIN — Medication 3 MILLILITER(S): at 20:13

## 2018-07-21 RX ADMIN — Medication 200 MILLIGRAM(S): at 05:33

## 2018-07-21 RX ADMIN — AZITHROMYCIN 250 MILLIGRAM(S): 500 TABLET, FILM COATED ORAL at 22:29

## 2018-07-21 NOTE — PROGRESS NOTE ADULT - SUBJECTIVE AND OBJECTIVE BOX
Patient is a 78y old  Female who presents with a chief complaint of cough fever (19 Jul 2018 21:15)      INTERVAL HPI/OVERNIGHT EVENTS: patient  is  continue  to c/o  productive  cough congestion and  wheezing  Pulmonary and  ID evaluation noted  Increase  WBC   T(C): 37.7 (07-21-18 @ 21:52), Max: 37.7 (07-21-18 @ 21:52)  HR: 81 (07-21-18 @ 21:52) (64 - 87)  BP: 116/59 (07-21-18 @ 21:52) (101/52 - 116/59)  RR: 16 (07-21-18 @ 21:52) (16 - 17)  SpO2: 94% (07-21-18 @ 21:52) (94% - 97%)  Wt(kg): --Vital Signs Last 24 Hrs  T(C): 37.7 (21 Jul 2018 21:52), Max: 37.7 (21 Jul 2018 21:52)  T(F): 99.8 (21 Jul 2018 21:52), Max: 99.8 (21 Jul 2018 21:52)  HR: 81 (21 Jul 2018 21:52) (64 - 87)  BP: 116/59 (21 Jul 2018 21:52) (101/52 - 116/59)  BP(mean): --  RR: 16 (21 Jul 2018 21:52) (16 - 17)  SpO2: 94% (21 Jul 2018 21:52) (94% - 97%)  I&O's Summary      LABS:                        10.5   14.4  )-----------( 207      ( 21 Jul 2018 06:00 )             32.3     07-21    139  |  109<H>  |  15  ----------------------------<  106<H>  3.9   |  24  |  0.88    Ca    8.1<L>      21 Jul 2018 06:00  Phos  3.2     07-21  Mg     2.2     07-21    TPro  6.3  /  Alb  3.0<L>  /  TBili  0.3  /  DBili  x   /  AST  18  /  ALT  27  /  AlkPhos  68  07-20        CAPILLARY BLOOD GLUCOSE    MEDICATIONS  (STANDING):  ALBUTerol/ipratropium for Nebulization 3 milliLiter(s) Nebulizer every 6 hours  atorvastatin 20 milliGRAM(s) Oral at bedtime  azithromycin   Tablet 250 milliGRAM(s) Oral daily  cefTRIAXone   IVPB 1 Gram(s) IV Intermittent every 24 hours  enoxaparin Injectable 40 milliGRAM(s) SubCutaneous daily  levothyroxine 50 MICROGram(s) Oral daily  pantoprazole    Tablet 40 milliGRAM(s) Oral before breakfast  predniSONE   Tablet 20 milliGRAM(s) Oral daily  vancomycin  IVPB        MEDICATIONS  (PRN):  guaiFENesin    Syrup 200 milliGRAM(s) Oral every 6 hours PRN Cough            REVIEW OF SYSTEMS:  CONSTITUTIONAL: + fever,  no weight loss, no fatigue  EYES: No eye pain, visual disturbances, or discharge  ENMT:  No difficulty hearing, tinnitus, vertigo; No sinus or throat pain  NECK: No pain or stiffness  BREASTS: No pain, masses, or nipple discharge  RESPIRATORY: + cough, wheezing, chills no hemoptysis; + shortness of breath  CARDIOVASCULAR: No chest pain, palpitations, dizziness, or leg swelling  GASTROINTESTINAL: No abdominal or epigastric pain. No nausea, vomiting, or hematemesis; No diarrhea or constipation. No melena or hematochezia.  GENITOURINARY: No dysuria, frequency, hematuria, or incontinence  NEUROLOGICAL: No headaches, memory loss, loss of strength, numbness, or tremors  SKIN: No itching, burning, rashes, or lesions   LYMPH NODES: No enlarged glands  ENDOCRINE: No heat or cold intolerance; No hair loss  MUSCULOSKELETAL: No joint pain or swelling; No muscle, back, or extremity pain  PSYCHIATRIC: No depression, anxiety, mood swings, or difficulty sleeping  HEME/LYMPH: No easy bruising, or bleeding gums  ALLERGY AND IMMUNOLOGIC: No hives or eczema    RADIOLOGY & ADDITIONAL TESTS:    Imaging Personally Reviewed:  [ ] YES  [ ] NO    Consultant(s) Notes Reviewed:  [x ] YES  [ ] NO    PHYSICAL EXAM:  GENERAL: NAD, well-groomed, well-developed  HEAD:  Atraumatic, Normocephalic  EYES: EOMI, PERRLA, conjunctiva and sclera clear  ENMT: No tonsillar erythema, exudates, or enlargement; Moist mucous membranes, Good dentition, No lesions  NECK: Supple, No JVD, Normal thyroid  NERVOUS SYSTEM:  Alert & Oriented X3, Good concentration; Motor Strength 5/5 B/L upper and lower extremities; DTRs 2+ intact and symmetric  CHEST/LUNG: Congested  +rhonchi, wheezing, onorubs  HEART: Regular rate and rhythm; No murmurs, rubs, or gallops  ABDOMEN: Soft, Nontender, Nondistended; Bowel sounds present  EXTREMITIES:  2+ Peripheral Pulses, No clubbing, cyanosis, or edema  LYMPH: No lymphadenopathy noted  SKIN: No rashes or lesions    Care Discussed with Consultants/Other Providers [ x] YES  [ ] NO

## 2018-07-21 NOTE — CONSULT NOTE ADULT - ASSESSMENT
Patient is a 77 years old female from home, walks in [facility], lives with , with PMH of chronic bronchitis x 2 years, has been extensively w/u by her pulmonologist in [facility] with no real dxsis ,   hypothyroidism, hyperlipidemia, E-coli bactremia in april 2018 , bronchial pneumonia presented to ED c/o fatigue,  cough productive of green to white phlegm , sob not responding to qvar and nebs,  chills and fever 101 for x 1 day. Patient has history of sick grand children at home. Patient was discharged from [facility] in April after being treated for Pyelonephritis secondary to E-coli. Denies chest pain, abdominal pain, leg swelling, recent travel, diarrhea ,rhinorrhea, or vomiting . has taken the flu and pneumococcal vaccine   In ED, pt had fever of 101.8 F with rectal temp, HR 94, /57, s/p 1 dose Azithromycin and ceftriaxone- s/p 1L NS bolus in ED. Labs significant for WBC of 11.8K with left shift, UA negative,  RVP; negative, CXR has questionable right LL infiltrate. Is admitted to the medicine floor CAP    # likely CAP r/o asp pna . hx of chronic bronchitis for 2 yrs as per pt , improving as per pt . RVP swab neg     PLAN   cont rocephin and zmax . will add iv vanco as pt cont to spike and to rx poss for resistant pneumococcus   consider ct chest   serum procalcitonin   speech and swallow eval   f/u blood cx times 2   urine for legionella ag   serum mycoplasma igm     thnx will f/u   d/w resident

## 2018-07-21 NOTE — CONSULT NOTE ADULT - SUBJECTIVE AND OBJECTIVE BOX
HPI:  Patient is a 77 years old female from home, walks in dependently, lives with , with PMH of chronic bronchitis x 2 years, has been extensively w/u by her pulmonologist in [facility] with no real dxsis ,   hypothyroidism, hyperlipidemia, E-coli bactremia in 2018 , bronchial pneumonia presented to ED c/o fatigue,  cough productive of green to white phlegm , sob not responding to qvar and nebs,  chills and fever 101 for x 1 day. Patient has history of sick grand children at home. Patient was discharged from [facility] in April after being treated for Pyelonephritis secondary to E-coli. Denies chest pain, abdominal pain, leg swelling, recent travel, diarrhea ,rhinorrhea, or vomiting . has taken the flu and pneumococcal vaccine   In ED, pt had fever of 101.8 F with rectal temp, HR 94, /57, s/p 1 dose Azithromycin and ceftriaxone- s/p 1L NS bolus in ED. Labs significant for WBC of 11.8K with left shift, UA negative,  RVP; negative, CXR has questionable right LL infiltrate. Is admitted to the medicine floor CAP. (2018 21:15)ID called for eval of appr ab . says is feeling better with current ab       PAST MEDICAL & SURGICAL HISTORY:  E-coli UTI  Chronic bronchitis  Dyslipidemia  Pneumonia  Hypothyroid  S/P appendectomy    allergy  amoxicillin (Rash) about 20 yrs ago     meds   ALBUTerol/ipratropium for Nebulization 3 milliLiter(s) Nebulizer every 6 hours  atorvastatin 20 milliGRAM(s) Oral at bedtime  azithromycin  IVPB 500 milliGRAM(s) IV Intermittent every 24 hours  cefTRIAXone   IVPB 1 Gram(s) IV Intermittent every 24 hours  enoxaparin Injectable 40 milliGRAM(s) SubCutaneous daily  guaiFENesin    Syrup 200 milliGRAM(s) Oral every 6 hours PRN  levothyroxine 50 MICROGram(s) Oral daily  pantoprazole    Tablet 40 milliGRAM(s) Oral before breakfast  predniSONE   Tablet 20 milliGRAM(s) Oral daily      Social Hx: no smoking no etoh     FAMILY HISTORY:  Family history of emphysema (Father)  mother  with emphysema         ROS  [  ] UNABLE TO ELICIT    General:  [ x ] Fever   [ x ] Malaise did have wt loss but has gained wt since april     Skin: no rash     HEENT:  [ - ] Sore Throat  [-  ] Photophobia	    Chest: [x  ] SOB  [x  ] Cough     Cardiovascular: [ - ] CP	no pnd no rthopnea    Gastrointestinal: [-  ] Abd pain   [-  ] N    [-  ] V   [  -] Diarrhea	    Genitourinary: [ - ] Polyuria   [ - ] Urgency   [ - ] Dysuria    [  -]  Hematuria	    Musculoskeletal:  [-  ] Back Pain	    Neurological: [-  ]Dizziness  [ - ]Visual Disturbance  [-  ]Headaches      PHYSICAL EXAM:    Vital Signs Last 24 Hrs  T(C): 36.9 (2018 05:12), Max: 39 (2018 22:47)  T(F): 98.4 (2018 05:12), Max: 102.2 (2018 22:47)  HR: 64 (2018 05:12) (64 - 84)  BP: 101/52 (2018 05:12) (101/52 - 122/48)  BP(mean): --  RR: 16 (2018 05:12) (16 - 18)  SpO2: 97% (2018 05:12) (97% - 100%)    Constitutional: awake alert oriented times 3   STEPHANY SCLERA anicteric EOMI   LUNGS few rales right side   CVS s1 s2 aud no murmurs   ABDOMEN soft non tender no HSM   NEUROLOGY  no defecits   SKIN no rash   EXTREMITIES no edema no cyanosis         LABS/DIAGNOSTIC TESTS                          10.5   14.4  )-----------( 207      ( 2018 06:00 )             32.3     WBC Count: 14.4 K/uL ( @ 06:00)  WBC Count: 11.4 K/uL ( @ 07:20)  WBC Count: 11.8 K/uL ( @ 19:25)          139  |  109<H>  |  15  ----------------------------<  106<H>  3.9   |  24  |  0.88    Ca    8.1<L>      2018 06:00  Phos  3.2       Mg     2.2         TPro  6.3  /  Alb  3.0<L>  /  TBili  0.3  /  DBili  x   /  AST  18  /  ALT  27  /  AlkPhos  68        Urinalysis Basic - ( 2018 19:25 )    Color: Yellow / Appearance: Clear / S.010 / pH: x  Gluc: x / Ketone: Negative  / Bili: Negative / Urobili: Negative   Blood: x / Protein: Negative / Nitrite: Negative   Leuk Esterase: Trace / RBC: 2-5 /HPF / WBC 0-2 /HPF   Sq Epi: x / Non Sq Epi: Occasional /HPF / Bacteria: Few /HPF        LIVER FUNCTIONS - ( 2018 07:20 )  Alb: 3.0 g/dL / Pro: 6.3 g/dL / ALK PHOS: 68 U/L / ALT: 27 U/L DA / AST: 18 U/L / GGT: x                 LACTATE:Lactate, Blood (18 @ 19:25)    Lactate, Blood: 0.5 mmol/L          Culture Results:   <10,000 CFU/ml urine   Normal Urogenital rhonda present ( @ 23:20)  Culture Results: blood  No growth to date. ( @ 23:08)  Culture Results: blood  No growth to date. ( @ 23:08)    Rapid Respiratory Viral Panel (18 @ 19:25)    Rapid RVP Result: NotDetec: The Film[employer] RVP Rapid uses polymerase chain reaction (PCR) and melt  curve analysis to screen for adenovirus; coronavirus HKU1, NL63, 229E,  OC43; human metapneumovirus (hMPV); human enterovirus/rhinovirus  (Entero/RV); influenza A; influenza A/H1;influenza A/H3; influenza  A/H1-2009; influenza B; parainfluenza viruses 1, 2, 3, 4; respiratory  syncytial virus; Bordetella pertussis; Mycoplasma pneumoniae; and  Chlamydophila pneumoniae.        RADIOLOGY  < from: Xray Chest 1 View AP/PA (18 @ 19:42) >    EXAM:  XR CHEST AP OR PA 1V                            PROCEDURE DATE:  2018          INTERPRETATION:  Clinical information: Short of breath.    Portable semiupright chest    Compared with 2018    Impression: There is mild patchy left retrocardiac opacity which could be   atelectasis or consolidation. Consider follow-up frontal and lateral   chest views. There is no pleural effusion. The cardiomediastinal   silhouette is normal.                CHELSI HOLGUIN M.D., ATTENDING RADIOLOGIST  This document has been electronically signed. 2018  8:57AM    < end of copied text >

## 2018-07-21 NOTE — CONSULT NOTE ADULT - SUBJECTIVE AND OBJECTIVE BOX
Time of visit:800 hrs    CHIEF COMPLAINT: Patient is a 78y old  Female who presents with a chief complaint of cough fever (2018 21:15)      HPI:  Patient is a 77 years old female from home, walks in dependently, lives with , with PMH of chronic bronchitis x 2 years, hypothyroidism, hyperlipidemia, E-coli bactremia, bronchial pneumonia presented to ED c/o fatigue, non productive cough, chills and fever 101 for x 1 day. Patient has history of sick grand children at home. Patient was discharged from [facility] in April after being treated for Pyelonephritis secondary to E-coli. Denies chest pain, abdominal pain, leg swelling, recent travel, diarrhea ,rhinorrhea, or any other symptoms.  In ED, pt had fever of 101.8 F with rectal temp, HR 94, /57, s/p 1 dose Azithromycin and ceftriaxone- s/p 1L NS bolus in ED. Labs significant for WBC of 11.8K with left shift, UA negative,  RVP; negative, CXR has questionable right LL infiltrate. Is admitted to the medicine floor CAP. (2018 21:15)   Patient seen and examined.     PAST MEDICAL & SURGICAL HISTORY:  E-coli UTI  Chronic bronchitis  Dyslipidemia  Pneumonia  Hypothyroid  S/P appendectomy      Allergies    amoxicillin (Rash)    Intolerances        MEDICATIONS  (STANDING):  ALBUTerol/ipratropium for Nebulization 3 milliLiter(s) Nebulizer every 6 hours  atorvastatin 20 milliGRAM(s) Oral at bedtime  azithromycin  IVPB 500 milliGRAM(s) IV Intermittent every 24 hours  cefTRIAXone   IVPB 1 Gram(s) IV Intermittent every 24 hours  enoxaparin Injectable 40 milliGRAM(s) SubCutaneous daily  levothyroxine 50 MICROGram(s) Oral daily      MEDICATIONS  (PRN):  guaiFENesin    Syrup 200 milliGRAM(s) Oral every 6 hours PRN Cough   Medications up to date at time of exam.    Medications up to date at time of exam.    FAMILY HISTORY:  Family history of emphysema (Father)      SOCIAL HISTORY  Smoking History: [ x  ] second hand smoke exposure  Living Condition: [ d  ] apartment, [   ] private house  Work History: retired   Travel History: denies recent travel  Illicit Substance Use: denies  Alcohol Use: denies    REVIEW OF SYSTEMS: as per H/P    CONSTITUTIONAL:  denies fevers, chills, sweats, weight loss    HEENT:  denies diplopia or blurred vision, sore throat or runny nose.    CARDIOVASCULAR:  denies pressure, squeezing, tightness, or heaviness about the chest; no palpitations.    RESPIRATORY:  denies SOB, cough, DIAZ, wheezing.    GASTROINTESTINAL:  denies abdominal pain, nausea, vomiting or diarrhea.    GENITOURINARY: denies dysuria, frequency or urgency.    NEUROLOGIC:  denies numbness, tingling, seizures or weakness.    PSYCHIATRIC:  denies disorder of thought or mood.    MSK: denies swelling, redness      PHYSICAL EXAMINATION:    GENERAL: The patient is a well-developed, well-nourished, in no apparent distress.     Vital Signs Last 24 Hrs  T(C): 36.9 (2018 05:12), Max: 39 (2018 22:47)  T(F): 98.4 (2018 05:12), Max: 102.2 (2018 22:47)  HR: 64 (2018 05:12) (64 - 84)  BP: 101/52 (2018 05:12) (101/52 - 122/48)  BP(mean): --  RR: 16 (2018 05:12) (16 - 18)  SpO2: 97% (2018 05:12) (95% - 100%)   (if applicable)    Chest Tube (if applicable)    HEENT: Head is normocephalic and atraumatic. Extraocular muscles are intact. Mucous membranes are moist.     NECK: Supple, no palpable adenopathy.    LUNGS: fair b/l air entry + b/l rhonchi.    HEART: Regular rate and rhythm without murmur.    ABDOMEN: Soft, nontender, and nondistended.  No hepatosplenomegaly is noted.    EXTREMITIES: Without any cyanosis, clubbing, rash, lesions or edema.    NEUROLOGIC: Awake, alert, oriented.     SKIN: Warm, dry, good turgor.      LABS:                        10.5   14.4  )-----------( 207      ( 2018 06:00 )             32.3     07-    139  |  109<H>  |  15  ----------------------------<  106<H>  3.9   |  24  |  0.88    Ca    8.1<L>      2018 06:00  Phos  3.2     -  Mg     2.2     -    TPro  6.3  /  Alb  3.0<L>  /  TBili  0.3  /  DBili  x   /  AST  18  /  ALT  27  /  AlkPhos  68  -      Urinalysis Basic - ( 2018 19:25 )    Color: Yellow / Appearance: Clear / S.010 / pH: x  Gluc: x / Ketone: Negative  / Bili: Negative / Urobili: Negative   Blood: x / Protein: Negative / Nitrite: Negative   Leuk Esterase: Trace / RBC: 2-5 /HPF / WBC 0-2 /HPF   Sq Epi: x / Non Sq Epi: Occasional /HPF / Bacteria: Few /HPF            Procalcitonin, Serum: 0.05 ng/mL (18 @ 17:09)      MICROBIOLOGY: (if applicable)    RADIOLOGY & ADDITIONAL STUDIES:  EKG:   CXR:< from: Xray Chest 1 View AP/PA (18 @ 19:42) >  INTERPRETATION:  Clinical information: Short of breath.    Portable semiupright chest    Compared with 2018    Impression: There is mild patchy left retrocardiac opacity which could be   atelectasis or consolidation. Consider follow-up frontal and lateral   chest views. There is no pleural effusion. The cardiomediastinal   silhouette is normal.    < end of copied text >    ECHO:    IMPRESSION: 78y Female PAST MEDICAL & SURGICAL HISTORY:  E-coli UTI  Chronic bronchitis  Dyslipidemia  Pneumonia  Hypothyroid  S/P appendectomy   p/w      presented to ED c/o fatigue, non productive cough, chills and fever 101 for x 1 day.  CXR show RLL infiltrate . She also had sick contact with sick children at home, significant second hand smoke exposure. Pat has HCAP since she was recently hospitalized at [facility] for pyelonephritis.  B/L rhonchi probably due to second hand smoke exposure and pna.    Sugg  -consider changing antibx to cover for HCAP instead of CAP  -duoneds  -add prednisone 20 mg daily for 5 days  -f/u cultures  -continue meds  -ivf  -dvt/gi prophy      spoke with dr mcnally

## 2018-07-22 LAB
ANION GAP SERPL CALC-SCNC: 9 MMOL/L — SIGNIFICANT CHANGE UP (ref 5–17)
BASOPHILS # BLD AUTO: 0.1 K/UL — SIGNIFICANT CHANGE UP (ref 0–0.2)
BASOPHILS NFR BLD AUTO: 0.9 % — SIGNIFICANT CHANGE UP (ref 0–2)
BUN SERPL-MCNC: 11 MG/DL — SIGNIFICANT CHANGE UP (ref 7–18)
CALCIUM SERPL-MCNC: 8.7 MG/DL — SIGNIFICANT CHANGE UP (ref 8.4–10.5)
CHLORIDE SERPL-SCNC: 106 MMOL/L — SIGNIFICANT CHANGE UP (ref 96–108)
CO2 SERPL-SCNC: 24 MMOL/L — SIGNIFICANT CHANGE UP (ref 22–31)
CREAT SERPL-MCNC: 0.78 MG/DL — SIGNIFICANT CHANGE UP (ref 0.5–1.3)
EOSINOPHIL # BLD AUTO: 0.4 K/UL — SIGNIFICANT CHANGE UP (ref 0–0.5)
EOSINOPHIL NFR BLD AUTO: 3.8 % — SIGNIFICANT CHANGE UP (ref 0–6)
GLUCOSE SERPL-MCNC: 101 MG/DL — HIGH (ref 70–99)
HCT VFR BLD CALC: 35.3 % — SIGNIFICANT CHANGE UP (ref 34.5–45)
HGB BLD-MCNC: 11.5 G/DL — SIGNIFICANT CHANGE UP (ref 11.5–15.5)
LEGIONELLA AG UR QL: NEGATIVE — SIGNIFICANT CHANGE UP
LYMPHOCYTES # BLD AUTO: 1.4 K/UL — SIGNIFICANT CHANGE UP (ref 1–3.3)
LYMPHOCYTES # BLD AUTO: 14.4 % — SIGNIFICANT CHANGE UP (ref 13–44)
MAGNESIUM SERPL-MCNC: 2.2 MG/DL — SIGNIFICANT CHANGE UP (ref 1.6–2.6)
MCHC RBC-ENTMCNC: 31.9 PG — SIGNIFICANT CHANGE UP (ref 27–34)
MCHC RBC-ENTMCNC: 32.5 GM/DL — SIGNIFICANT CHANGE UP (ref 32–36)
MCV RBC AUTO: 98.1 FL — SIGNIFICANT CHANGE UP (ref 80–100)
MONOCYTES # BLD AUTO: 1.3 K/UL — HIGH (ref 0–0.9)
MONOCYTES NFR BLD AUTO: 13.1 % — SIGNIFICANT CHANGE UP (ref 2–14)
NEUTROPHILS # BLD AUTO: 6.8 K/UL — SIGNIFICANT CHANGE UP (ref 1.8–7.4)
NEUTROPHILS NFR BLD AUTO: 67.8 % — SIGNIFICANT CHANGE UP (ref 43–77)
PHOSPHATE SERPL-MCNC: 3.3 MG/DL — SIGNIFICANT CHANGE UP (ref 2.5–4.5)
PLATELET # BLD AUTO: 231 K/UL — SIGNIFICANT CHANGE UP (ref 150–400)
POTASSIUM SERPL-MCNC: 3.7 MMOL/L — SIGNIFICANT CHANGE UP (ref 3.5–5.3)
POTASSIUM SERPL-SCNC: 3.7 MMOL/L — SIGNIFICANT CHANGE UP (ref 3.5–5.3)
RBC # BLD: 3.6 M/UL — LOW (ref 3.8–5.2)
RBC # FLD: 12.2 % — SIGNIFICANT CHANGE UP (ref 10.3–14.5)
SODIUM SERPL-SCNC: 139 MMOL/L — SIGNIFICANT CHANGE UP (ref 135–145)
WBC # BLD: 10.1 K/UL — SIGNIFICANT CHANGE UP (ref 3.8–10.5)
WBC # FLD AUTO: 10.1 K/UL — SIGNIFICANT CHANGE UP (ref 3.8–10.5)

## 2018-07-22 RX ADMIN — Medication 20 MILLIGRAM(S): at 06:40

## 2018-07-22 RX ADMIN — Medication 200 MILLIGRAM(S): at 20:21

## 2018-07-22 RX ADMIN — ENOXAPARIN SODIUM 40 MILLIGRAM(S): 100 INJECTION SUBCUTANEOUS at 13:33

## 2018-07-22 RX ADMIN — AZITHROMYCIN 250 MILLIGRAM(S): 500 TABLET, FILM COATED ORAL at 15:01

## 2018-07-22 RX ADMIN — ATORVASTATIN CALCIUM 20 MILLIGRAM(S): 80 TABLET, FILM COATED ORAL at 22:07

## 2018-07-22 RX ADMIN — PANTOPRAZOLE SODIUM 40 MILLIGRAM(S): 20 TABLET, DELAYED RELEASE ORAL at 06:40

## 2018-07-22 RX ADMIN — Medication 3 MILLILITER(S): at 14:18

## 2018-07-22 RX ADMIN — Medication 3 MILLILITER(S): at 20:04

## 2018-07-22 RX ADMIN — CEFTRIAXONE 100 GRAM(S): 500 INJECTION, POWDER, FOR SOLUTION INTRAMUSCULAR; INTRAVENOUS at 15:02

## 2018-07-22 RX ADMIN — Medication 250 MILLIGRAM(S): at 13:34

## 2018-07-22 RX ADMIN — Medication 50 MICROGRAM(S): at 06:40

## 2018-07-22 NOTE — PROGRESS NOTE ADULT - SUBJECTIVE AND OBJECTIVE BOX
Patient is a 78y old  Female who presents with a chief complaint of cough fever (19 Jul 2018 21:15)      INTERVAL HPI/OVERNIGHT EVENTS: Continue  to c/o cough on deep inspiration  weakness  Pulmonary follow  up reviewed    T(C): 36.7 (07-22-18 @ 20:47), Max: 37.2 (07-22-18 @ 05:21)  HR: 70 (07-22-18 @ 20:47) (70 - 98)  BP: 113/59 (07-22-18 @ 20:47) (102/55 - 121/62)  RR: 17 (07-22-18 @ 20:47) (16 - 17)  SpO2: 96% (07-22-18 @ 20:47) (95% - 96%)  Wt(kg): --Vital Signs Last 24 Hrs  T(C): 36.7 (22 Jul 2018 20:47), Max: 37.2 (22 Jul 2018 05:21)  T(F): 98.1 (22 Jul 2018 20:47), Max: 98.9 (22 Jul 2018 05:21)  HR: 70 (22 Jul 2018 20:47) (70 - 98)  BP: 113/59 (22 Jul 2018 20:47) (102/55 - 121/62)  BP(mean): --  RR: 17 (22 Jul 2018 20:47) (16 - 17)  SpO2: 96% (22 Jul 2018 20:47) (95% - 96%)  I&O's Summary      LABS:                        11.5   10.1  )-----------( 231      ( 22 Jul 2018 05:58 )             35.3     07-22    139  |  106  |  11  ----------------------------<  101<H>  3.7   |  24  |  0.78    Ca    8.7      22 Jul 2018 05:58  Phos  3.3     07-22  Mg     2.2     07-22          CAPILLARY BLOOD GLUCOSE    MEDICATIONS  (STANDING):  ALBUTerol/ipratropium for Nebulization 3 milliLiter(s) Nebulizer every 6 hours  atorvastatin 20 milliGRAM(s) Oral at bedtime  azithromycin   Tablet 250 milliGRAM(s) Oral daily  cefTRIAXone   IVPB 1 Gram(s) IV Intermittent every 24 hours  enoxaparin Injectable 40 milliGRAM(s) SubCutaneous daily  levothyroxine 50 MICROGram(s) Oral daily  pantoprazole    Tablet 40 milliGRAM(s) Oral before breakfast  predniSONE   Tablet 20 milliGRAM(s) Oral daily  vancomycin  IVPB 750 milliGRAM(s) IV Intermittent every 24 hours  vancomycin  IVPB        MEDICATIONS  (PRN):  guaiFENesin    Syrup 200 milliGRAM(s) Oral every 6 hours PRN Cough            REVIEW OF SYSTEMS:  CONSTITUTIONAL: No fever, weight loss, o+fatigue  EYES: No eye pain, visual disturbances, or discharge  ENMT:  No difficulty hearing, tinnitus, vertigo; No sinus or throat pain  NECK: No pain or stiffness  BREASTS: No pain, masses, or nipple discharge  RESPIRATORY: ++ cough, wheezing, chills no hemoptysis; No shortness of breath  CARDIOVASCULAR: No chest pain, palpitations, dizziness, or leg swelling  GASTROINTESTINAL: No abdominal or epigastric pain. No nausea, vomiting, or hematemesis; No diarrhea or constipation. No melena or hematochezia.  GENITOURINARY: No dysuria, frequency, hematuria, or incontinence  NEUROLOGICAL: No headaches, memory loss, loss of strength, numbness, or tremors  SKIN: No itching, burning, rashes, or lesions   LYMPH NODES: No enlarged glands  ENDOCRINE: No heat or cold intolerance; No hair loss  MUSCULOSKELETAL: No joint pain or swelling; No muscle, back, or extremity pain  PSYCHIATRIC: No depression, anxiety, mood swings, or difficulty sleeping  HEME/LYMPH: No easy bruising, or bleeding gums  ALLERGY AND IMMUNOLOGIC: No hives or eczema    RADIOLOGY & ADDITIONAL TESTS:    Imaging Personally Reviewed:  [ ] YES  [ ] NO    Consultant(s) Notes Reviewed:  [x ] YES  [ ] NO    PHYSICAL EXAM:  GENERAL: NAD, well-groomed, well-developed  HEAD:  Atraumatic, Normocephalic  EYES: EOMI, PERRLA, conjunctiva and sclera clear  ENMT: No tonsillar erythema, exudates, or enlargement; Moist mucous membranes, Good dentition, No lesions  NECK: Supple, No JVD, Normal thyroid  NERVOUS SYSTEM:  Alert & Oriented X3, Good concentration; Motor Strength 5/5 B/L upper and lower extremities; DTRs 2+ intact and symmetric  CHEST/LUNG: +, rhonchi, wheezing, or rubs  left  chest  HEART: Regular rate and rhythm; No murmurs, rubs, or gallops  ABDOMEN: Soft, Nontender, Nondistended; Bowel sounds present  EXTREMITIES:  2+ Peripheral Pulses, No clubbing, cyanosis, or edema  LYMPH: No lymphadenopathy noted  SKIN: No rashes or lesions    Care Discussed with Consultants/Other Providers [ x] YES  [ ] NO

## 2018-07-22 NOTE — PROGRESS NOTE ADULT - SUBJECTIVE AND OBJECTIVE BOX
Time of Visit:  Patient seen and examined.     MEDICATIONS  (STANDING):  ALBUTerol/ipratropium for Nebulization 3 milliLiter(s) Nebulizer every 6 hours  atorvastatin 20 milliGRAM(s) Oral at bedtime  azithromycin   Tablet 250 milliGRAM(s) Oral daily  cefTRIAXone   IVPB 1 Gram(s) IV Intermittent every 24 hours  enoxaparin Injectable 40 milliGRAM(s) SubCutaneous daily  levothyroxine 50 MICROGram(s) Oral daily  pantoprazole    Tablet 40 milliGRAM(s) Oral before breakfast  predniSONE   Tablet 20 milliGRAM(s) Oral daily  vancomycin  IVPB 750 milliGRAM(s) IV Intermittent every 24 hours  vancomycin  IVPB          MEDICATIONS  (PRN):  guaiFENesin    Syrup 200 milliGRAM(s) Oral every 6 hours PRN Cough       Medications up to date at time of exam.      PHYSICAL EXAMINATION:  Patient has no new complaints.  GENERAL: The patient is a well-developed, well-nourished, in no apparent distress.     Vital Signs Last 24 Hrs  T(C): 37.2 (22 Jul 2018 05:21), Max: 37.7 (21 Jul 2018 21:52)  T(F): 98.9 (22 Jul 2018 05:21), Max: 99.8 (21 Jul 2018 21:52)  HR: 98 (22 Jul 2018 05:21) (81 - 98)  BP: 121/62 (22 Jul 2018 05:21) (116/59 - 121/62)  BP(mean): --  RR: 16 (22 Jul 2018 05:21) (16 - 16)  SpO2: 95% (22 Jul 2018 05:21) (94% - 95%)   (if applicable)    Chest Tube (if applicable)    HEENT: Head is normocephalic and atraumatic. Extraocular muscles are intact. Mucous membranes are moist.     NECK: Supple, no palpable adenopathy.    LUNGS: Clear to auscultation, no wheezing, rales, or rhonchi.    HEART: Regular rate and rhythm without murmur.    ABDOMEN: Soft, nontender, and nondistended.  No hepatosplenomegaly is noted.    EXTREMITIES: Without any cyanosis, clubbing, rash, lesions or edema.    NEUROLOGIC: Awake, alert, oriented.     SKIN: Warm, dry, good turgor.      LABS:                        11.5   10.1  )-----------( 231      ( 22 Jul 2018 05:58 )             35.3     07-22    139  |  106  |  11  ----------------------------<  101<H>  3.7   |  24  |  0.78    Ca    8.7      22 Jul 2018 05:58  Phos  3.3     07-22  Mg     2.2     07-22                      Procalcitonin, Serum: 0.05 ng/mL (07-20-18 @ 17:09)      MICROBIOLOGY: (if applicable)    RADIOLOGY & ADDITIONAL STUDIES:  EKG:   CXR:  ECHO:    IMPRESSION: 78y Female PAST MEDICAL & SURGICAL HISTORY:  E-coli UTI  Chronic bronchitis  Dyslipidemia  Pneumonia  Hypothyroid  S/P appendectomy   p/w        presented to ED c/o fatigue, non productive cough, chills and fever 101 for x 1 day.  CXR show RLL infiltrate . She also had sick contact with sick children at home, significant second hand smoke exposure. Pat has HCAP since she was recently hospitalized at [facility] for pyelonephritis.  B/L rhonchi probably due to second hand smoke exposure and pna. Antibx changed by Id . Pat is afebrile and  down trending leukocytosis.    Sugg  -continue antibx as per ID  -duoneds  -add prednisone 20 mg daily for 5 days  -f/u cultures  -continue meds  -ivf  -dvt/gi prophy

## 2018-07-23 LAB
ANION GAP SERPL CALC-SCNC: 8 MMOL/L — SIGNIFICANT CHANGE UP (ref 5–17)
BASOPHILS # BLD AUTO: 0.1 K/UL — SIGNIFICANT CHANGE UP (ref 0–0.2)
BASOPHILS NFR BLD AUTO: 0.8 % — SIGNIFICANT CHANGE UP (ref 0–2)
BUN SERPL-MCNC: 16 MG/DL — SIGNIFICANT CHANGE UP (ref 7–18)
CALCIUM SERPL-MCNC: 8.7 MG/DL — SIGNIFICANT CHANGE UP (ref 8.4–10.5)
CHLORIDE SERPL-SCNC: 106 MMOL/L — SIGNIFICANT CHANGE UP (ref 96–108)
CO2 SERPL-SCNC: 25 MMOL/L — SIGNIFICANT CHANGE UP (ref 22–31)
CREAT SERPL-MCNC: 0.92 MG/DL — SIGNIFICANT CHANGE UP (ref 0.5–1.3)
EOSINOPHIL # BLD AUTO: 0.3 K/UL — SIGNIFICANT CHANGE UP (ref 0–0.5)
EOSINOPHIL NFR BLD AUTO: 4.7 % — SIGNIFICANT CHANGE UP (ref 0–6)
GLUCOSE SERPL-MCNC: 104 MG/DL — HIGH (ref 70–99)
HCT VFR BLD CALC: 36.4 % — SIGNIFICANT CHANGE UP (ref 34.5–45)
HGB BLD-MCNC: 11.9 G/DL — SIGNIFICANT CHANGE UP (ref 11.5–15.5)
LYMPHOCYTES # BLD AUTO: 1.7 K/UL — SIGNIFICANT CHANGE UP (ref 1–3.3)
LYMPHOCYTES # BLD AUTO: 23.6 % — SIGNIFICANT CHANGE UP (ref 13–44)
MAGNESIUM SERPL-MCNC: 2.4 MG/DL — SIGNIFICANT CHANGE UP (ref 1.6–2.6)
MCHC RBC-ENTMCNC: 31.7 PG — SIGNIFICANT CHANGE UP (ref 27–34)
MCHC RBC-ENTMCNC: 32.8 GM/DL — SIGNIFICANT CHANGE UP (ref 32–36)
MCV RBC AUTO: 96.7 FL — SIGNIFICANT CHANGE UP (ref 80–100)
MONOCYTES # BLD AUTO: 0.7 K/UL — SIGNIFICANT CHANGE UP (ref 0–0.9)
MONOCYTES NFR BLD AUTO: 9.2 % — SIGNIFICANT CHANGE UP (ref 2–14)
NEUTROPHILS # BLD AUTO: 4.3 K/UL — SIGNIFICANT CHANGE UP (ref 1.8–7.4)
NEUTROPHILS NFR BLD AUTO: 61.7 % — SIGNIFICANT CHANGE UP (ref 43–77)
PHOSPHATE SERPL-MCNC: 3.9 MG/DL — SIGNIFICANT CHANGE UP (ref 2.5–4.5)
PLATELET # BLD AUTO: 263 K/UL — SIGNIFICANT CHANGE UP (ref 150–400)
POTASSIUM SERPL-MCNC: 3.9 MMOL/L — SIGNIFICANT CHANGE UP (ref 3.5–5.3)
POTASSIUM SERPL-SCNC: 3.9 MMOL/L — SIGNIFICANT CHANGE UP (ref 3.5–5.3)
RBC # BLD: 3.76 M/UL — LOW (ref 3.8–5.2)
RBC # FLD: 12.3 % — SIGNIFICANT CHANGE UP (ref 10.3–14.5)
SODIUM SERPL-SCNC: 139 MMOL/L — SIGNIFICANT CHANGE UP (ref 135–145)
WBC # BLD: 7 K/UL — SIGNIFICANT CHANGE UP (ref 3.8–10.5)
WBC # FLD AUTO: 7 K/UL — SIGNIFICANT CHANGE UP (ref 3.8–10.5)

## 2018-07-23 PROCEDURE — 71250 CT THORAX DX C-: CPT | Mod: 26

## 2018-07-23 RX ORDER — TIOTROPIUM BROMIDE 18 UG/1
1 CAPSULE ORAL; RESPIRATORY (INHALATION) DAILY
Qty: 0 | Refills: 0 | Status: DISCONTINUED | OUTPATIENT
Start: 2018-07-23 | End: 2018-07-24

## 2018-07-23 RX ORDER — ALBUTEROL 90 UG/1
1 AEROSOL, METERED ORAL EVERY 4 HOURS
Qty: 0 | Refills: 0 | Status: DISCONTINUED | OUTPATIENT
Start: 2018-07-23 | End: 2018-07-24

## 2018-07-23 RX ORDER — ALBUTEROL 90 UG/1
1 AEROSOL, METERED ORAL EVERY 4 HOURS
Qty: 0 | Refills: 0 | Status: COMPLETED | OUTPATIENT
Start: 2018-07-23 | End: 2019-06-21

## 2018-07-23 RX ADMIN — Medication 3 MILLILITER(S): at 20:43

## 2018-07-23 RX ADMIN — Medication 50 MICROGRAM(S): at 05:35

## 2018-07-23 RX ADMIN — PANTOPRAZOLE SODIUM 40 MILLIGRAM(S): 20 TABLET, DELAYED RELEASE ORAL at 05:35

## 2018-07-23 RX ADMIN — ENOXAPARIN SODIUM 40 MILLIGRAM(S): 100 INJECTION SUBCUTANEOUS at 13:56

## 2018-07-23 RX ADMIN — ATORVASTATIN CALCIUM 20 MILLIGRAM(S): 80 TABLET, FILM COATED ORAL at 22:01

## 2018-07-23 RX ADMIN — Medication 3 MILLILITER(S): at 08:21

## 2018-07-23 RX ADMIN — Medication 3 MILLILITER(S): at 14:12

## 2018-07-23 RX ADMIN — AZITHROMYCIN 250 MILLIGRAM(S): 500 TABLET, FILM COATED ORAL at 13:56

## 2018-07-23 RX ADMIN — CEFTRIAXONE 100 GRAM(S): 500 INJECTION, POWDER, FOR SOLUTION INTRAMUSCULAR; INTRAVENOUS at 13:57

## 2018-07-23 RX ADMIN — Medication 20 MILLIGRAM(S): at 05:35

## 2018-07-23 RX ADMIN — Medication 250 MILLIGRAM(S): at 13:57

## 2018-07-23 NOTE — PROGRESS NOTE ADULT - NSHPATTENDINGPLANDISCUSS_GEN_ALL_CORE
patient  family medical team

## 2018-07-23 NOTE — PROGRESS NOTE ADULT - ASSESSMENT
Patient is a 77 years old female from home, walks in dependently, lives with , with PMH of hypothyroidism, hyperlipidemia, E-coli bactremia, bronchial pneumonia presented to ED c/o fatigue, cough, chills and fever 101 for x 1 day admitted to medicine floor for management of CAP.

## 2018-07-23 NOTE — PROGRESS NOTE ADULT - PROBLEM SELECTOR PLAN 2
- c/w  bronchodilators duoneb   will continue with prednisone 20 mg daily for total of 5 days  prn oxygen

## 2018-07-23 NOTE — PROGRESS NOTE ADULT - PROBLEM SELECTOR PLAN 1
-no fevers recorded since friday evening.   CT chest s/o Left lower lobe pneumonia.  leucocytosis trending down.  will continue IV rocephin and IV zithromax with iV vancomycin.  legionella negative  blood cultures negative till date  f/u mycoplasma Ag  - c/w  bronchodilators, supp o2 prn  pulmonology consult Dr Ramirez

## 2018-07-23 NOTE — PROGRESS NOTE ADULT - ATTENDING COMMENTS
Pneumonia  Left CA Chrionic  obstructive  Bronchitis    HLD Hypothyroidism stable    Continue  antibiotics  nebuliser treatment  Pulmonary evaluation
Pneumonia  Left CA Chrionic  obstructive  Bronchitis    HLD Hypothyroidism stable    Continue  antibiotics  nebulizer treatment  Management  as  per  Pulmonary and  ID
Pneumonia  Left CA Chrionic  obstructive  Bronchitis    HLD Hypothyroidism stable    Continue  antibiotics  nebulizer treatment  Management  as  per  Pulmonary and  ID
Pneumonia  Left lower lobe  Improving   Chrionic  obstructive  Bronchitis    HLD Hypothyroidism stable    Continue  antibiotics  nebulizer treatment  Management  as  per  Pulmonary and  ID  Dr Ramirez  covering

## 2018-07-23 NOTE — PROGRESS NOTE ADULT - SUBJECTIVE AND OBJECTIVE BOX
Patient seen and examined.     MEDICATIONS  (STANDING):  ALBUTerol/ipratropium for Nebulization 3 milliLiter(s) Nebulizer every 6 hours  atorvastatin 20 milliGRAM(s) Oral at bedtime  azithromycin   Tablet 250 milliGRAM(s) Oral daily  cefTRIAXone   IVPB 1 Gram(s) IV Intermittent every 24 hours  enoxaparin Injectable 40 milliGRAM(s) SubCutaneous daily  levothyroxine 50 MICROGram(s) Oral daily  pantoprazole    Tablet 40 milliGRAM(s) Oral before breakfast  predniSONE   Tablet 20 milliGRAM(s) Oral daily  vancomycin  IVPB 750 milliGRAM(s) IV Intermittent every 24 hours  vancomycin  IVPB          MEDICATIONS  (PRN):  guaiFENesin    Syrup 200 milliGRAM(s) Oral every 6 hours PRN Cough     Medications up to date at time of exam.    PHYSICAL EXAMINATION:  Patient has no new complaints.  GENERAL: The patient is a well-developed, well-nourished, in no apparent distress.     Vital Signs Last 24 Hrs  T(C): 37 (23 Jul 2018 13:59), Max: 37 (23 Jul 2018 13:59)  T(F): 98.6 (23 Jul 2018 13:59), Max: 98.6 (23 Jul 2018 13:59)  HR: 80 (23 Jul 2018 13:59) (61 - 80)  BP: 119/59 (23 Jul 2018 13:59) (113/59 - 124/70)  BP(mean): --  RR: 17 (23 Jul 2018 13:59) (17 - 17)  SpO2: 96% (23 Jul 2018 13:59) (95% - 96%)   (if applicable)    Chest Tube (if applicable)    HEENT: Head is normocephalic and atraumatic.     NECK: Supple, no palpable adenopathy.    LUNGS: Clear to auscultation, no wheezing, rales, +rhonchi +cough    HEART: Regular rate and rhythm without murmur.    ABDOMEN: Soft, nontender, and nondistended.      EXTREMITIES: Without any cyanosis, clubbing, rash, lesions or edema.    NEUROLOGIC: Awake, alert.    SKIN: Warm, dry, good turgor.    LABS:                        11.9   7.0   )-----------( 263      ( 23 Jul 2018 07:35 )             36.4     07-23    139  |  106  |  16  ----------------------------<  104<H>  3.9   |  25  |  0.92    Ca    8.7      23 Jul 2018 07:35  Phos  3.9     07-23  Mg     2.4     07-23                          MICROBIOLOGY: (if applicable)    RADIOLOGY & ADDITIONAL STUDIES:  EKG:   CXR:  ECHO:    IMPRESSION: 78y Female PAST MEDICAL & SURGICAL HISTORY:  E-coli UTI  Chronic bronchitis  Dyslipidemia  Pneumonia  Hypothyroid  S/P appendectomy       presented to ED c/o fatigue, non productive cough, chills and fever 101 for x 1 day.  CXR show RLL infiltrate . She also had sick contact with sick children at home, significant second hand smoke exposure. Pat has HCAP since she was recently hospitalized at [facility] for pyelonephritis.  B/L rhonchi probably due to second hand smoke exposure and pna. Antibx changed by Id . Pat is afebrile and  down trending leukocytosis.    +CECILIA PNA on CT Chest    Sugg  -continue antibx as per ID  -duonebs  -con't w/ steroids  -f/u cultures  -continue meds  -ivf  -dvt/gi prophy Patient seen and examined.     MEDICATIONS  (STANDING):  ALBUTerol/ipratropium for Nebulization 3 milliLiter(s) Nebulizer every 6 hours  atorvastatin 20 milliGRAM(s) Oral at bedtime  azithromycin   Tablet 250 milliGRAM(s) Oral daily  cefTRIAXone   IVPB 1 Gram(s) IV Intermittent every 24 hours  enoxaparin Injectable 40 milliGRAM(s) SubCutaneous daily  levothyroxine 50 MICROGram(s) Oral daily  pantoprazole    Tablet 40 milliGRAM(s) Oral before breakfast  predniSONE   Tablet 20 milliGRAM(s) Oral daily  vancomycin  IVPB 750 milliGRAM(s) IV Intermittent every 24 hours  vancomycin  IVPB          MEDICATIONS  (PRN):  guaiFENesin    Syrup 200 milliGRAM(s) Oral every 6 hours PRN Cough     Medications up to date at time of exam.    PHYSICAL EXAMINATION:  Patient has no new complaints.  GENERAL: The patient is a well-developed, well-nourished, in no apparent distress.     Vital Signs Last 24 Hrs  T(C): 37 (23 Jul 2018 13:59), Max: 37 (23 Jul 2018 13:59)  T(F): 98.6 (23 Jul 2018 13:59), Max: 98.6 (23 Jul 2018 13:59)  HR: 80 (23 Jul 2018 13:59) (61 - 80)  BP: 119/59 (23 Jul 2018 13:59) (113/59 - 124/70)  BP(mean): --  RR: 17 (23 Jul 2018 13:59) (17 - 17)  SpO2: 96% (23 Jul 2018 13:59) (95% - 96%)   (if applicable)    Chest Tube (if applicable)    HEENT: Head is normocephalic and atraumatic.     NECK: Supple, no palpable adenopathy.    LUNGS: Clear to auscultation, no wheezing, rales, +rhonchi +cough    HEART: Regular rate and rhythm without murmur.    ABDOMEN: Soft, nontender, and nondistended.      EXTREMITIES: Without any cyanosis, clubbing, rash, lesions or edema.    NEUROLOGIC: Awake, alert.    SKIN: Warm, dry, good turgor.    LABS:                        11.9   7.0   )-----------( 263      ( 23 Jul 2018 07:35 )             36.4     07-23    139  |  106  |  16  ----------------------------<  104<H>  3.9   |  25  |  0.92    Ca    8.7      23 Jul 2018 07:35  Phos  3.9     07-23  Mg     2.4     07-23                          MICROBIOLOGY: (if applicable)    RADIOLOGY & ADDITIONAL STUDIES:  EKG:   CXR:  ECHO:    IMPRESSION: 78y Female PAST MEDICAL & SURGICAL HISTORY:  E-coli UTI  Chronic bronchitis  Dyslipidemia  Pneumonia  Hypothyroid  S/P appendectomy       presented to ED c/o fatigue, non productive cough, chills and fever 101 for x 1 day.  CXR show RLL infiltrate . She also had sick contact with sick children at home, significant second hand smoke exposure. Pat has HCAP since she was recently hospitalized at [facility] for pyelonephritis.  B/L rhonchi probably due to second hand smoke exposure and pna. Antibx changed by Id . Pat is afebrile and  down trending leukocytosis.    +CECILIA PNA on CT Chest    Sugg  -continue antibx as per ID  -duonebs  -con't w/ steroids  -f/u cultures  -continue meds  -ivf  -dvt/gi prophy    Agree with above assessment and plan as transcribed.

## 2018-07-23 NOTE — PROGRESS NOTE ADULT - SUBJECTIVE AND OBJECTIVE BOX
Resident Note discussed with  primary attending    Patient is a 78y old  Female who presents with a chief complaint of cough fever (19 Jul 2018 21:15)      INTERVAL HPI/ OVERNIGHT EVENTS: no new complaints     MEDICATIONS  (STANDING):  ALBUTerol/ipratropium for Nebulization 3 milliLiter(s) Nebulizer every 6 hours  atorvastatin 20 milliGRAM(s) Oral at bedtime  azithromycin   Tablet 250 milliGRAM(s) Oral daily  cefTRIAXone   IVPB 1 Gram(s) IV Intermittent every 24 hours  enoxaparin Injectable 40 milliGRAM(s) SubCutaneous daily  levothyroxine 50 MICROGram(s) Oral daily  pantoprazole    Tablet 40 milliGRAM(s) Oral before breakfast  predniSONE   Tablet 20 milliGRAM(s) Oral daily  vancomycin  IVPB 750 milliGRAM(s) IV Intermittent every 24 hours  vancomycin  IVPB        MEDICATIONS  (PRN):  guaiFENesin    Syrup 200 milliGRAM(s) Oral every 6 hours PRN Cough      __________________________________________________  REVIEW OF SYSTEMS:    CONSTITUTIONAL: No fever,   EYES: no acute visual disturbances  NECK: No pain or stiffness  RESPIRATORY: No cough; No shortness of breath  CARDIOVASCULAR: No chest pain, no palpitations  GASTROINTESTINAL: No pain. No nausea or vomiting; No diarrhea   NEUROLOGICAL: No headache or numbness, no tremors  MUSCULOSKELETAL: No joint pain, no muscle pain  GENITOURINARY: no dysuria, no frequency, no hesitancy  PSYCHIATRY: no depression , no anxiety  ALL OTHER  ROS negative        Vital Signs Last 24 Hrs  T(C): 37 (23 Jul 2018 13:59), Max: 37 (23 Jul 2018 13:59)  T(F): 98.6 (23 Jul 2018 13:59), Max: 98.6 (23 Jul 2018 13:59)  HR: 80 (23 Jul 2018 13:59) (61 - 80)  BP: 119/59 (23 Jul 2018 13:59) (113/59 - 124/70)  BP(mean): --  RR: 17 (23 Jul 2018 13:59) (17 - 17)  SpO2: 96% (23 Jul 2018 13:59) (95% - 96%)    ________________________________________________  PHYSICAL EXAM:  GENERAL: NAD  HEENT:Normocephalic;  conjunctivae and sclerae clear; moist mucous membranes;   NECK : supple  CHEST/LUNG: Clear to auscuitation bilaterally with good air entry   HEART: S1 S2  regular; no murmurs, gallops or rubs  ABDOMEN: Soft, Nontender, Nondistended; Bowel sounds present  EXTREMITIES: no cyanosis; no edema; no calf tenderness  NERVOUS SYSTEM:  Awake and alert; Oriented  to place, person and time ; no new deficits    _________________________________________________  LABS:                        11.9   7.0   )-----------( 263      ( 23 Jul 2018 07:35 )             36.4     07-23    139  |  106  |  16  ----------------------------<  104<H>  3.9   |  25  |  0.92    Ca    8.7      23 Jul 2018 07:35  Phos  3.9     07-23  Mg     2.4     07-23          CAPILLARY BLOOD GLUCOSE            RADIOLOGY & ADDITIONAL TESTS:    Imaging Personally Reviewed:  YES/NO    Consultant(s) Notes Reviewed:   YES/ No    Care Discussed with Consultants :     Plan of care was discussed with patient and /or primary care giver; all questions and concerns were addressed and care was aligned with patient's wishes. Resident Note discussed with  primary attending    Patient is a 78y old  Female who presents with a chief complaint of cough fever (19 Jul 2018 21:15)      INTERVAL HPI/ OVERNIGHT EVENTS: no new complaints overnight. Patient still c/o cough, but no fevers since friday evening.    MEDICATIONS  (STANDING):  ALBUTerol/ipratropium for Nebulization 3 milliLiter(s) Nebulizer every 6 hours  atorvastatin 20 milliGRAM(s) Oral at bedtime  azithromycin   Tablet 250 milliGRAM(s) Oral daily  cefTRIAXone   IVPB 1 Gram(s) IV Intermittent every 24 hours  enoxaparin Injectable 40 milliGRAM(s) SubCutaneous daily  levothyroxine 50 MICROGram(s) Oral daily  pantoprazole    Tablet 40 milliGRAM(s) Oral before breakfast  predniSONE   Tablet 20 milliGRAM(s) Oral daily  vancomycin  IVPB 750 milliGRAM(s) IV Intermittent every 24 hours  vancomycin  IVPB        MEDICATIONS  (PRN):  guaiFENesin    Syrup 200 milliGRAM(s) Oral every 6 hours PRN Cough      __________________________________________________  REVIEW OF SYSTEMS:    CONSTITUTIONAL: No fever,   EYES: no acute visual disturbances  NECK: No pain or stiffness  RESPIRATORY: cough+; No shortness of breath  CARDIOVASCULAR: No chest pain, no palpitations  GASTROINTESTINAL: No pain. No nausea or vomiting; No diarrhea   NEUROLOGICAL: No headache or numbness, no tremors  MUSCULOSKELETAL: No joint pain, no muscle pain  GENITOURINARY: no dysuria, no frequency, no hesitancy  ALL OTHER  ROS negative        Vital Signs Last 24 Hrs  T(C): 37 (23 Jul 2018 13:59), Max: 37 (23 Jul 2018 13:59)  T(F): 98.6 (23 Jul 2018 13:59), Max: 98.6 (23 Jul 2018 13:59)  HR: 80 (23 Jul 2018 13:59) (61 - 80)  BP: 119/59 (23 Jul 2018 13:59) (113/59 - 124/70)  BP(mean): --  RR: 17 (23 Jul 2018 13:59) (17 - 17)  SpO2: 96% (23 Jul 2018 13:59) (95% - 96%)    ________________________________________________  PHYSICAL EXAM:  GENERAL: patient lying comfortably in bed, not in distress.  HEENT: Normocephalic; conjunctivae and sclerae clear; moist mucous membranes.  NECK : supple.  CHEST/LUNG: bilateral good air entry, occasional expiratory wheeze noted  HEART: S1 S2  regular; no murmurs  ABDOMEN: Soft, Nontender, Nondistended; Bowel sounds present  EXTREMITIES: no cyanosis; no edema; no calf tenderness  NERVOUS SYSTEM:  Awake and alert; Oriented  to place, person and time ; no new deficits    _________________________________________________  LABS:                        11.9   7.0   )-----------( 263      ( 23 Jul 2018 07:35 )             36.4     07-23    139  |  106  |  16  ----------------------------<  104<H>  3.9   |  25  |  0.92    Ca    8.7      23 Jul 2018 07:35  Phos  3.9     07-23  Mg     2.4     07-23          CAPILLARY BLOOD GLUCOSE            RADIOLOGY & ADDITIONAL TESTS: < from: CT Chest No Cont (07.23.18 @ 11:39) >    IMPRESSION:   Left lower lobe pneumonia.    Follow-up CT chest in 6-8 weeks following treatment and clinical   resolution may be obtained to document resolution as clinically   warranted.     < end of copied text >      Imaging Personally Reviewed:  YES    Consultant(s) Notes Reviewed:   YES    Care Discussed with Consultants : YES    Plan of care was discussed with patient and /or primary care giver; all questions and concerns were addressed and care was aligned with patient's wishes.

## 2018-07-24 ENCOUNTER — TRANSCRIPTION ENCOUNTER (OUTPATIENT)
Age: 78
End: 2018-07-24

## 2018-07-24 VITALS
OXYGEN SATURATION: 97 % | DIASTOLIC BLOOD PRESSURE: 64 MMHG | RESPIRATION RATE: 17 BRPM | TEMPERATURE: 99 F | HEART RATE: 79 BPM | SYSTOLIC BLOOD PRESSURE: 127 MMHG

## 2018-07-24 LAB
ANION GAP SERPL CALC-SCNC: 9 MMOL/L — SIGNIFICANT CHANGE UP (ref 5–17)
BASOPHILS # BLD AUTO: 0.1 K/UL — SIGNIFICANT CHANGE UP (ref 0–0.2)
BASOPHILS NFR BLD AUTO: 1.1 % — SIGNIFICANT CHANGE UP (ref 0–2)
BUN SERPL-MCNC: 19 MG/DL — HIGH (ref 7–18)
CALCIUM SERPL-MCNC: 8.8 MG/DL — SIGNIFICANT CHANGE UP (ref 8.4–10.5)
CHLORIDE SERPL-SCNC: 106 MMOL/L — SIGNIFICANT CHANGE UP (ref 96–108)
CO2 SERPL-SCNC: 25 MMOL/L — SIGNIFICANT CHANGE UP (ref 22–31)
CREAT SERPL-MCNC: 0.96 MG/DL — SIGNIFICANT CHANGE UP (ref 0.5–1.3)
EOSINOPHIL # BLD AUTO: 0.4 K/UL — SIGNIFICANT CHANGE UP (ref 0–0.5)
EOSINOPHIL NFR BLD AUTO: 5.7 % — SIGNIFICANT CHANGE UP (ref 0–6)
GLUCOSE SERPL-MCNC: 101 MG/DL — HIGH (ref 70–99)
HCT VFR BLD CALC: 36 % — SIGNIFICANT CHANGE UP (ref 34.5–45)
HGB BLD-MCNC: 11.5 G/DL — SIGNIFICANT CHANGE UP (ref 11.5–15.5)
LYMPHOCYTES # BLD AUTO: 2.3 K/UL — SIGNIFICANT CHANGE UP (ref 1–3.3)
LYMPHOCYTES # BLD AUTO: 30.1 % — SIGNIFICANT CHANGE UP (ref 13–44)
M PNEUMO IGM SER-ACNC: 80 UNITS/ML — SIGNIFICANT CHANGE UP
MAGNESIUM SERPL-MCNC: 2.4 MG/DL — SIGNIFICANT CHANGE UP (ref 1.6–2.6)
MCHC RBC-ENTMCNC: 31.5 PG — SIGNIFICANT CHANGE UP (ref 27–34)
MCHC RBC-ENTMCNC: 32 GM/DL — SIGNIFICANT CHANGE UP (ref 32–36)
MCV RBC AUTO: 98.5 FL — SIGNIFICANT CHANGE UP (ref 80–100)
MONOCYTES # BLD AUTO: 0.7 K/UL — SIGNIFICANT CHANGE UP (ref 0–0.9)
MONOCYTES NFR BLD AUTO: 9.4 % — SIGNIFICANT CHANGE UP (ref 2–14)
MYCOPLASMA AG SPEC QL: NEGATIVE — SIGNIFICANT CHANGE UP
NEUTROPHILS # BLD AUTO: 4 K/UL — SIGNIFICANT CHANGE UP (ref 1.8–7.4)
NEUTROPHILS NFR BLD AUTO: 53.6 % — SIGNIFICANT CHANGE UP (ref 43–77)
PHOSPHATE SERPL-MCNC: 3.6 MG/DL — SIGNIFICANT CHANGE UP (ref 2.5–4.5)
PLATELET # BLD AUTO: 289 K/UL — SIGNIFICANT CHANGE UP (ref 150–400)
POTASSIUM SERPL-MCNC: 3.5 MMOL/L — SIGNIFICANT CHANGE UP (ref 3.5–5.3)
POTASSIUM SERPL-SCNC: 3.5 MMOL/L — SIGNIFICANT CHANGE UP (ref 3.5–5.3)
RBC # BLD: 3.66 M/UL — LOW (ref 3.8–5.2)
RBC # FLD: 12.4 % — SIGNIFICANT CHANGE UP (ref 10.3–14.5)
SODIUM SERPL-SCNC: 140 MMOL/L — SIGNIFICANT CHANGE UP (ref 135–145)
VANCOMYCIN TROUGH SERPL-MCNC: 4.6 UG/ML — LOW (ref 10–20)
WBC # BLD: 7.5 K/UL — SIGNIFICANT CHANGE UP (ref 3.8–10.5)
WBC # FLD AUTO: 7.5 K/UL — SIGNIFICANT CHANGE UP (ref 3.8–10.5)

## 2018-07-24 PROCEDURE — 83735 ASSAY OF MAGNESIUM: CPT

## 2018-07-24 PROCEDURE — 82607 VITAMIN B-12: CPT

## 2018-07-24 PROCEDURE — 87486 CHLMYD PNEUM DNA AMP PROBE: CPT

## 2018-07-24 PROCEDURE — 99291 CRITICAL CARE FIRST HOUR: CPT | Mod: 25

## 2018-07-24 PROCEDURE — 80061 LIPID PANEL: CPT

## 2018-07-24 PROCEDURE — 86738 MYCOPLASMA ANTIBODY: CPT

## 2018-07-24 PROCEDURE — 84100 ASSAY OF PHOSPHORUS: CPT

## 2018-07-24 PROCEDURE — 87040 BLOOD CULTURE FOR BACTERIA: CPT

## 2018-07-24 PROCEDURE — 96374 THER/PROPH/DIAG INJ IV PUSH: CPT

## 2018-07-24 PROCEDURE — 71250 CT THORAX DX C-: CPT

## 2018-07-24 PROCEDURE — 83605 ASSAY OF LACTIC ACID: CPT

## 2018-07-24 PROCEDURE — 80202 ASSAY OF VANCOMYCIN: CPT

## 2018-07-24 PROCEDURE — 87581 M.PNEUMON DNA AMP PROBE: CPT

## 2018-07-24 PROCEDURE — 81001 URINALYSIS AUTO W/SCOPE: CPT

## 2018-07-24 PROCEDURE — 84145 PROCALCITONIN (PCT): CPT

## 2018-07-24 PROCEDURE — 87086 URINE CULTURE/COLONY COUNT: CPT

## 2018-07-24 PROCEDURE — 94640 AIRWAY INHALATION TREATMENT: CPT

## 2018-07-24 PROCEDURE — 85027 COMPLETE CBC AUTOMATED: CPT

## 2018-07-24 PROCEDURE — 93005 ELECTROCARDIOGRAM TRACING: CPT

## 2018-07-24 PROCEDURE — 80048 BASIC METABOLIC PNL TOTAL CA: CPT

## 2018-07-24 PROCEDURE — 96375 TX/PRO/DX INJ NEW DRUG ADDON: CPT

## 2018-07-24 PROCEDURE — 87798 DETECT AGENT NOS DNA AMP: CPT

## 2018-07-24 PROCEDURE — 87449 NOS EACH ORGANISM AG IA: CPT

## 2018-07-24 PROCEDURE — 87633 RESP VIRUS 12-25 TARGETS: CPT

## 2018-07-24 PROCEDURE — 71045 X-RAY EXAM CHEST 1 VIEW: CPT

## 2018-07-24 PROCEDURE — 80053 COMPREHEN METABOLIC PANEL: CPT

## 2018-07-24 RX ORDER — CEFUROXIME AXETIL 250 MG
1 TABLET ORAL
Qty: 8 | Refills: 0
Start: 2018-07-24 | End: 2018-07-27

## 2018-07-24 RX ORDER — ALBUTEROL 90 UG/1
1 AEROSOL, METERED ORAL
Qty: 0 | Refills: 0 | DISCHARGE
Start: 2018-07-24

## 2018-07-24 RX ORDER — PANTOPRAZOLE SODIUM 20 MG/1
1 TABLET, DELAYED RELEASE ORAL
Qty: 5 | Refills: 0
Start: 2018-07-24 | End: 2018-07-28

## 2018-07-24 RX ADMIN — AZITHROMYCIN 250 MILLIGRAM(S): 500 TABLET, FILM COATED ORAL at 11:20

## 2018-07-24 RX ADMIN — ALBUTEROL 1 PUFF(S): 90 AEROSOL, METERED ORAL at 11:21

## 2018-07-24 RX ADMIN — TIOTROPIUM BROMIDE 1 CAPSULE(S): 18 CAPSULE ORAL; RESPIRATORY (INHALATION) at 11:20

## 2018-07-24 RX ADMIN — Medication 250 MILLIGRAM(S): at 13:21

## 2018-07-24 RX ADMIN — Medication 20 MILLIGRAM(S): at 05:37

## 2018-07-24 RX ADMIN — Medication 50 MICROGRAM(S): at 05:37

## 2018-07-24 RX ADMIN — PANTOPRAZOLE SODIUM 40 MILLIGRAM(S): 20 TABLET, DELAYED RELEASE ORAL at 05:37

## 2018-07-24 RX ADMIN — CEFTRIAXONE 100 GRAM(S): 500 INJECTION, POWDER, FOR SOLUTION INTRAMUSCULAR; INTRAVENOUS at 11:21

## 2018-07-24 NOTE — PROGRESS NOTE ADULT - ASSESSMENT
Patient is a 77 years old female from home, walks in dependently, lives with , with PMH of hypothyroidism, hyperlipidemia, E-coli bactremia, bronchial pneumonia presented to ED c/o fatigue, cough, chills and fever 101 for x 1 day admitted to medicine floor for management of CAP. Patient is a 77 years old female from home, walks in dependently, lives with , with PMH of hypothyroidism, hyperlipidemia, E-coli bactremia, bronchial pneumonia presented to ED c/o fatigue, cough, chills and fever 101 for x 1 day admitted to medicine floor for management of CAP.  for a full account of hospital course please refer to actual medical records for this is a brief summery

## 2018-07-24 NOTE — DISCHARGE NOTE ADULT - CARE PLAN
Principal Discharge DX:	Pneumonia  Secondary Diagnosis:	Chronic bronchitis  Secondary Diagnosis:	Hypothyroid Principal Discharge DX:	Pneumonia  Goal:	resolution of infection  Assessment and plan of treatment:	we found that you had left sided pneumonia and was started on IV antibiotics, your symptoms improved , hence recommended to continue XXXXX  Secondary Diagnosis:	Chronic bronchitis  Goal:	control of symptoms  Assessment and plan of treatment:	advise to continue cough syrup as needed and continue duoneb inhalation. advise to follow up with PCP/ pulmonologist within a week of discharge.  Secondary Diagnosis:	Hypothyroid  Goal:	control of TSH within normal range  Assessment and plan of treatment:	advise to continue home dose of synthroid and please follow up with PCP with in 1 week of discharge. Principal Discharge DX:	Pneumonia  Goal:	resolution of infection  Assessment and plan of treatment:	we found that you had left sided pneumonia and was started on IV antibiotics, your symptoms improved , hence recommended to continue ceftin and doxycycline for another 4 days.  Secondary Diagnosis:	Chronic bronchitis  Goal:	control of symptoms  Assessment and plan of treatment:	advise to continue cough syrup as needed and continue duoneb inhalation. advise to follow up with PCP/ pulmonologist within a week of discharge.  Secondary Diagnosis:	Hypothyroid  Goal:	control of TSH within normal range  Assessment and plan of treatment:	advise to continue home dose of synthroid and please follow up with PCP with in 1 week of discharge.

## 2018-07-24 NOTE — PROGRESS NOTE ADULT - PROBLEM SELECTOR PLAN 1
-no fevers recorded since friday evening.   CT chest s/o Left lower lobe pneumonia.  no leucocytosis.  will continue IV rocephin and IV zithromax with iV vancomycin.  legionella negative  blood cultures negative till date  f/u mycoplasma Ag  - c/w  bronchodilators, supp o2 prn  pulmonology consult Dr Ramirez -no fevers recorded since friday evening.   CT chest s/o Left lower lobe pneumonia.  no leucocytosis.  will continue IV rocephin and IV zithromax with iV vancomycin.  legionella negative  blood cultures negative till date  f/u mycoplasma Ag  - c/w  bronchodilators, supp o2 prn  pulmonology consult Dr Ramirez  will f/u ID recommendations and plan for discharge today.

## 2018-07-24 NOTE — DISCHARGE NOTE ADULT - CARE PROVIDER_API CALL
Baljit Ramirez), Medicine  Dept Director  [address] [place] [address]  Phone: [phone]  Fax: [phone]

## 2018-07-24 NOTE — PROGRESS NOTE ADULT - SUBJECTIVE AND OBJECTIVE BOX
Time of Visit:  Patient seen and examined.     MEDICATIONS  (STANDING):  ALBUTerol    90 MICROgram(s) HFA Inhaler 1 Puff(s) Inhalation every 4 hours  atorvastatin 20 milliGRAM(s) Oral at bedtime  azithromycin   Tablet 250 milliGRAM(s) Oral daily  cefTRIAXone   IVPB 1 Gram(s) IV Intermittent every 24 hours  enoxaparin Injectable 40 milliGRAM(s) SubCutaneous daily  levothyroxine 50 MICROGram(s) Oral daily  pantoprazole    Tablet 40 milliGRAM(s) Oral before breakfast  predniSONE   Tablet 20 milliGRAM(s) Oral daily  tiotropium 18 MICROgram(s) Capsule 1 Capsule(s) Inhalation daily  vancomycin  IVPB 750 milliGRAM(s) IV Intermittent every 24 hours  vancomycin  IVPB          MEDICATIONS  (PRN):  guaiFENesin    Syrup 200 milliGRAM(s) Oral every 6 hours PRN Cough       Medications up to date at time of exam.    ROS; No fever, chills, congestion, SOB. Has non productive cough.    PHYSICAL EXAMINATION:    Vital Signs Last 24 Hrs  T(C): 36.8 (24 Jul 2018 14:37), Max: 37 (23 Jul 2018 20:36)  T(F): 98.3 (24 Jul 2018 14:37), Max: 98.6 (23 Jul 2018 20:36)  HR: 82 (24 Jul 2018 14:37) (62 - 82)  BP: 117/66 (24 Jul 2018 14:37) (109/69 - 118/68)  BP(mean): --  RR: 16 (24 Jul 2018 14:37) (16 - 17)  SpO2: 93% (24 Jul 2018 14:37) (93% - 95%)   (if applicable)    General; Alert and oriented. No acute distress. Able to answer question with no SOB.    HEENT: Normocephalic and atraumatic. No nasal tenderness. Extraocular muscles are intact. Moist mucosa.     NECK: Supple, no palpable adenopathy.    LUNGS: Few mild expiratory wheeze.  No use of accessory muscle.     HEART: S1 S2 Regular rate and no click/ rub.     ABDOMEN: Soft, nontender, and nondistended.  No hepatosplenomegaly is noted. Active bowel sounds.     EXTREMITIES: Without any cyanosis, clubbing, rash, lesions or edema.    NEUROLOGIC: Awake, alert, oriented.     SKIN: Warm and moist. Non diaphoretic.       LABS:                        11.5   7.5   )-----------( 289      ( 24 Jul 2018 07:29 )             36.0     07-24    140  |  106  |  19<H>  ----------------------------<  101<H>  3.5   |  25  |  0.96    Ca    8.8      24 Jul 2018 07:29  Phos  3.6     07-24  Mg     2.4     07-24    RADIOLOGY & ADDITIONAL STUDIES:  CXR:  < from: Xray Chest 1 View AP/PA (07.19.18 @ 19:42) >  PROCEDURE DATE:  07/19/2018          INTERPRETATION:  Clinical information: Short of breath.    Portable semiupright chest    Compared with April 12, 2018    Impression: There is mild patchy left retrocardiac opacity which could be   atelectasis or consolidation. Consider follow-up frontal and lateral   chest views. There is no pleural effusion. The cardiomediastinal   silhouette is normal.    Ct chest:  < from: CT Chest No Cont (07.23.18 @ 11:39) >    INTERPRETATION:  CLINICAL INFORMATION: Fever, persistent cough.    COMPARISON: Chest radiograph 7/19/2018, CT cervical spine 6/11/2018.    PROCEDURE:   CT of the Chest was performed without intravenous contrast.  Sagittal and coronal reformats were performed.      FINDINGS:    CHEST:     LUNGS AND LARGE AIRWAYS: Patent central airways.  Patchy left lower lobe   opacity with air bronchograms. Biapical scarring. Bibasilar subsegmental   atelectasis.  PLEURA: Small left pleural effusion.  VESSELS: Atherosclerotic calcifications. Enlarged ascending thoracic   aorta measuring 4.2 cm.  HEART: Heart size is normal. No pericardial effusion. Coronary   calcifications.  MEDIASTINUM AND REANNA: No lymphadenopathy.  CHEST WALL AND LOWER NECK: Within normal limits.  VISUALIZED UPPER ABDOMEN: Within normal limits.  BONES: Degenerative changes and scoliosis.    IMPRESSION:   Left lower lobe pneumonia.    Follow-up CT chest in 6-8 weeks following treatment and clinical   resolution may be obtained to document resolution as clinically   warranted.      PAST MEDICAL & SURGICAL HISTORY:  E-coli UTI  Chronic bronchitis  Dyslipidemia  Pneumonia  Hypothyroid  S/P appendectomy    Impression; 77 Y/O Female with prior mentioned multiple chronic conditions. Presented with fatigue, non productive cough, chills, fever temp 101 x 1 Day. CXR showed Left patchy opacity. CT chest showed with LLL PNA. She also had sick contact with sick children at home, significant second hand smoke exposure. Pat has HCAP since she was recently hospitalized at [facility] for pyelonephritis. RVP, BLD Cx, Legionella Urine negative.     Suggestion;  Oxygen supplementation if needed. O2 saturation 98% room air.  Continue Albuterol 1 puff Daily. Tiotropium. Oral hygiene care every after inhaler used.   Continue Prednisone 20 mg Daily.   DVT/ GI prophylactic. Time of Visit:  Patient seen and examined.     MEDICATIONS  (STANDING):  ALBUTerol    90 MICROgram(s) HFA Inhaler 1 Puff(s) Inhalation every 4 hours  atorvastatin 20 milliGRAM(s) Oral at bedtime  azithromycin   Tablet 250 milliGRAM(s) Oral daily  cefTRIAXone   IVPB 1 Gram(s) IV Intermittent every 24 hours  enoxaparin Injectable 40 milliGRAM(s) SubCutaneous daily  levothyroxine 50 MICROGram(s) Oral daily  pantoprazole    Tablet 40 milliGRAM(s) Oral before breakfast  predniSONE   Tablet 20 milliGRAM(s) Oral daily  tiotropium 18 MICROgram(s) Capsule 1 Capsule(s) Inhalation daily  vancomycin  IVPB 750 milliGRAM(s) IV Intermittent every 24 hours  vancomycin  IVPB          MEDICATIONS  (PRN):  guaiFENesin    Syrup 200 milliGRAM(s) Oral every 6 hours PRN Cough       Medications up to date at time of exam.    ROS; No fever, chills, congestion, SOB. Has non productive cough.    PHYSICAL EXAMINATION:    Vital Signs Last 24 Hrs  T(C): 36.8 (24 Jul 2018 14:37), Max: 37 (23 Jul 2018 20:36)  T(F): 98.3 (24 Jul 2018 14:37), Max: 98.6 (23 Jul 2018 20:36)  HR: 82 (24 Jul 2018 14:37) (62 - 82)  BP: 117/66 (24 Jul 2018 14:37) (109/69 - 118/68)  BP(mean): --  RR: 16 (24 Jul 2018 14:37) (16 - 17)  SpO2: 93% (24 Jul 2018 14:37) (93% - 95%)   (if applicable)    General; Alert and oriented. No acute distress. Able to answer question with no SOB.    HEENT: Normocephalic and atraumatic. No nasal tenderness. Extraocular muscles are intact. Moist mucosa.     NECK: Supple, no palpable adenopathy.    LUNGS: Few mild expiratory wheeze.  No use of accessory muscle.     HEART: S1 S2 Regular rate and no click/ rub.     ABDOMEN: Soft, nontender, and nondistended.  No hepatosplenomegaly is noted. Active bowel sounds.     EXTREMITIES: Without any cyanosis, clubbing, rash, lesions or edema.    NEUROLOGIC: Awake, alert, oriented.     SKIN: Warm and moist. Non diaphoretic.       LABS:                        11.5   7.5   )-----------( 289      ( 24 Jul 2018 07:29 )             36.0     07-24    140  |  106  |  19<H>  ----------------------------<  101<H>  3.5   |  25  |  0.96    Ca    8.8      24 Jul 2018 07:29  Phos  3.6     07-24  Mg     2.4     07-24    RADIOLOGY & ADDITIONAL STUDIES:  CXR:  < from: Xray Chest 1 View AP/PA (07.19.18 @ 19:42) >  PROCEDURE DATE:  07/19/2018          INTERPRETATION:  Clinical information: Short of breath.    Portable semiupright chest    Compared with April 12, 2018    Impression: There is mild patchy left retrocardiac opacity which could be   atelectasis or consolidation. Consider follow-up frontal and lateral   chest views. There is no pleural effusion. The cardiomediastinal   silhouette is normal.    Ct chest:  < from: CT Chest No Cont (07.23.18 @ 11:39) >    INTERPRETATION:  CLINICAL INFORMATION: Fever, persistent cough.    COMPARISON: Chest radiograph 7/19/2018, CT cervical spine 6/11/2018.    PROCEDURE:   CT of the Chest was performed without intravenous contrast.  Sagittal and coronal reformats were performed.      FINDINGS:    CHEST:     LUNGS AND LARGE AIRWAYS: Patent central airways.  Patchy left lower lobe   opacity with air bronchograms. Biapical scarring. Bibasilar subsegmental   atelectasis.  PLEURA: Small left pleural effusion.  VESSELS: Atherosclerotic calcifications. Enlarged ascending thoracic   aorta measuring 4.2 cm.  HEART: Heart size is normal. No pericardial effusion. Coronary   calcifications.  MEDIASTINUM AND REANNA: No lymphadenopathy.  CHEST WALL AND LOWER NECK: Within normal limits.  VISUALIZED UPPER ABDOMEN: Within normal limits.  BONES: Degenerative changes and scoliosis.    IMPRESSION:   Left lower lobe pneumonia.    Follow-up CT chest in 6-8 weeks following treatment and clinical   resolution may be obtained to document resolution as clinically   warranted.      PAST MEDICAL & SURGICAL HISTORY:  E-coli UTI  Chronic bronchitis  Dyslipidemia  Pneumonia  Hypothyroid  S/P appendectomy    Impression; 79 Y/O Female with prior mentioned multiple chronic conditions. Presented with fatigue, non productive cough, chills, fever temp 101 x 1 Day. CXR showed Left patchy opacity. CT chest showed with LLL PNA. She also had sick contact with sick children at home, significant second hand smoke exposure. Pat has HCAP [facility] RVP, BLD Cx, Legionella Urine negative.     Suggestion;  Oxygen supplementation if needed. O2 saturation 98% room air.  Continue Albuterol 1 puff Daily. Tiotropium. Oral hygiene care every after inhaler used.   Continue Prednisone 20 mg Daily.   DVT/ GI prophylactic.  continue antibx as out pat as per ID  out pat pul f/u

## 2018-07-24 NOTE — PROGRESS NOTE ADULT - PROBLEM SELECTOR PLAN 3
c/w atorvastatin 20 mg daily home dose

## 2018-07-24 NOTE — PROGRESS NOTE ADULT - SUBJECTIVE AND OBJECTIVE BOX
Subjective: tells me feels better . decreased cough and sob . no fevers       PHYSICAL EXAM:    Vital Signs Last 24 Hrs  T(C): 36.8 (24 Jul 2018 14:37), Max: 37 (23 Jul 2018 20:36)  T(F): 98.3 (24 Jul 2018 14:37), Max: 98.6 (23 Jul 2018 20:36)  HR: 82 (24 Jul 2018 14:37) (62 - 82)  BP: 117/66 (24 Jul 2018 14:37) (109/69 - 118/68)  BP(mean): --  RR: 16 (24 Jul 2018 14:37) (16 - 17)  SpO2: 93% (24 Jul 2018 14:37) (93% - 95%)    Constitutional: awake alert oriented times 3   STEPHANY SCLERA anicteric EOMI   LUNGS few rales right side   CVS s1 s2 aud no murmurs   ABDOMEN soft non tender no HSM   NEUROLOGY  no defecits   SKIN no rash         LABS/DIAGNOSTIC TESTS                        11.5   7.5   )-----------( 289      ( 24 Jul 2018 07:29 )             36.0     07-24    140  |  106  |  19<H>  ----------------------------<  101<H>  3.5   |  25  |  0.96    Ca    8.8      24 Jul 2018 07:29  Phos  3.6     07-24  Mg     2.4     07-24      Procalcitonin, Serum: 0.05: This assay is intended for use to determine the change of PCT over time  as an aid in assessing the cumulative 28-day risk of all-cause mortality  for patients diagnosed with severe sepsis or septic shock in the ICU, or  when obtained in the emergency department or other medical wards prior to  ICU admission. This assay was performed by the [employer] PCT  assay. ng/mL (07.20.18 @ 17:09)          meds   ALBUTerol    90 MICROgram(s) HFA Inhaler 1 Puff(s) Inhalation every 4 hours  atorvastatin 20 milliGRAM(s) Oral at bedtime  azithromycin   Tablet 250 milliGRAM(s) Oral daily  cefTRIAXone   IVPB 1 Gram(s) IV Intermittent every 24 hours  enoxaparin Injectable 40 milliGRAM(s) SubCutaneous daily  guaiFENesin    Syrup 200 milliGRAM(s) Oral every 6 hours PRN  levothyroxine 50 MICROGram(s) Oral daily  pantoprazole    Tablet 40 milliGRAM(s) Oral before breakfast  predniSONE   Tablet 20 milliGRAM(s) Oral daily  tiotropium 18 MICROgram(s) Capsule 1 Capsule(s) Inhalation daily  vancomycin  IVPB 750 milliGRAM(s) IV Intermittent every 24 hours  vancomycin  IVPB            CULTURES  Culture Results: urine   <10,000 CFU/ml  Normal Urogenital rhonda present (07-19 @ 23:20)  Culture Results: blood  No growth to date. (07-19 @ 23:08)  Culture Results: blood  No growth to date. (07-19 @ 23:08)        RADIOLOGY  < from: CT Chest No Cont (07.23.18 @ 11:39) >    EXAM:  CT CHEST                            PROCEDURE DATE:  07/23/2018          INTERPRETATION:  CLINICAL INFORMATION: Fever, persistent cough.    COMPARISON: Chest radiograph 7/19/2018, CT cervical spine 6/11/2018.    PROCEDURE:   CT of the Chest was performed without intravenous contrast.  Sagittal and coronal reformats were performed.      FINDINGS:    CHEST:     LUNGS AND LARGE AIRWAYS: Patent central airways.  Patchy left lower lobe   opacity with air bronchograms. Biapical scarring. Bibasilar subsegmental   atelectasis.  PLEURA: Small left pleural effusion.  VESSELS: Atherosclerotic calcifications. Enlarged ascending thoracic   aorta measuring 4.2 cm.  HEART: Heart size is normal. No pericardial effusion. Coronary   calcifications.  MEDIASTINUM AND REANNA: No lymphadenopathy.  CHEST WALL AND LOWER NECK: Within normal limits.  VISUALIZED UPPER ABDOMEN: Within normal limits.  BONES: Degenerative changes and scoliosis.    IMPRESSION:   Left lower lobe pneumonia.    Follow-up CT chest in 6-8 weeks following treatment and clinical   resolution may be obtained to document resolution as clinically     < end of copied text >

## 2018-07-24 NOTE — DISCHARGE NOTE ADULT - PLAN OF CARE
resolution of infection we found that you had left sided pneumonia and was started on IV antibiotics, your symptoms improved , hence recommended to continue XXXXX control of symptoms advise to continue cough syrup as needed and continue duoneb inhalation. advise to follow up with PCP/ pulmonologist within a week of discharge. control of TSH within normal range advise to continue home dose of synthroid and please follow up with PCP with in 1 week of discharge. we found that you had left sided pneumonia and was started on IV antibiotics, your symptoms improved , hence recommended to continue ceftin and doxycycline for another 4 days.

## 2018-07-24 NOTE — DISCHARGE NOTE ADULT - MEDICATION SUMMARY - MEDICATIONS TO TAKE
I will START or STAY ON the medications listed below when I get home from the hospital:    predniSONE 20 mg oral tablet  -- 1 tab(s) by mouth once a day  -- Indication: For bronchitis    atorvastatin 20 mg oral tablet  -- 1 tab(s) by mouth once a day  -- Indication: For HLD (hyperlipidemia)    doxycycline hyclate 100 mg oral capsule  -- 1 cap(s) by mouth 2 times a day   -- Avoid prolonged or excessive exposure to direct and/or artificial sunlight while taking this medication.  Do not take this drug if you are pregnant.  Finish all this medication unless otherwise directed by prescriber.  Medication should be taken with plenty of water.    -- Indication: For Pneumonia    albuterol 90 mcg/inh inhalation aerosol  -- 1 puff(s) inhaled every 4 hours  -- Indication: For bronchitis    Ceftin 250 mg oral tablet  -- 1 tab(s) by mouth 2 times a day   -- Finish all this medication unless otherwise directed by prescriber.  Medication should be taken with plenty of water.  Take with food or milk.    -- Indication: For Pneumonia    guaiFENesin 100 mg/5 mL oral liquid  -- 10 milliliter(s) by mouth every 8 hours, As Needed -Cough   -- Indication: For bronchitis    azelastine nasal  -- Indication: For nasal congestion    azelastine 137 mcg/inh (0.1%) nasal spray  -- 2 spray(s) into nose 2 times a day  -- Indication: For nasal congestion    pantoprazole 40 mg oral delayed release tablet  -- 1 tab(s) by mouth once a day (before a meal)  -- Indication: For gastritis    Qvar 80 mcg/inh inhalation aerosol  -- 1 puff(s) inhaled 2 times a day  -- Indication: For bronchitis    fluticasone 50 mcg inhalation powder  -- 1 puff(s) inhaled 2 times a day  -- Indication: For bronchitis    Synthroid 50 mcg (0.05 mg) oral tablet  -- 1 tab(s) by mouth once a day  -- Indication: For Hypothyroid

## 2018-07-24 NOTE — DISCHARGE NOTE ADULT - PATIENT PORTAL LINK FT
You can access the [facility] Portal, offered by [facility], by registering with the following website: [email or web address]

## 2018-07-24 NOTE — PROGRESS NOTE ADULT - PROVIDER SPECIALTY LIST ADULT
Infectious Disease
Internal Medicine
Pulmonology
Internal Medicine

## 2018-07-24 NOTE — DISCHARGE NOTE ADULT - HOSPITAL COURSE
a 77 years old female from home, walks in [facility], lives with , with PMH of chronic bronchitis x 2 years, hypothyroidism, hyperlipidemia, E-coli bactremia, bronchial pneumonia presented to ED c/o fatigue, non productive cough, chills and fever 101 for x 1 day. Patient has history of sick grand children at home. Patient was discharged from [facility] in April after being treated for Pyelonephritis secondary to E-coli. Denied chest pain, abdominal pain, leg swelling, recent travel, diarrhea ,rhinorrhea, or any other symptoms. ED course: In ED, pt had fever of 101.8 F rectal temp, HR 94, /57, s/p 1 dose Azithromycin and ceftriaxone- s/p 1L NS bolus in ED. Labs significant for WBC of 11.8K with left shift, UA negative,  RVP; negative, CXR has questionable right LL infiltrate with left retrocardiac opacity, was admitted to the medicine floor for management of pneumonia. patient was started on rocephin and zithromax , blood cultures came back negative. patient was also started on nebulizations and oxygen supplementation as needed. Patient was still spiking fevers, pulmonology Dr Ramirez was consulted who recommended to start on short course of prednisone. and ID Dr. Vernon recommended to start vancomycin IV with rocephin and zithromax, after which fevers subsided. Patient had urine legionella which was negative and CT chest s/o Left lower lobe pneumonia. a 77 years old female from home, walks in [facility], lives with , with PMH of chronic bronchitis x 2 years, hypothyroidism, hyperlipidemia, E-coli bactremia, bronchial pneumonia presented to ED c/o fatigue, non productive cough, chills and fever 101 for x 1 day. Patient has history of sick grand children at home. Patient was discharged from [facility] in April after being treated for Pyelonephritis secondary to E-coli. Denied chest pain, abdominal pain, leg swelling, recent travel, diarrhea ,rhinorrhea, or any other symptoms. ED course: In ED, pt had fever of 101.8 F rectal temp, HR 94, /57, s/p 1 dose Azithromycin and ceftriaxone- s/p 1L NS bolus in ED. Labs significant for WBC of 11.8K with left shift, UA negative,  RVP; negative, CXR has questionable right LL infiltrate with left retrocardiac opacity, was admitted to the medicine floor for management of pneumonia. patient was started on rocephin and zithromax , blood cultures came back negative. patient was also started on nebulizations and oxygen supplementation as needed. Patient was still spiking fevers, pulmonology Dr Ramirez was consulted who recommended to start on short course of prednisone. and ID Dr. Vernon recommended to start vancomycin IV with rocephin and zithromax, after which fevers subsided. Patient had urine legionella which was negative and CT chest s/o Left lower lobe pneumonia. Patient clinically improved, no fevers for more than 72 hours. ID recommended to continue XXXXX  advise to follow up with PCP and pulmonology  within a week of discharge.   Please refer to patient's complete medical chart with documents for a full hospital course, for this is only a brief summary. a 77 years old female from home, walks in [facility], lives with , with PMH of chronic bronchitis x 2 years, hypothyroidism, hyperlipidemia, E-coli bactremia, bronchial pneumonia presented to ED c/o fatigue, non productive cough, chills and fever 101 for x 1 day. Patient has history of sick grand children at home. Patient was discharged from [facility] in April after being treated for Pyelonephritis secondary to E-coli. Denied chest pain, abdominal pain, leg swelling, recent travel, diarrhea ,rhinorrhea, or any other symptoms. ED course: In ED, pt had fever of 101.8 F rectal temp, HR 94, /57, s/p 1 dose Azithromycin and ceftriaxone- s/p 1L NS bolus in ED. Labs significant for WBC of 11.8K with left shift, UA negative,  RVP; negative, CXR has questionable right LL infiltrate with left retrocardiac opacity, was admitted to the medicine floor for management of pneumonia. patient was started on rocephin and zithromax , blood cultures came back negative. patient was also started on nebulizations and oxygen supplementation as needed. Patient was still spiking fevers, pulmonology Dr Ramirez was consulted who recommended to start on short course of prednisone. and ID Dr. Vernon recommended to start vancomycin IV with rocephin and zithromax, after which fevers subsided. Patient had urine legionella which was negative and CT chest s/o Left lower lobe pneumonia. Patient clinically improved, no fevers for more than 72 hours. ID recommended to continue ceftin and doxycycline for another 4 days.  advise to follow up with PCP and pulmonology  within a week of discharge.   Please refer to patient's complete medical chart with documents for a full hospital course, for this is only a brief summary. a 78 year old female from home, walks in [facility], lives with , with PMH of chronic bronchitis x 2 years, hypothyroidism, hyperlipidemia, E-coli bactremia, bronchial pneumonia presented to ED c/o fatigue, non productive cough, chills and fever 101 for x 1 day. Patient has history of sick grand children at home. Patient was discharged from [facility] in April after being treated for Pyelonephritis secondary to E-coli. Denied chest pain, abdominal pain, leg swelling, recent travel, diarrhea ,rhinorrhea, or any other symptoms. ED course: In ED, pt had fever of 101.8 F rectal temp, HR 94, /57, s/p 1 dose Azithromycin and ceftriaxone- s/p 1L NS bolus in ED. Labs significant for WBC of 11.8K with left shift, UA negative,  RVP; negative, CXR has questionable right LL infiltrate with left retrocardiac opacity, was admitted to the medicine floor for management of pneumonia. patient was started on rocephin and zithromax , blood cultures came back negative. patient was also started on nebulizations and oxygen supplementation as needed. Patient was still spiking fevers, pulmonology Dr Ramirez was consulted who recommended to start on short course of prednisone. and ID Dr. Vernon recommended to start vancomycin IV with rocephin and zithromax, after which fevers subsided. Patient had urine legionella which was negative and CT chest s/o Left lower lobe pneumonia. Patient clinically improved, no fevers for more than 72 hours. ID recommended to continue ceftin and doxycycline for another 4 days.  advise to follow up with PCP and pulmonology  within a week of discharge.   Please refer to patient's complete medical chart with documents for a full hospital course, for this is only a brief summary.

## 2018-07-24 NOTE — PROGRESS NOTE ADULT - ASSESSMENT
Patient is a 77 years old female from home, walks in [facility], lives with , with PMH of chronic bronchitis x 2 years, has been extensively w/u by her pulmonologist in [facility] with no real dxsis ,   hypothyroidism, hyperlipidemia, E-coli bactremia in april 2018 , bronchial pneumonia presented to ED c/o fatigue,  cough productive of green to white phlegm , sob not responding to qvar and nebs,  chills and fever 101 for x 1 day. Patient has history of sick grand children at home. Patient was discharged from [facility] in April after being treated for Pyelonephritis secondary to E-coli. Denies chest pain, abdominal pain, leg swelling, recent travel, diarrhea ,rhinorrhea, or vomiting . has taken the flu and pneumococcal vaccine   In ED, pt had fever of 101.8 F with rectal temp, HR 94, /57, s/p 1 dose Azithromycin and ceftriaxone- s/p 1L NS bolus in ED. Labs significant for WBC of 11.8K with left shift, UA negative,  RVP; negative, CXR has questionable right LL infiltrate. Is admitted to the medicine floor CAP    # likely CAP r/o asp pna . hx of chronic bronchitis for 2 yrs as per pt , improving as per pt . RVP swab neg . ct chest with left sided pna . urine legionella ag neg , low pct     PLAN   can change to oral doxy 100mg po bid  and ceftin 250mg po bid for 5 days more   f/u blood cx times 2       stable for d/c from id pt of view   d/w resident

## 2018-07-24 NOTE — PROGRESS NOTE ADULT - SUBJECTIVE AND OBJECTIVE BOX
Resident Note discussed with  primary attending    Patient is a 78y old  Female who presents with a chief complaint of cough fever (24 Jul 2018 05:58)      INTERVAL HPI/OVERNIGHT EVENTS: no new complaints overnight. Patient c/o cough- more with deep breaths but better than yesterday associated with chest tightness especially with deep breaths    MEDICATIONS  (STANDING):  ALBUTerol    90 MICROgram(s) HFA Inhaler 1 Puff(s) Inhalation every 4 hours  atorvastatin 20 milliGRAM(s) Oral at bedtime  azithromycin   Tablet 250 milliGRAM(s) Oral daily  cefTRIAXone   IVPB 1 Gram(s) IV Intermittent every 24 hours  enoxaparin Injectable 40 milliGRAM(s) SubCutaneous daily  levothyroxine 50 MICROGram(s) Oral daily  pantoprazole    Tablet 40 milliGRAM(s) Oral before breakfast  predniSONE   Tablet 20 milliGRAM(s) Oral daily  tiotropium 18 MICROgram(s) Capsule 1 Capsule(s) Inhalation daily  vancomycin  IVPB 750 milliGRAM(s) IV Intermittent every 24 hours  vancomycin  IVPB        MEDICATIONS  (PRN):  guaiFENesin    Syrup 200 milliGRAM(s) Oral every 6 hours PRN Cough      __________________________________________________  REVIEW OF SYSTEMS:    CONSTITUTIONAL: No fever,   EYES: no acute visual disturbances  NECK: No pain or stiffness  RESPIRATORY: cough+; No shortness of breath  CARDIOVASCULAR: chest tightness with deep breaths, no palpitations  GASTROINTESTINAL: No pain. No nausea or vomiting; No diarrhea   NEUROLOGICAL: No headache or numbness, no tremors  MUSCULOSKELETAL: No joint pain, no muscle pain  GENITOURINARY: no dysuria, no frequency, no hesitancy  PSYCHIATRY: no depression , no anxiety  ALL OTHER  ROS negative        Vital Signs Last 24 Hrs  T(C): 36.6 (24 Jul 2018 05:34), Max: 37 (23 Jul 2018 13:59)  T(F): 97.8 (24 Jul 2018 05:34), Max: 98.6 (23 Jul 2018 13:59)  HR: 62 (24 Jul 2018 05:34) (62 - 80)  BP: 118/68 (24 Jul 2018 05:34) (109/69 - 119/59)  BP(mean): --  RR: 16 (24 Jul 2018 05:34) (16 - 17)  SpO2: 95% (24 Jul 2018 05:34) (95% - 96%)    ________________________________________________  PHYSICAL EXAM:  GENERAL: patient lying comfortably in bed, not in distress.  HEENT: Normocephalic; conjunctivae and sclerae clear; moist mucous membranes.  NECK : supple.  CHEST/LUNG: bilateral good air entry, bilateral expiratory wheeze noted  HEART: S1 S2  regular; no murmurs  ABDOMEN: Soft, Nontender, Nondistended; Bowel sounds present  EXTREMITIES: no cyanosis; no edema; no calf tenderness  NERVOUS SYSTEM:  Awake and alert; Oriented  to place, person and time ; no new deficits    _________________________________________________  LABS:                        11.5   7.5   )-----------( 289      ( 24 Jul 2018 07:29 )             36.0     07-24    140  |  106  |  19<H>  ----------------------------<  101<H>  3.5   |  25  |  0.96    Ca    8.8      24 Jul 2018 07:29  Phos  3.6     07-24  Mg     2.4     07-24          CAPILLARY BLOOD GLUCOSE            RADIOLOGY & ADDITIONAL TESTS: < from: CT Chest No Cont (07.23.18 @ 11:39) >  IMPRESSION:   Left lower lobe pneumonia.    Follow-up CT chest in 6-8 weeks following treatment and clinical   resolution may be obtained to document resolution as clinically   warranted.     < end of copied text >      Imaging Personally Reviewed:  YES    Consultant(s) Notes Reviewed:   YES    Care Discussed with Consultants : YES    Plan of care was discussed with patient and /or primary care giver; all questions and concerns were addressed and care was aligned with patient's wishes. Resident Note discussed with  primary attending    Patient is a 78y old  Female who presents with a chief complaint of cough fever (24 Jul 2018 05:58)      INTERVAL HPI/OVERNIGHT EVENTS: no new complaints overnight. Patient c/o cough- more with deep breaths but better than yesterday     MEDICATIONS  (STANDING):  ALBUTerol    90 MICROgram(s) HFA Inhaler 1 Puff(s) Inhalation every 4 hours  atorvastatin 20 milliGRAM(s) Oral at bedtime  azithromycin   Tablet 250 milliGRAM(s) Oral daily  cefTRIAXone   IVPB 1 Gram(s) IV Intermittent every 24 hours  enoxaparin Injectable 40 milliGRAM(s) SubCutaneous daily  levothyroxine 50 MICROGram(s) Oral daily  pantoprazole    Tablet 40 milliGRAM(s) Oral before breakfast  predniSONE   Tablet 20 milliGRAM(s) Oral daily  tiotropium 18 MICROgram(s) Capsule 1 Capsule(s) Inhalation daily  vancomycin  IVPB 750 milliGRAM(s) IV Intermittent every 24 hours  vancomycin  IVPB        MEDICATIONS  (PRN):  guaiFENesin    Syrup 200 milliGRAM(s) Oral every 6 hours PRN Cough      __________________________________________________  REVIEW OF SYSTEMS:    CONSTITUTIONAL: No fever,   EYES: no acute visual disturbances  NECK: No pain or stiffness  RESPIRATORY: cough+; No shortness of breath  CARDIOVASCULAR: mild chest tightness with deep breaths, no palpitations  GASTROINTESTINAL: No pain. No nausea or vomiting; No diarrhea   NEUROLOGICAL: No headache or numbness, no tremors  MUSCULOSKELETAL: No joint pain, no muscle pain  GENITOURINARY: no dysuria, no frequency, no hesitancy  PSYCHIATRY: no depression , no anxiety  ALL OTHER  ROS negative        Vital Signs Last 24 Hrs  T(C): 36.6 (24 Jul 2018 05:34), Max: 37 (23 Jul 2018 13:59)  T(F): 97.8 (24 Jul 2018 05:34), Max: 98.6 (23 Jul 2018 13:59)  HR: 62 (24 Jul 2018 05:34) (62 - 80)  BP: 118/68 (24 Jul 2018 05:34) (109/69 - 119/59)  BP(mean): --  RR: 16 (24 Jul 2018 05:34) (16 - 17)  SpO2: 95% (24 Jul 2018 05:34) (95% - 96%)    ________________________________________________  PHYSICAL EXAM:  GENERAL: patient lying comfortably in bed, not in distress.  HEENT: Normocephalic; conjunctivae and sclerae clear; moist mucous membranes.  NECK : supple.  CHEST/LUNG: bilateral good air entry, bilateral expiratory wheeze noted  HEART: S1 S2  regular; no murmurs  ABDOMEN: Soft, Nontender, Nondistended; Bowel sounds present  EXTREMITIES: no cyanosis; no edema; no calf tenderness  NERVOUS SYSTEM:  Awake and alert; Oriented  to place, person and time ; no new deficits    _________________________________________________  LABS:                        11.5   7.5   )-----------( 289      ( 24 Jul 2018 07:29 )             36.0     07-24    140  |  106  |  19<H>  ----------------------------<  101<H>  3.5   |  25  |  0.96    Ca    8.8      24 Jul 2018 07:29  Phos  3.6     07-24  Mg     2.4     07-24          CAPILLARY BLOOD GLUCOSE            RADIOLOGY & ADDITIONAL TESTS: < from: CT Chest No Cont (07.23.18 @ 11:39) >  IMPRESSION:   Left lower lobe pneumonia.    Follow-up CT chest in 6-8 weeks following treatment and clinical   resolution may be obtained to document resolution as clinically   warranted.     < end of copied text >      Imaging Personally Reviewed:  YES    Consultant(s) Notes Reviewed:   YES    Care Discussed with Consultants : YES    Plan of care was discussed with patient and /or primary care giver; all questions and concerns were addressed and care was aligned with patient's wishes.

## 2018-12-21 ENCOUNTER — OUTPATIENT (OUTPATIENT)
Dept: OUTPATIENT SERVICES | Facility: HOSPITAL | Age: 78
LOS: 1 days | End: 2018-12-21
Payer: MEDICARE

## 2018-12-21 DIAGNOSIS — Z90.49 ACQUIRED ABSENCE OF OTHER SPECIFIED PARTS OF DIGESTIVE TRACT: Chronic | ICD-10-CM

## 2018-12-21 DIAGNOSIS — J45.30 MILD PERSISTENT ASTHMA, UNCOMPLICATED: ICD-10-CM

## 2018-12-21 PROBLEM — N39.0 URINARY TRACT INFECTION, SITE NOT SPECIFIED: Chronic | Status: ACTIVE | Noted: 2018-07-19

## 2018-12-21 PROBLEM — J42 UNSPECIFIED CHRONIC BRONCHITIS: Chronic | Status: ACTIVE | Noted: 2018-07-19

## 2018-12-21 PROCEDURE — 94060 EVALUATION OF WHEEZING: CPT

## 2018-12-21 PROCEDURE — 94010 BREATHING CAPACITY TEST: CPT

## 2018-12-21 PROCEDURE — 94729 DIFFUSING CAPACITY: CPT

## 2019-02-28 ENCOUNTER — EMERGENCY (EMERGENCY)
Facility: HOSPITAL | Age: 79
LOS: 1 days | Discharge: HOME | End: 2019-02-28
Attending: EMERGENCY MEDICINE
Payer: MEDICARE

## 2019-02-28 VITALS
OXYGEN SATURATION: 98 % | DIASTOLIC BLOOD PRESSURE: 82 MMHG | SYSTOLIC BLOOD PRESSURE: 147 MMHG | WEIGHT: 123.9 LBS | RESPIRATION RATE: 20 BRPM | TEMPERATURE: 98 F | HEART RATE: 93 BPM | HEIGHT: 68 IN

## 2019-02-28 VITALS — TEMPERATURE: 100 F

## 2019-02-28 DIAGNOSIS — Z90.49 ACQUIRED ABSENCE OF OTHER SPECIFIED PARTS OF DIGESTIVE TRACT: Chronic | ICD-10-CM

## 2019-02-28 LAB
ALBUMIN SERPL ELPH-MCNC: 4 G/DL — SIGNIFICANT CHANGE UP (ref 3.5–5)
ALP SERPL-CCNC: 89 U/L — SIGNIFICANT CHANGE UP (ref 40–120)
ALT FLD-CCNC: 40 U/L DA — SIGNIFICANT CHANGE UP (ref 10–60)
ANION GAP SERPL CALC-SCNC: 9 MMOL/L — SIGNIFICANT CHANGE UP (ref 5–17)
APPEARANCE UR: CLEAR — SIGNIFICANT CHANGE UP
AST SERPL-CCNC: 32 U/L — SIGNIFICANT CHANGE UP (ref 10–40)
BASOPHILS # BLD AUTO: 0.02 K/UL — SIGNIFICANT CHANGE UP (ref 0–0.2)
BASOPHILS NFR BLD AUTO: 0.2 % — SIGNIFICANT CHANGE UP (ref 0–2)
BILIRUB SERPL-MCNC: 1 MG/DL — SIGNIFICANT CHANGE UP (ref 0.2–1.2)
BILIRUB UR-MCNC: NEGATIVE — SIGNIFICANT CHANGE UP
BUN SERPL-MCNC: 20 MG/DL — HIGH (ref 7–18)
CALCIUM SERPL-MCNC: 8.7 MG/DL — SIGNIFICANT CHANGE UP (ref 8.4–10.5)
CHLORIDE SERPL-SCNC: 101 MMOL/L — SIGNIFICANT CHANGE UP (ref 96–108)
CK SERPL-CCNC: 225 U/L — HIGH (ref 21–215)
CO2 SERPL-SCNC: 24 MMOL/L — SIGNIFICANT CHANGE UP (ref 22–31)
COLOR SPEC: YELLOW — SIGNIFICANT CHANGE UP
CREAT SERPL-MCNC: 0.91 MG/DL — SIGNIFICANT CHANGE UP (ref 0.5–1.3)
DIFF PNL FLD: ABNORMAL
EOSINOPHIL # BLD AUTO: 0.12 K/UL — SIGNIFICANT CHANGE UP (ref 0–0.5)
EOSINOPHIL NFR BLD AUTO: 1 % — SIGNIFICANT CHANGE UP (ref 0–6)
FLU A RESULT: SIGNIFICANT CHANGE UP
FLU A RESULT: SIGNIFICANT CHANGE UP
FLUAV AG NPH QL: SIGNIFICANT CHANGE UP
FLUBV AG NPH QL: SIGNIFICANT CHANGE UP
GLUCOSE SERPL-MCNC: 103 MG/DL — HIGH (ref 70–99)
GLUCOSE UR QL: NEGATIVE — SIGNIFICANT CHANGE UP
HCT VFR BLD CALC: 42.5 % — SIGNIFICANT CHANGE UP (ref 34.5–45)
HGB BLD-MCNC: 13.7 G/DL — SIGNIFICANT CHANGE UP (ref 11.5–15.5)
IMM GRANULOCYTES NFR BLD AUTO: 0.3 % — SIGNIFICANT CHANGE UP (ref 0–1.5)
KETONES UR-MCNC: NEGATIVE — SIGNIFICANT CHANGE UP
LACTATE SERPL-SCNC: 0.8 MMOL/L — SIGNIFICANT CHANGE UP (ref 0.7–2)
LEUKOCYTE ESTERASE UR-ACNC: NEGATIVE — SIGNIFICANT CHANGE UP
LYMPHOCYTES # BLD AUTO: 0.57 K/UL — LOW (ref 1–3.3)
LYMPHOCYTES # BLD AUTO: 4.9 % — LOW (ref 13–44)
MCHC RBC-ENTMCNC: 31.7 PG — SIGNIFICANT CHANGE UP (ref 27–34)
MCHC RBC-ENTMCNC: 32.2 GM/DL — SIGNIFICANT CHANGE UP (ref 32–36)
MCV RBC AUTO: 98.4 FL — SIGNIFICANT CHANGE UP (ref 80–100)
MONOCYTES # BLD AUTO: 0.66 K/UL — SIGNIFICANT CHANGE UP (ref 0–0.9)
MONOCYTES NFR BLD AUTO: 5.7 % — SIGNIFICANT CHANGE UP (ref 2–14)
NEUTROPHILS # BLD AUTO: 10.15 K/UL — HIGH (ref 1.8–7.4)
NEUTROPHILS NFR BLD AUTO: 87.9 % — HIGH (ref 43–77)
NITRITE UR-MCNC: NEGATIVE — SIGNIFICANT CHANGE UP
NRBC # BLD: 0 /100 WBCS — SIGNIFICANT CHANGE UP (ref 0–0)
PH UR: 6 — SIGNIFICANT CHANGE UP (ref 5–8)
PLATELET # BLD AUTO: 326 K/UL — SIGNIFICANT CHANGE UP (ref 150–400)
POTASSIUM SERPL-MCNC: 3.4 MMOL/L — LOW (ref 3.5–5.3)
POTASSIUM SERPL-SCNC: 3.4 MMOL/L — LOW (ref 3.5–5.3)
PROT SERPL-MCNC: 7.9 G/DL — SIGNIFICANT CHANGE UP (ref 6–8.3)
PROT UR-MCNC: 15
RBC # BLD: 4.32 M/UL — SIGNIFICANT CHANGE UP (ref 3.8–5.2)
RBC # FLD: 13.8 % — SIGNIFICANT CHANGE UP (ref 10.3–14.5)
RSV RESULT: SIGNIFICANT CHANGE UP
RSV RNA RESP QL NAA+PROBE: SIGNIFICANT CHANGE UP
SODIUM SERPL-SCNC: 134 MMOL/L — LOW (ref 135–145)
SP GR SPEC: 1.01 — SIGNIFICANT CHANGE UP (ref 1.01–1.02)
TROPONIN I SERPL-MCNC: <0.015 NG/ML — SIGNIFICANT CHANGE UP (ref 0–0.04)
UROBILINOGEN FLD QL: NEGATIVE — SIGNIFICANT CHANGE UP
WBC # BLD: 11.55 K/UL — HIGH (ref 3.8–10.5)
WBC # FLD AUTO: 11.55 K/UL — HIGH (ref 3.8–10.5)

## 2019-02-28 PROCEDURE — 81001 URINALYSIS AUTO W/SCOPE: CPT

## 2019-02-28 PROCEDURE — 83735 ASSAY OF MAGNESIUM: CPT

## 2019-02-28 PROCEDURE — 87040 BLOOD CULTURE FOR BACTERIA: CPT

## 2019-02-28 PROCEDURE — 71046 X-RAY EXAM CHEST 2 VIEWS: CPT | Mod: 26

## 2019-02-28 PROCEDURE — 96374 THER/PROPH/DIAG INJ IV PUSH: CPT

## 2019-02-28 PROCEDURE — 82009 KETONE BODYS QUAL: CPT

## 2019-02-28 PROCEDURE — 80053 COMPREHEN METABOLIC PANEL: CPT

## 2019-02-28 PROCEDURE — 99284 EMERGENCY DEPT VISIT MOD MDM: CPT | Mod: 25

## 2019-02-28 PROCEDURE — 87086 URINE CULTURE/COLONY COUNT: CPT

## 2019-02-28 PROCEDURE — 71046 X-RAY EXAM CHEST 2 VIEWS: CPT

## 2019-02-28 PROCEDURE — 87631 RESP VIRUS 3-5 TARGETS: CPT

## 2019-02-28 PROCEDURE — 85027 COMPLETE CBC AUTOMATED: CPT

## 2019-02-28 PROCEDURE — 93005 ELECTROCARDIOGRAM TRACING: CPT

## 2019-02-28 PROCEDURE — 99284 EMERGENCY DEPT VISIT MOD MDM: CPT

## 2019-02-28 PROCEDURE — 93010 ELECTROCARDIOGRAM REPORT: CPT

## 2019-02-28 PROCEDURE — 83605 ASSAY OF LACTIC ACID: CPT

## 2019-02-28 PROCEDURE — 84484 ASSAY OF TROPONIN QUANT: CPT

## 2019-02-28 PROCEDURE — 36415 COLL VENOUS BLD VENIPUNCTURE: CPT

## 2019-02-28 PROCEDURE — 82550 ASSAY OF CK (CPK): CPT

## 2019-02-28 RX ORDER — SODIUM CHLORIDE 9 MG/ML
1000 INJECTION INTRAMUSCULAR; INTRAVENOUS; SUBCUTANEOUS
Qty: 0 | Refills: 0 | Status: DISCONTINUED | OUTPATIENT
Start: 2019-02-28 | End: 2019-03-04

## 2019-02-28 RX ORDER — ONDANSETRON 8 MG/1
4 TABLET, FILM COATED ORAL ONCE
Qty: 0 | Refills: 0 | Status: COMPLETED | OUTPATIENT
Start: 2019-02-28 | End: 2019-02-28

## 2019-02-28 RX ORDER — POTASSIUM CHLORIDE 20 MEQ
40 PACKET (EA) ORAL ONCE
Qty: 0 | Refills: 0 | Status: COMPLETED | OUTPATIENT
Start: 2019-02-28 | End: 2019-02-28

## 2019-02-28 RX ADMIN — ONDANSETRON 4 MILLIGRAM(S): 8 TABLET, FILM COATED ORAL at 18:50

## 2019-02-28 RX ADMIN — Medication 40 MILLIEQUIVALENT(S): at 21:59

## 2019-02-28 RX ADMIN — SODIUM CHLORIDE 150 MILLILITER(S): 9 INJECTION INTRAMUSCULAR; INTRAVENOUS; SUBCUTANEOUS at 18:50

## 2019-02-28 NOTE — ED PROVIDER NOTE - PROGRESS NOTE DETAILS
pt feeling better, no infectious process, leukocytosis mostly due to recent Prednisone usage, pt with dehydration, will d/c home.  D/W Dr. Gamez & Dr. Suggs daughter in law

## 2019-02-28 NOTE — ED PROVIDER NOTE - ENMT, MLM
Airway patent, Nasal mucosa clear. Mouth with normal mucosa. Throat has no vesicles, no oropharyngeal exudates and uvula is midline. 4sinus- NT to percussion

## 2019-02-28 NOTE — ED PROVIDER NOTE - CLINICAL SUMMARY MEDICAL DECISION MAKING FREE TEXT BOX
Pt with episodes of sluggishness and weakness; concern for infectious process versus metabolic imbalance. Will get labs, check CXR, and reassess

## 2019-02-28 NOTE — ED PROVIDER NOTE - OBJECTIVE STATEMENT
79 y/o F pt with a PMHx of Chronic Bronchitis, Dyslipidemia, UTI, Hypothyroidism, and PNA, and a PSHx of Appendectomy presents to ED c/o nausea, generalized weakness, and vomiting since this morning. Pt states that this morning she woke up and felt that her mouth was dry but otherwise felt okay. Pt reports that she used an Albuterol nebulizer, Nasal spray, and Advair and shortly afterwards she began to feel unwell. Pt states that she ate breakfast in the morning but has not eaten since. Pt relates that she drank a larger than usual amount of water today but felt that her urine output was not indicative of the amount of water she drank. Pt had a single episode of emesis in the ED. Of note, pt started a Prednisone course 3 weeks ago for wheezing and coughing (but no PNA) and finished it 1 week ago. Pt denies SOB, chest pain, LOC, fevers, cough, painful urination, recent travel, or any other acute complaints. Allergies: Amoxicillin (Rash).

## 2019-02-28 NOTE — ED ADULT NURSE NOTE - NSIMPLEMENTINTERV_GEN_ALL_ED
Implemented All Universal Safety Interventions:  [place] to call system. Call bell, personal items and telephone within reach. Instruct patient to call for assistance. Room bathroom lighting operational. Non-slip footwear when patient is off stretcher. Physically safe environment: no spills, clutter or unnecessary equipment. Stretcher in lowest position, wheels locked, appropriate side rails in place.

## 2019-03-01 LAB
CULTURE RESULTS: NO GROWTH — SIGNIFICANT CHANGE UP
SPECIMEN SOURCE: SIGNIFICANT CHANGE UP

## 2019-09-26 NOTE — PROGRESS NOTE ADULT - PROBLEM SELECTOR PROBLEM 4
Patient: Marlon Bergman    Procedure Summary     Date:  09/26/19 Room / Location:   [facility]    Anesthesia Start:  1255 Anesthesia Stop:      Procedure:  ESOPHAGOGASTRODUODENOSCOPY (N/A ) Diagnosis:       Melena      Symptomatic anemia      (Melena [K92.1])      (Symptomatic anemia [D64.9])    Surgeon:  Brunner, Mark I, MD Provider:  Humza Neri MD    Anesthesia Type:  MAC ASA Status:  3          Anesthesia Type: MAC  Last vitals  /69  107/53 (09/26/19 0736)   Temp 97  97.7 °F (36.5 °C) (09/26/19 0736)   Pulse 79  60 (09/26/19 1057)   Resp 14  16 (09/26/19 1057)     SpO2 97  97 % (09/26/19 1057)     Post Anesthesia Care and Evaluation    Patient location during evaluation: PACU  Patient participation: complete - patient participated  Level of consciousness: awake and alert  Pain score: 0  Pain management: adequate  Airway patency: patent  Anesthetic complications: No anesthetic complications  PONV Status: none  Cardiovascular status: hemodynamically stable and acceptable  Respiratory status: nonlabored ventilation, acceptable and nasal cannula  Hydration status: acceptable      
HLD (hyperlipidemia)

## 2020-02-26 ENCOUNTER — INPATIENT (INPATIENT)
Facility: HOSPITAL | Age: 80
LOS: 3 days | Discharge: HOME | DRG: 194 | End: 2020-03-01
Attending: INTERNAL MEDICINE | Admitting: INTERNAL MEDICINE
Payer: MEDICARE

## 2020-02-26 VITALS
SYSTOLIC BLOOD PRESSURE: 128 MMHG | DIASTOLIC BLOOD PRESSURE: 70 MMHG | HEIGHT: 69 IN | HEART RATE: 94 BPM | OXYGEN SATURATION: 95 % | RESPIRATION RATE: 16 BRPM | WEIGHT: 119.93 LBS | TEMPERATURE: 98 F

## 2020-02-26 DIAGNOSIS — J15.9 UNSPECIFIED BACTERIAL PNEUMONIA: ICD-10-CM

## 2020-02-26 DIAGNOSIS — E03.9 HYPOTHYROIDISM, UNSPECIFIED: ICD-10-CM

## 2020-02-26 DIAGNOSIS — J18.9 PNEUMONIA, UNSPECIFIED ORGANISM: ICD-10-CM

## 2020-02-26 DIAGNOSIS — E87.1 HYPO-OSMOLALITY AND HYPONATREMIA: ICD-10-CM

## 2020-02-26 DIAGNOSIS — N39.0 URINARY TRACT INFECTION, SITE NOT SPECIFIED: ICD-10-CM

## 2020-02-26 DIAGNOSIS — Z90.49 ACQUIRED ABSENCE OF OTHER SPECIFIED PARTS OF DIGESTIVE TRACT: Chronic | ICD-10-CM

## 2020-02-26 DIAGNOSIS — J47.9 BRONCHIECTASIS, UNCOMPLICATED: ICD-10-CM

## 2020-02-26 DIAGNOSIS — Z29.9 ENCOUNTER FOR PROPHYLACTIC MEASURES, UNSPECIFIED: ICD-10-CM

## 2020-02-26 LAB
ALBUMIN SERPL ELPH-MCNC: 3.3 G/DL — LOW (ref 3.5–5)
ALP SERPL-CCNC: 94 U/L — SIGNIFICANT CHANGE UP (ref 40–120)
ALT FLD-CCNC: 51 U/L DA — SIGNIFICANT CHANGE UP (ref 10–60)
ANION GAP SERPL CALC-SCNC: 10 MMOL/L — SIGNIFICANT CHANGE UP (ref 5–17)
ANION GAP SERPL CALC-SCNC: 8 MMOL/L — SIGNIFICANT CHANGE UP (ref 5–17)
ANION GAP SERPL CALC-SCNC: 9 MMOL/L — SIGNIFICANT CHANGE UP (ref 5–17)
ANISOCYTOSIS BLD QL: SLIGHT — SIGNIFICANT CHANGE UP
APPEARANCE UR: CLEAR — SIGNIFICANT CHANGE UP
APTT BLD: 29.9 SEC — SIGNIFICANT CHANGE UP (ref 27.5–36.3)
AST SERPL-CCNC: 32 U/L — SIGNIFICANT CHANGE UP (ref 10–40)
BACTERIA # UR AUTO: NEGATIVE /HPF — SIGNIFICANT CHANGE UP
BASO STIPL BLD QL SMEAR: PRESENT — SIGNIFICANT CHANGE UP
BASOPHILS # BLD AUTO: 0 K/UL — SIGNIFICANT CHANGE UP (ref 0–0.2)
BASOPHILS NFR BLD AUTO: 0 % — SIGNIFICANT CHANGE UP (ref 0–2)
BILIRUB SERPL-MCNC: 1 MG/DL — SIGNIFICANT CHANGE UP (ref 0.2–1.2)
BILIRUB UR-MCNC: NEGATIVE — SIGNIFICANT CHANGE UP
BUN SERPL-MCNC: 12 MG/DL — SIGNIFICANT CHANGE UP (ref 7–18)
BUN SERPL-MCNC: 13 MG/DL — SIGNIFICANT CHANGE UP (ref 7–18)
BUN SERPL-MCNC: 14 MG/DL — SIGNIFICANT CHANGE UP (ref 7–18)
CALCIUM SERPL-MCNC: 8.1 MG/DL — LOW (ref 8.4–10.5)
CALCIUM SERPL-MCNC: 8.6 MG/DL — SIGNIFICANT CHANGE UP (ref 8.4–10.5)
CALCIUM SERPL-MCNC: 9 MG/DL — SIGNIFICANT CHANGE UP (ref 8.4–10.5)
CHLORIDE SERPL-SCNC: 100 MMOL/L — SIGNIFICANT CHANGE UP (ref 96–108)
CHLORIDE SERPL-SCNC: 93 MMOL/L — LOW (ref 96–108)
CHLORIDE SERPL-SCNC: 99 MMOL/L — SIGNIFICANT CHANGE UP (ref 96–108)
CHLORIDE UR-SCNC: 11 MMOL/L — SIGNIFICANT CHANGE UP
CO2 SERPL-SCNC: 21 MMOL/L — LOW (ref 22–31)
CO2 SERPL-SCNC: 25 MMOL/L — SIGNIFICANT CHANGE UP (ref 22–31)
CO2 SERPL-SCNC: 26 MMOL/L — SIGNIFICANT CHANGE UP (ref 22–31)
COLOR SPEC: YELLOW — SIGNIFICANT CHANGE UP
CREAT ?TM UR-MCNC: 44 MG/DL — SIGNIFICANT CHANGE UP
CREAT SERPL-MCNC: 0.75 MG/DL — SIGNIFICANT CHANGE UP (ref 0.5–1.3)
CREAT SERPL-MCNC: 0.89 MG/DL — SIGNIFICANT CHANGE UP (ref 0.5–1.3)
CREAT SERPL-MCNC: 0.94 MG/DL — SIGNIFICANT CHANGE UP (ref 0.5–1.3)
DIFF PNL FLD: ABNORMAL
EOSINOPHIL # BLD AUTO: 0 K/UL — SIGNIFICANT CHANGE UP (ref 0–0.5)
EOSINOPHIL NFR BLD AUTO: 0 % — SIGNIFICANT CHANGE UP (ref 0–6)
EPI CELLS # UR: SIGNIFICANT CHANGE UP /HPF
FLU A RESULT: SIGNIFICANT CHANGE UP
FLU A RESULT: SIGNIFICANT CHANGE UP
FLUAV AG NPH QL: SIGNIFICANT CHANGE UP
FLUBV AG NPH QL: SIGNIFICANT CHANGE UP
GIANT PLATELETS BLD QL SMEAR: PRESENT — SIGNIFICANT CHANGE UP
GLUCOSE SERPL-MCNC: 103 MG/DL — HIGH (ref 70–99)
GLUCOSE SERPL-MCNC: 118 MG/DL — HIGH (ref 70–99)
GLUCOSE SERPL-MCNC: 120 MG/DL — HIGH (ref 70–99)
GLUCOSE UR QL: NEGATIVE — SIGNIFICANT CHANGE UP
HCT VFR BLD CALC: 37.1 % — SIGNIFICANT CHANGE UP (ref 34.5–45)
HGB BLD-MCNC: 12.4 G/DL — SIGNIFICANT CHANGE UP (ref 11.5–15.5)
INR BLD: 1.35 RATIO — HIGH (ref 0.88–1.16)
KETONES UR-MCNC: ABNORMAL
LACTATE SERPL-SCNC: 1.3 MMOL/L — SIGNIFICANT CHANGE UP (ref 0.7–2)
LEGIONELLA AG UR QL: NEGATIVE — SIGNIFICANT CHANGE UP
LEUKOCYTE ESTERASE UR-ACNC: ABNORMAL
LYMPHOCYTES # BLD AUTO: 0.75 K/UL — LOW (ref 1–3.3)
LYMPHOCYTES # BLD AUTO: 5 % — LOW (ref 13–44)
MACROCYTES BLD QL: SLIGHT — SIGNIFICANT CHANGE UP
MANUAL SMEAR VERIFICATION: SIGNIFICANT CHANGE UP
MCHC RBC-ENTMCNC: 31.6 PG — SIGNIFICANT CHANGE UP (ref 27–34)
MCHC RBC-ENTMCNC: 33.4 GM/DL — SIGNIFICANT CHANGE UP (ref 32–36)
MCV RBC AUTO: 94.6 FL — SIGNIFICANT CHANGE UP (ref 80–100)
MONOCYTES # BLD AUTO: 1.5 K/UL — HIGH (ref 0–0.9)
MONOCYTES NFR BLD AUTO: 10 % — SIGNIFICANT CHANGE UP (ref 2–14)
NEUTROPHILS # BLD AUTO: 12.76 K/UL — HIGH (ref 1.8–7.4)
NEUTROPHILS NFR BLD AUTO: 85 % — HIGH (ref 43–77)
NITRITE UR-MCNC: NEGATIVE — SIGNIFICANT CHANGE UP
NRBC # BLD: 0 /100 — SIGNIFICANT CHANGE UP (ref 0–0)
OSMOLALITY SERPL: 275 MOSMOL/KG — LOW (ref 280–301)
OSMOLALITY UR: 245 MOS/KG — SIGNIFICANT CHANGE UP (ref 50–1200)
PH UR: 5 — SIGNIFICANT CHANGE UP (ref 5–8)
PLAT MORPH BLD: NORMAL — SIGNIFICANT CHANGE UP
PLATELET # BLD AUTO: 232 K/UL — SIGNIFICANT CHANGE UP (ref 150–400)
POIKILOCYTOSIS BLD QL AUTO: SLIGHT — SIGNIFICANT CHANGE UP
POLYCHROMASIA BLD QL SMEAR: SLIGHT — SIGNIFICANT CHANGE UP
POTASSIUM SERPL-MCNC: 3.7 MMOL/L — SIGNIFICANT CHANGE UP (ref 3.5–5.3)
POTASSIUM SERPL-MCNC: 3.7 MMOL/L — SIGNIFICANT CHANGE UP (ref 3.5–5.3)
POTASSIUM SERPL-MCNC: 3.9 MMOL/L — SIGNIFICANT CHANGE UP (ref 3.5–5.3)
POTASSIUM SERPL-SCNC: 3.7 MMOL/L — SIGNIFICANT CHANGE UP (ref 3.5–5.3)
POTASSIUM SERPL-SCNC: 3.7 MMOL/L — SIGNIFICANT CHANGE UP (ref 3.5–5.3)
POTASSIUM SERPL-SCNC: 3.9 MMOL/L — SIGNIFICANT CHANGE UP (ref 3.5–5.3)
PROCALCITONIN SERPL-MCNC: 0.53 NG/ML — HIGH (ref 0.02–0.1)
PROT ?TM UR-MCNC: 17 MG/DL — HIGH (ref 0–12)
PROT SERPL-MCNC: 7.3 G/DL — SIGNIFICANT CHANGE UP (ref 6–8.3)
PROT UR-MCNC: 30 MG/DL
PROTHROM AB SERPL-ACNC: 15.1 SEC — HIGH (ref 10–12.9)
RBC # BLD: 3.92 M/UL — SIGNIFICANT CHANGE UP (ref 3.8–5.2)
RBC # FLD: 13.9 % — SIGNIFICANT CHANGE UP (ref 10.3–14.5)
RBC BLD AUTO: ABNORMAL
RBC CASTS # UR COMP ASSIST: SIGNIFICANT CHANGE UP /HPF (ref 0–2)
RSV RESULT: SIGNIFICANT CHANGE UP
RSV RNA RESP QL NAA+PROBE: SIGNIFICANT CHANGE UP
SMUDGE CELLS # BLD: PRESENT — SIGNIFICANT CHANGE UP
SODIUM SERPL-SCNC: 127 MMOL/L — LOW (ref 135–145)
SODIUM SERPL-SCNC: 130 MMOL/L — LOW (ref 135–145)
SODIUM SERPL-SCNC: 134 MMOL/L — LOW (ref 135–145)
SODIUM UR-SCNC: 15 MMOL/L — SIGNIFICANT CHANGE UP
SP GR SPEC: 1.01 — SIGNIFICANT CHANGE UP (ref 1.01–1.02)
UROBILINOGEN FLD QL: NEGATIVE — SIGNIFICANT CHANGE UP
WBC # BLD: 15.01 K/UL — HIGH (ref 3.8–10.5)
WBC # FLD AUTO: 15.01 K/UL — HIGH (ref 3.8–10.5)
WBC UR QL: SIGNIFICANT CHANGE UP /HPF (ref 0–5)

## 2020-02-26 PROCEDURE — 99285 EMERGENCY DEPT VISIT HI MDM: CPT

## 2020-02-26 PROCEDURE — 71046 X-RAY EXAM CHEST 2 VIEWS: CPT | Mod: 26

## 2020-02-26 RX ORDER — CEFTRIAXONE 500 MG/1
1000 INJECTION, POWDER, FOR SOLUTION INTRAMUSCULAR; INTRAVENOUS ONCE
Refills: 0 | Status: COMPLETED | OUTPATIENT
Start: 2020-02-26 | End: 2020-02-26

## 2020-02-26 RX ORDER — LEVOTHYROXINE SODIUM 125 MCG
1 TABLET ORAL
Qty: 0 | Refills: 0 | DISCHARGE

## 2020-02-26 RX ORDER — ATORVASTATIN CALCIUM 80 MG/1
1 TABLET, FILM COATED ORAL
Qty: 0 | Refills: 0 | DISCHARGE

## 2020-02-26 RX ORDER — MONTELUKAST 4 MG/1
10 TABLET, CHEWABLE ORAL DAILY
Refills: 0 | Status: DISCONTINUED | OUTPATIENT
Start: 2020-02-26 | End: 2020-03-01

## 2020-02-26 RX ORDER — AZELASTINE 137 UG/1
2 SPRAY, METERED NASAL
Qty: 0 | Refills: 0 | DISCHARGE

## 2020-02-26 RX ORDER — AZITHROMYCIN 500 MG/1
500 TABLET, FILM COATED ORAL EVERY 24 HOURS
Refills: 0 | Status: DISCONTINUED | OUTPATIENT
Start: 2020-02-26 | End: 2020-03-01

## 2020-02-26 RX ORDER — IPRATROPIUM/ALBUTEROL SULFATE 18-103MCG
3 AEROSOL WITH ADAPTER (GRAM) INHALATION EVERY 6 HOURS
Refills: 0 | Status: DISCONTINUED | OUTPATIENT
Start: 2020-02-26 | End: 2020-03-01

## 2020-02-26 RX ORDER — SODIUM CHLORIDE 9 MG/ML
4 INJECTION INTRAMUSCULAR; INTRAVENOUS; SUBCUTANEOUS EVERY 6 HOURS
Refills: 0 | Status: DISCONTINUED | OUTPATIENT
Start: 2020-02-26 | End: 2020-02-28

## 2020-02-26 RX ORDER — FLUTICASONE PROPIONATE 220 MCG
1 AEROSOL WITH ADAPTER (GRAM) INHALATION
Qty: 0 | Refills: 0 | DISCHARGE

## 2020-02-26 RX ORDER — LEVOTHYROXINE SODIUM 125 MCG
50 TABLET ORAL DAILY
Refills: 0 | Status: DISCONTINUED | OUTPATIENT
Start: 2020-02-26 | End: 2020-03-01

## 2020-02-26 RX ORDER — ATORVASTATIN CALCIUM 80 MG/1
20 TABLET, FILM COATED ORAL AT BEDTIME
Refills: 0 | Status: DISCONTINUED | OUTPATIENT
Start: 2020-02-26 | End: 2020-03-01

## 2020-02-26 RX ORDER — AZELASTINE 137 UG/1
0 SPRAY, METERED NASAL
Qty: 0 | Refills: 0 | DISCHARGE

## 2020-02-26 RX ORDER — CEFTRIAXONE 500 MG/1
1000 INJECTION, POWDER, FOR SOLUTION INTRAMUSCULAR; INTRAVENOUS EVERY 24 HOURS
Refills: 0 | Status: DISCONTINUED | OUTPATIENT
Start: 2020-02-26 | End: 2020-03-01

## 2020-02-26 RX ORDER — ACETAMINOPHEN 500 MG
650 TABLET ORAL ONCE
Refills: 0 | Status: COMPLETED | OUTPATIENT
Start: 2020-02-26 | End: 2020-02-26

## 2020-02-26 RX ORDER — BECLOMETHASONE DIPROPIONATE 40 UG/1
1 AEROSOL, METERED RESPIRATORY (INHALATION)
Qty: 0 | Refills: 0 | DISCHARGE

## 2020-02-26 RX ORDER — ENOXAPARIN SODIUM 100 MG/ML
40 INJECTION SUBCUTANEOUS DAILY
Refills: 0 | Status: DISCONTINUED | OUTPATIENT
Start: 2020-02-26 | End: 2020-03-01

## 2020-02-26 RX ORDER — SODIUM CHLORIDE 9 MG/ML
0 INJECTION INTRAMUSCULAR; INTRAVENOUS; SUBCUTANEOUS
Qty: 0 | Refills: 0 | DISCHARGE

## 2020-02-26 RX ORDER — SODIUM CHLORIDE 9 MG/ML
1000 INJECTION INTRAMUSCULAR; INTRAVENOUS; SUBCUTANEOUS ONCE
Refills: 0 | Status: COMPLETED | OUTPATIENT
Start: 2020-02-26 | End: 2020-02-26

## 2020-02-26 RX ORDER — IPRATROPIUM/ALBUTEROL SULFATE 18-103MCG
3 AEROSOL WITH ADAPTER (GRAM) INHALATION ONCE
Refills: 0 | Status: COMPLETED | OUTPATIENT
Start: 2020-02-26 | End: 2020-02-26

## 2020-02-26 RX ORDER — MONTELUKAST 4 MG/1
1 TABLET, CHEWABLE ORAL
Qty: 0 | Refills: 0 | DISCHARGE

## 2020-02-26 RX ORDER — AZITHROMYCIN 500 MG/1
500 TABLET, FILM COATED ORAL ONCE
Refills: 0 | Status: COMPLETED | OUTPATIENT
Start: 2020-02-26 | End: 2020-02-26

## 2020-02-26 RX ORDER — SODIUM CHLORIDE 9 MG/ML
1000 INJECTION INTRAMUSCULAR; INTRAVENOUS; SUBCUTANEOUS
Refills: 0 | Status: DISCONTINUED | OUTPATIENT
Start: 2020-02-26 | End: 2020-03-01

## 2020-02-26 RX ADMIN — SODIUM CHLORIDE 1000 MILLILITER(S): 9 INJECTION INTRAMUSCULAR; INTRAVENOUS; SUBCUTANEOUS at 09:44

## 2020-02-26 RX ADMIN — AZITHROMYCIN 255 MILLIGRAM(S): 500 TABLET, FILM COATED ORAL at 16:34

## 2020-02-26 RX ADMIN — Medication 3 MILLILITER(S): at 21:24

## 2020-02-26 RX ADMIN — CEFTRIAXONE 100 MILLIGRAM(S): 500 INJECTION, POWDER, FOR SOLUTION INTRAMUSCULAR; INTRAVENOUS at 15:30

## 2020-02-26 RX ADMIN — Medication 650 MILLIGRAM(S): at 09:08

## 2020-02-26 RX ADMIN — Medication 3 MILLILITER(S): at 08:51

## 2020-02-26 RX ADMIN — ATORVASTATIN CALCIUM 20 MILLIGRAM(S): 80 TABLET, FILM COATED ORAL at 21:20

## 2020-02-26 RX ADMIN — Medication 650 MILLIGRAM(S): at 08:52

## 2020-02-26 RX ADMIN — CEFTRIAXONE 1000 MILLIGRAM(S): 500 INJECTION, POWDER, FOR SOLUTION INTRAMUSCULAR; INTRAVENOUS at 12:18

## 2020-02-26 RX ADMIN — SODIUM CHLORIDE 4 MILLILITER(S): 9 INJECTION INTRAMUSCULAR; INTRAVENOUS; SUBCUTANEOUS at 15:37

## 2020-02-26 RX ADMIN — CEFTRIAXONE 100 MILLIGRAM(S): 500 INJECTION, POWDER, FOR SOLUTION INTRAMUSCULAR; INTRAVENOUS at 09:54

## 2020-02-26 RX ADMIN — SODIUM CHLORIDE 1000 MILLILITER(S): 9 INJECTION INTRAMUSCULAR; INTRAVENOUS; SUBCUTANEOUS at 08:50

## 2020-02-26 RX ADMIN — AZITHROMYCIN 500 MILLIGRAM(S): 500 TABLET, FILM COATED ORAL at 12:18

## 2020-02-26 RX ADMIN — ENOXAPARIN SODIUM 40 MILLIGRAM(S): 100 INJECTION SUBCUTANEOUS at 12:17

## 2020-02-26 RX ADMIN — SODIUM CHLORIDE 4 MILLILITER(S): 9 INJECTION INTRAMUSCULAR; INTRAVENOUS; SUBCUTANEOUS at 21:24

## 2020-02-26 RX ADMIN — Medication 3 MILLILITER(S): at 15:33

## 2020-02-26 RX ADMIN — AZITHROMYCIN 255 MILLIGRAM(S): 500 TABLET, FILM COATED ORAL at 09:54

## 2020-02-26 NOTE — PATIENT PROFILE ADULT - BRADEN FRICTION AND SHEAR
vss af abd soft no flatus. Creat up some, hgb 9.1. Plan stop toradol, cont iv tylenol. Avoid narcotics. Walk. (3) no apparent problem

## 2020-02-26 NOTE — ED PROVIDER NOTE - CARE PLAN
Principal Discharge DX:	Pneumonia  Secondary Diagnosis:	UTI (urinary tract infection) Principal Discharge DX:	Pneumonia  Secondary Diagnosis:	UTI (urinary tract infection)  Secondary Diagnosis:	Hyponatremia

## 2020-02-26 NOTE — PATIENT PROFILE ADULT - NSPROCHRONICPAIN_GEN_A_NUR
Metastatic  On Zytiga -- need oncology approval.  On prednisone 5 mg po bid.   Also on Zometa and Leupron periodically.
yes

## 2020-02-26 NOTE — H&P ADULT - PROBLEM SELECTOR PLAN 5
IMPROVE VTE Individual Risk Assessment   RISK                                                          Points  [  ] Previous VTE                                                3  [  ] Thrombophilia                                             2  [  ] Lower limb paralysis                                    2        (unable to hold up >15 seconds)    [  ] Current Cancer                                             2         (within 6 months)  [  x] Immobilization > 24 hrs                              1  [  ] ICU/CCU stay > 24 hours                            1  [x  ] Age > 60                                                    1  IMPROVE VTE Score _________2  Lovenox for DVT prophylaxis Continue levothyroxine  F/u TSH

## 2020-02-26 NOTE — CONSULT NOTE ADULT - SUBJECTIVE AND OBJECTIVE BOX
CHIEF COMPLAINT: Patient is a 79y old  Female who presents with a chief complaint of Urinary hesitancy, shortness of breath, fever (2020 11:48)      HPI:  Pt is an 78 y/o F, from home, ambulates independently,  with bronchiectasis, UTI, hypothyroidism, e.coli bacteremia,  presented with urinary hesitancy and incomplete voiding of urine since 4 days. According to the pt, since   ( 4 days back) she has been feeling incomplete voiding sensation and hesitancy. She stated that she has  been having decreased urination. She mentioned that her urine frequency is usually high. She does not have burning sensation while passing urine or dysuria. She also has nausea and decreased appetite since few days. Since same time period, she has been having shortness of breath which is worse on exertion. Denies orthopnea. She had fever yesterday with Tmax; 100.2 at home. She denies chest pain, cough, abdominal pain, recent sick contacts, recent travel and other complains except mentioned above. (2020 11:48)   Patient seen and examined.     PAST MEDICAL & SURGICAL HISTORY:  E-coli UTI  Chronic bronchitis  Dyslipidemia  Pneumonia  Hypothyroid  S/P appendectomy      Allergies    amoxicillin (Rash)    Intolerances        MEDICATIONS  (STANDING):  albuterol/ipratropium for Nebulization 3 milliLiter(s) Nebulizer every 6 hours  atorvastatin 20 milliGRAM(s) Oral at bedtime  azithromycin  IVPB 500 milliGRAM(s) IV Intermittent every 24 hours  cefTRIAXone   IVPB 1000 milliGRAM(s) IV Intermittent every 24 hours  enoxaparin Injectable 40 milliGRAM(s) SubCutaneous daily  levothyroxine 50 MICROGram(s) Oral daily  montelukast 10 milliGRAM(s) Oral daily  sodium chloride 0.9%. 1000 milliLiter(s) (90 mL/Hr) IV Continuous <Continuous>  sodium chloride 3%  Inhalation 4 milliLiter(s) Inhalation every 6 hours      MEDICATIONS  (PRN):   Medications up to date at time of exam.    FAMILY HISTORY:  Family history of emphysema      SOCIAL HISTORY  Smoking History: [   ] smoking/smoke exposure, [   ] former smoker, [  ] denies smoking  Living Condition: [   ] apartment, [   ] private house  Work History:   Travel History: denies recent travel  Illicit Substance Use: denies  Alcohol Use: denies    REVIEW OF SYSTEMS:    CONSTITUTIONAL:  denies fevers, chills, sweats, weight loss    HEENT:  denies diplopia or blurred vision, sore throat or runny nose.    CARDIOVASCULAR:  denies pressure, squeezing, tightness, or heaviness about the chest; no palpitations.    RESPIRATORY:  denies SOB, cough, DIAZ, wheezing.    GASTROINTESTINAL:  denies abdominal pain, nausea, vomiting or diarrhea.    GENITOURINARY: denies dysuria, frequency or urgency.    NEUROLOGIC:  denies numbness, tingling, seizures or weakness.    PSYCHIATRIC:  denies disorder of thought or mood.    MSK: denies swelling, redness      PHYSICAL EXAMINATION:    GENERAL: The patient is a well-developed, well-nourished, in no apparent distress.     Vital Signs Last 24 Hrs  T(C): 36.8 (2020 14:30), Max: 38.2 (2020 08:24)  T(F): 98.2 (2020 14:30), Max: 100.7 (2020 08:24)  HR: 78 (2020 14:30) (78 - 94)  BP: 105/54 (2020 14:30) (105/54 - 128/70)  BP(mean): --  RR: 16 (2020 14:30) (16 - 16)  SpO2: 95% (2020 14:30) (95% - 97%)   (if applicable)    Chest Tube (if applicable)    HEENT: Head is normocephalic and atraumatic. .    NECK: Supple, no palpable adenopathy.    LUNGS: Clear to auscultation, no wheezing, rales, or rhonchi.    HEART: Regular rate and rhythm without murmur.    ABDOMEN: Soft, nontender, and nondistended.  No hepatosplenomegaly is noted.    EXTREMITIES: Without any cyanosis, clubbing, rash, lesions or edema.    NEUROLOGIC: Awake, alert.    SKIN: Warm, dry, good turgor.      LABS:                        12.4   15.01 )-----------( 232      ( 2020 08:50 )             37.1         134<L>  |  100  |  14  ----------------------------<  103<H>  3.7   |  26  |  0.89    Ca    8.6      2020 15:37    TPro  7.3  /  Alb  3.3<L>  /  TBili  1.0  /  DBili  x   /  AST  32  /  ALT  51  /  AlkPhos  94      PT/INR - ( 2020 08:50 )   PT: 15.1 sec;   INR: 1.35 ratio         PTT - ( 2020 08:50 )  PTT:29.9 sec  Urinalysis Basic - ( 2020 08:42 )    Color: Yellow / Appearance: Clear / S.010 / pH: x  Gluc: x / Ketone: Small  / Bili: Negative / Urobili: Negative   Blood: x / Protein: 30 mg/dL / Nitrite: Negative   Leuk Esterase: Trace / RBC: 0-2 /HPF / WBC 3-5 /HPF   Sq Epi: x / Non Sq Epi: Few /HPF / Bacteria: Negative /HPF          Lactate, Blood: 1.3 mmol/L (20 @ 08:49)        MICROBIOLOGY: (if applicable)    RADIOLOGY & ADDITIONAL STUDIES:  EKG:   CXR:  < from: Xray Chest 2 Views PA/Lat (20 @ 09:31) >    EXAM:  XR CHEST PA LAT 2V                            PROCEDURE DATE:  2020          INTERPRETATION:  CLINICAL INDICATION: 79 years  Female with wheezing, fever.    COMPARISON: 2019    AP view of the chest demonstrates a right basilar infiltrate. There is a questionable 1 cm nodule versus overlying artifact at the right lung apex. The left lung is clear. There are trace bilateral pleural effusions.. There is no pneumothorax.    The heart is normal in size. There is no mediastinal or hilar mass.     The pulmonary vasculature is normal.     Mild thoracic degenerative changes and marked scoliosis are present.    IMPRESSION:    Acute right basilar infiltrate. Trace bilateral pleural effusions.    Right apical lung nodule versus artifact related to overlying soft tissue. Recommend repeat study.                IMAN POOL M.D., ATTENDING RADIOLOGIST  This document has been electronically signed. 2020 10:03AM                < end of copied text >    ECHO:    IMPRESSION: 79y Female PAST MEDICAL & SURGICAL HISTORY:  E-coli UTI  Chronic bronchitis  Dyslipidemia  Pneumonia  Hypothyroid  S/P appendectomy           Pt is an 78 y/o F, from home, ambulates independently,  with bronchiectasis, UTI, hypothyroidism, e.coli bacteremia,  presented with urinary hesitancy and incomplete voiding of urine since 4 days. According to the pt, since   ( 4 days back) she has been feeling incomplete voiding sensation and hesitancy. She stated that she has  been having decreased urination. She mentioned that her urine frequency is usually high. She does not have burning sensation while passing urine or dysuria. She also has nausea and decreased appetite since few days. Since same time period, she has been having shortness of breath which is worse on exertion. Denies orthopnea. She had fever yesterday with Tmax; 100.2 at home. She denies chest pain, cough, abdominal pain, recent sick contacts, recent travel and other complains except mentioned above.     --    Patient presents with UTI, SOB, she has a pmhx of bronchiectasis and is currently being managed by Dr. Butler at [facility].  SOB likely 2/2 PNA and bronchiectasis    +leukocytosis 2/2 PNA, UTI  +bronchiectasis   +low grade fever    SUGGESTION:   - airway clearance vest   - bronchodilators, o2 supp prn    - RVP, bcx   - con't w/ abx   - DVT and GI prophylaxis. CHIEF COMPLAINT: Patient is a 79y old  Female who presents with a chief complaint of Urinary hesitancy, shortness of breath, fever (2020 11:48)      HPI:  Pt is an 78 y/o F, from home, ambulates independently,  with bronchiectasis, UTI, hypothyroidism, e.coli bacteremia,  presented with urinary hesitancy and incomplete voiding of urine since 4 days. According to the pt, since   ( 4 days back) she has been feeling incomplete voiding sensation and hesitancy. She stated that she has  been having decreased urination. She mentioned that her urine frequency is usually high. She does not have burning sensation while passing urine or dysuria. She also has nausea and decreased appetite since few days. Since same time period, she has been having shortness of breath which is worse on exertion. Denies orthopnea. She had fever yesterday with Tmax; 100.2 at home. She denies chest pain, cough, abdominal pain, recent sick contacts, recent travel and other complains except mentioned above. (2020 11:48)   Patient seen and examined.     PAST MEDICAL & SURGICAL HISTORY:  E-coli UTI  Chronic bronchitis  Dyslipidemia  Pneumonia  Hypothyroid  S/P appendectomy      Allergies    amoxicillin (Rash)    Intolerances        MEDICATIONS  (STANDING):  albuterol/ipratropium for Nebulization 3 milliLiter(s) Nebulizer every 6 hours  atorvastatin 20 milliGRAM(s) Oral at bedtime  azithromycin  IVPB 500 milliGRAM(s) IV Intermittent every 24 hours  cefTRIAXone   IVPB 1000 milliGRAM(s) IV Intermittent every 24 hours  enoxaparin Injectable 40 milliGRAM(s) SubCutaneous daily  levothyroxine 50 MICROGram(s) Oral daily  montelukast 10 milliGRAM(s) Oral daily  sodium chloride 0.9%. 1000 milliLiter(s) (90 mL/Hr) IV Continuous <Continuous>  sodium chloride 3%  Inhalation 4 milliLiter(s) Inhalation every 6 hours      MEDICATIONS  (PRN):   Medications up to date at time of exam.    FAMILY HISTORY:  Family history of emphysema      SOCIAL HISTORY  Smoking History: [x   ] smoking/smoke exposure, [   ] former smoker, [  ] denies smoking  Living Condition: [   ] apartment, [   ] private house  Work History:   Travel History: denies recent travel  Illicit Substance Use: denies  Alcohol Use: denies    REVIEW OF SYSTEMS:    CONSTITUTIONAL:  denies fevers, chills, sweats, weight loss    HEENT:  denies diplopia or blurred vision, sore throat or runny nose.    CARDIOVASCULAR:  denies pressure, squeezing, tightness, or heaviness about the chest; no palpitations.    RESPIRATORY:  denies SOB, cough, DIAZ, wheezing.    GASTROINTESTINAL:  denies abdominal pain, nausea, vomiting or diarrhea.    GENITOURINARY: denies dysuria, frequency or urgency.    NEUROLOGIC:  denies numbness, tingling, seizures or weakness.    PSYCHIATRIC:  denies disorder of thought or mood.    MSK: denies swelling, redness      PHYSICAL EXAMINATION:    GENERAL: The patient is a well-developed, well-nourished, in no apparent distress.     Vital Signs Last 24 Hrs  T(C): 36.8 (2020 14:30), Max: 38.2 (2020 08:24)  T(F): 98.2 (2020 14:30), Max: 100.7 (2020 08:24)  HR: 78 (2020 14:30) (78 - 94)  BP: 105/54 (2020 14:30) (105/54 - 128/70)  BP(mean): --  RR: 16 (2020 14:30) (16 - 16)  SpO2: 95% (2020 14:30) (95% - 97%)   (if applicable)    Chest Tube (if applicable)    HEENT: Head is normocephalic and atraumatic. .    NECK: Supple, no palpable adenopathy.    LUNGS: Clear to auscultation, no wheezing, rales, or rhonchi. +tachypenic    HEART: Regular rate and rhythm without murmur.    ABDOMEN: Soft, nontender, and nondistended.  No hepatosplenomegaly is noted.    EXTREMITIES: Without any cyanosis, clubbing, rash, lesions or edema.    NEUROLOGIC: Awake, alert.    SKIN: Warm, dry, good turgor.      LABS:                        12.4   15.01 )-----------( 232      ( 2020 08:50 )             37.1         134<L>  |  100  |  14  ----------------------------<  103<H>  3.7   |  26  |  0.89    Ca    8.6      2020 15:37    TPro  7.3  /  Alb  3.3<L>  /  TBili  1.0  /  DBili  x   /  AST  32  /  ALT  51  /  AlkPhos  94      PT/INR - ( 2020 08:50 )   PT: 15.1 sec;   INR: 1.35 ratio         PTT - ( 2020 08:50 )  PTT:29.9 sec  Urinalysis Basic - ( 2020 08:42 )    Color: Yellow / Appearance: Clear / S.010 / pH: x  Gluc: x / Ketone: Small  / Bili: Negative / Urobili: Negative   Blood: x / Protein: 30 mg/dL / Nitrite: Negative   Leuk Esterase: Trace / RBC: 0-2 /HPF / WBC 3-5 /HPF   Sq Epi: x / Non Sq Epi: Few /HPF / Bacteria: Negative /HPF          Lactate, Blood: 1.3 mmol/L (20 @ 08:49)        MICROBIOLOGY: (if applicable)    RADIOLOGY & ADDITIONAL STUDIES:  EKG:   CXR:  < from: Xray Chest 2 Views PA/Lat (20 @ 09:31) >    EXAM:  XR CHEST PA LAT 2V                            PROCEDURE DATE:  2020          INTERPRETATION:  CLINICAL INDICATION: 79 years  Female with wheezing, fever.    COMPARISON: 2019    AP view of the chest demonstrates a right basilar infiltrate. There is a questionable 1 cm nodule versus overlying artifact at the right lung apex. The left lung is clear. There are trace bilateral pleural effusions.. There is no pneumothorax.    The heart is normal in size. There is no mediastinal or hilar mass.     The pulmonary vasculature is normal.     Mild thoracic degenerative changes and marked scoliosis are present.    IMPRESSION:    Acute right basilar infiltrate. Trace bilateral pleural effusions.    Right apical lung nodule versus artifact related to overlying soft tissue. Recommend repeat study.                IMAN POLO M.D., ATTENDING RADIOLOGIST  This document has been electronically signed. 2020 10:03AM                < end of copied text >    ECHO:    IMPRESSION: 79y Female PAST MEDICAL & SURGICAL HISTORY:  E-coli UTI  Chronic bronchitis  Dyslipidemia  Pneumonia  Hypothyroid  S/P appendectomy           Pt is an 78 y/o F, from home, ambulates independently,  with bronchiectasis, UTI, hypothyroidism, e.coli bacteremia,  presented with urinary hesitancy and incomplete voiding of urine since 4 days. According to the pt, since   ( 4 days back) she has been feeling incomplete voiding sensation and hesitancy. She stated that she has  been having decreased urination. She mentioned that her urine frequency is usually high. She does not have burning sensation while passing urine or dysuria. She also has nausea and decreased appetite since few days. Since same time period, she has been having shortness of breath which is worse on exertion. Denies orthopnea. She had fever yesterday with Tmax; 100.2 at home. She denies chest pain, cough, abdominal pain, recent sick contacts, recent travel and other complains except mentioned above.     --    Patient presents with UTI, SOB, she has a pmhx of bronchiectasis and is currently being managed by Dr. Butler at [facility].  SOB likely 2/2 PNA and bronchiectasis    +leukocytosis 2/2 PNA, UTI  +bronchiectasis   +low grade fever    SUGGESTION:   - airway clearance vest   - bronchodilators, o2 supp prn    - RVP, bcx   - con't w/ abx   - CT chest (ordered)   - DVT and GI prophylaxis. CHIEF COMPLAINT: Patient is a 79y old  Female who presents with a chief complaint of Urinary hesitancy, shortness of breath, fever (2020 11:48)      HPI:  Pt is an 80 y/o F, from home, ambulates independently,  with bronchiectasis, UTI, hypothyroidism, e.coli bacteremia,  presented with urinary hesitancy and incomplete voiding of urine since 4 days. According to the pt, since   ( 4 days back) she has been feeling incomplete voiding sensation and hesitancy. She stated that she has  been having decreased urination. She mentioned that her urine frequency is usually high. She does not have burning sensation while passing urine or dysuria. She also has nausea and decreased appetite since few days. Since same time period, she has been having shortness of breath which is worse on exertion. Denies orthopnea. She had fever yesterday with Tmax; 100.2 at home. She denies chest pain, cough, abdominal pain, recent sick contacts, recent travel and other complains except mentioned above. (2020 11:48)   Patient seen and examined.     PAST MEDICAL & SURGICAL HISTORY:  E-coli UTI  Chronic bronchitis  Dyslipidemia  Pneumonia  Hypothyroid  S/P appendectomy      Allergies    amoxicillin (Rash)    Intolerances        MEDICATIONS  (STANDING):  albuterol/ipratropium for Nebulization 3 milliLiter(s) Nebulizer every 6 hours  atorvastatin 20 milliGRAM(s) Oral at bedtime  azithromycin  IVPB 500 milliGRAM(s) IV Intermittent every 24 hours  cefTRIAXone   IVPB 1000 milliGRAM(s) IV Intermittent every 24 hours  enoxaparin Injectable 40 milliGRAM(s) SubCutaneous daily  levothyroxine 50 MICROGram(s) Oral daily  montelukast 10 milliGRAM(s) Oral daily  sodium chloride 0.9%. 1000 milliLiter(s) (90 mL/Hr) IV Continuous <Continuous>  sodium chloride 3%  Inhalation 4 milliLiter(s) Inhalation every 6 hours      MEDICATIONS  (PRN):   Medications up to date at time of exam.    FAMILY HISTORY:  Family history of emphysema      SOCIAL HISTORY  Smoking History: [x   ] smoking/smoke exposure, [   ] former smoker, [  ] denies smoking  Living Condition: [   ] apartment, [   ] private house  Work History:   Travel History: denies recent travel  Illicit Substance Use: denies  Alcohol Use: denies    REVIEW OF SYSTEMS:    CONSTITUTIONAL:  denies fevers, chills, sweats, weight loss    HEENT:  denies diplopia or blurred vision, sore throat or runny nose.    CARDIOVASCULAR:  denies pressure, squeezing, tightness, or heaviness about the chest; no palpitations.    RESPIRATORY:  denies SOB, cough, DIAZ, wheezing.    GASTROINTESTINAL:  denies abdominal pain, nausea, vomiting or diarrhea.    GENITOURINARY: denies dysuria, frequency or urgency.    NEUROLOGIC:  denies numbness, tingling, seizures or weakness.    PSYCHIATRIC:  denies disorder of thought or mood.    MSK: denies swelling, redness      PHYSICAL EXAMINATION:    GENERAL: The patient is a well-developed, well-nourished, in no apparent distress.     Vital Signs Last 24 Hrs  T(C): 36.8 (2020 14:30), Max: 38.2 (2020 08:24)  T(F): 98.2 (2020 14:30), Max: 100.7 (2020 08:24)  HR: 78 (2020 14:30) (78 - 94)  BP: 105/54 (2020 14:30) (105/54 - 128/70)  BP(mean): --  RR: 16 (2020 14:30) (16 - 16)  SpO2: 95% (2020 14:30) (95% - 97%)   (if applicable)    Chest Tube (if applicable)    HEENT: Head is normocephalic and atraumatic. .    NECK: Supple, no palpable adenopathy.    LUNGS: Clear to auscultation, no wheezing, rales, or rhonchi. +tachypenic    HEART: Regular rate and rhythm without murmur.    ABDOMEN: Soft, nontender, and nondistended.  No hepatosplenomegaly is noted.    EXTREMITIES: Without any cyanosis, clubbing, rash, lesions or edema.    NEUROLOGIC: Awake, alert.    SKIN: Warm, dry, good turgor.      LABS:                        12.4   15.01 )-----------( 232      ( 2020 08:50 )             37.1         134<L>  |  100  |  14  ----------------------------<  103<H>  3.7   |  26  |  0.89    Ca    8.6      2020 15:37    TPro  7.3  /  Alb  3.3<L>  /  TBili  1.0  /  DBili  x   /  AST  32  /  ALT  51  /  AlkPhos  94      PT/INR - ( 2020 08:50 )   PT: 15.1 sec;   INR: 1.35 ratio         PTT - ( 2020 08:50 )  PTT:29.9 sec  Urinalysis Basic - ( 2020 08:42 )    Color: Yellow / Appearance: Clear / S.010 / pH: x  Gluc: x / Ketone: Small  / Bili: Negative / Urobili: Negative   Blood: x / Protein: 30 mg/dL / Nitrite: Negative   Leuk Esterase: Trace / RBC: 0-2 /HPF / WBC 3-5 /HPF   Sq Epi: x / Non Sq Epi: Few /HPF / Bacteria: Negative /HPF          Lactate, Blood: 1.3 mmol/L (20 @ 08:49)        MICROBIOLOGY: (if applicable)    RADIOLOGY & ADDITIONAL STUDIES:  EKG:   CXR:  < from: Xray Chest 2 Views PA/Lat (20 @ 09:31) >    EXAM:  XR CHEST PA LAT 2V                            PROCEDURE DATE:  2020          INTERPRETATION:  CLINICAL INDICATION: 79 years  Female with wheezing, fever.    COMPARISON: 2019    AP view of the chest demonstrates a right basilar infiltrate. There is a questionable 1 cm nodule versus overlying artifact at the right lung apex. The left lung is clear. There are trace bilateral pleural effusions.. There is no pneumothorax.    The heart is normal in size. There is no mediastinal or hilar mass.     The pulmonary vasculature is normal.     Mild thoracic degenerative changes and marked scoliosis are present.    IMPRESSION:    Acute right basilar infiltrate. Trace bilateral pleural effusions.    Right apical lung nodule versus artifact related to overlying soft tissue. Recommend repeat study.                IMAN POOL M.D., ATTENDING RADIOLOGIST  This document has been electronically signed. 2020 10:03AM                < end of copied text >    ECHO:    IMPRESSION: 79y Female PAST MEDICAL & SURGICAL HISTORY:  E-coli UTI  Chronic bronchitis  Dyslipidemia  Pneumonia  Hypothyroid  S/P appendectomy           Pt is an 80 y/o F, from home, ambulates independently,  with bronchiectasis, UTI, hypothyroidism, e.coli bacteremia,  presented with urinary hesitancy and incomplete voiding of urine since 4 days. According to the pt, since   ( 4 days back) she has been feeling incomplete voiding sensation and hesitancy. She stated that she has  been having decreased urination. She mentioned that her urine frequency is usually high. She does not have burning sensation while passing urine or dysuria. She also has nausea and decreased appetite since few days. Since same time period, she has been having shortness of breath which is worse on exertion. Denies orthopnea. She had fever yesterday with Tmax; 100.2 at home. She denies chest pain, cough, abdominal pain, recent sick contacts, recent travel and other complains except mentioned above.     --    Patient presents with UTI, SOB, she has a pmhx of bronchiectasis and is currently being managed by Dr. Butler at [facility].  SOB likely 2/2 PNA and bronchiectasis    +leukocytosis 2/2 PNA, UTI  +bronchiectasis   +low grade fever    SUGGESTION:   - airway clearance vest   - bronchodilators, o2 supp prn    - RVP, bcx   - con't w/ abx   - CT chest (ordered)   - DVT and GI prophylaxis.   Agree with above assessment and plan as transcribed.

## 2020-02-26 NOTE — H&P ADULT - NEUROLOGICAL DETAILS
deep reflexes intact/sensation intact/cranial nerves intact/superficial reflexes intact/alert and oriented x 3/normal strength

## 2020-02-26 NOTE — ED PROVIDER NOTE - CLINICAL SUMMARY MEDICAL DECISION MAKING FREE TEXT BOX
78 yo F with urinary hesitancy and SOB. Diffuse wheezing on exam. UA + for hematuria and CXR remarkable for PNA. Patient with leukocytosis to 15. admitted for tx of PNA and UTI. Endorsed to Dr. Sweeney.

## 2020-02-26 NOTE — H&P ADULT - HISTORY OF PRESENT ILLNESS
Pt is an 78 y/o F, from home, ambulates independently,  with bronchiectasis, UTI, hypothyroidism, e.coli bacteremia,  presented with urinary hesitancy and incomplete voiding of urine since 4 days. According to the pt, since Sunday  ( 4 days back) she has been feeling incomplete voiding sensation and hesitancy. She stated that she has decreased urination. She mentioned that her urine frequency is usually high. She does not have burning sensation while passing urine or dysuria. She also has nausea and decreased appetite since few days. Since same time period, she has been having shortness of breath which is worse on exertion. Denies orthopnea. She had fever yesterday with Tmax; 100.2 at home. She denies chest pain, cough, abdominal pain, recent sick contacts, recent travel and other complains except mentioned above. Pt is an 80 y/o F, from home, ambulates independently,  with bronchiectasis, UTI, hypothyroidism, e.coli bacteremia,  presented with urinary hesitancy and incomplete voiding of urine since 4 days. According to the pt, since Sunday  ( 4 days back) she has been feeling incomplete voiding sensation and hesitancy. She stated that she has  been having decreased urination. She mentioned that her urine frequency is usually high. She does not have burning sensation while passing urine or dysuria. She also has nausea and decreased appetite since few days. Since same time period, she has been having shortness of breath which is worse on exertion. Denies orthopnea. She had fever yesterday with Tmax; 100.2 at home. She denies chest pain, cough, abdominal pain, recent sick contacts, recent travel and other complains except mentioned above.

## 2020-02-26 NOTE — ED PROVIDER NOTE - OBJECTIVE STATEMENT
79 y.o female with a PMHx of an ecoli UTI, HLD, bronchitises, hypothyroid and a PSHx of s/p appendectomy presents to the ED c/o urinary hesitance and incomplete emptying since yesterday with subjective fever and chills. Patient also has worsening shortness of breath for a few days with chronic wheezing does go to a pulmonologist at [facility] with multiple breathing treatments. Patient denies any other acute complaints. Allergies : Amoxicillin (Rash). 79 y.o female with a PMHx of an E.coli UTI, HLD, bronchiectasis, hypothyroid and a PSHx of s/p appendectomy presents to the ED c/o urinary hesitance and incomplete emptying since yesterday. Associated with subjective fevers and chills. Patient also has worsening shortness of breath for a few days with chronic wheezing. Does follow with a pulmonologist at [facility]. Subjective fevers last night. Denies nausea, emesis, chest pain, shortness of breath, abdominal pain, dysuria, hematuria, diarrhea, constipation, or any other acute complaints.

## 2020-02-26 NOTE — H&P ADULT - ATTENDING COMMENTS
Above  noted  Pt is an 78 y/o F, from home, ambulates independently,  with bronchiectasis, UTI, hypothyroidism, e.coli bacteremia,  presented with urinary hesitancy and incomplete voiding of urine since 4 days. According to the pt, since Sunday  ( 4 days back) she has been feeling incomplete voiding sensation and hesitancy. She stated that she has  been having decreased urination. She mentioned that her urine frequency is usually high. She does not have burning sensation while passing urine or dysuria. She also has nausea and decreased appetite since few days. Since same time period, she has been having shortness of breath which is worse on exertion. Denies orthopnea. She had fever yesterday with Tmax; 100.2 at home. She denies chest pain, cough, abdominal pain, recent sick contacts, recent travel and other complains except mentioned above.   blood test radiology  report  reviewed   Impression  Pneumonia    UTI  Bronchiectasia    Hypothyroidism    Admit to  the  floor  septic  work up  Pulmonary evaluation  GI DVT prophylaxis  nutritional support  Continue  antibiotics  as  prescribed  nutritional support Above  noted  Pt is an 80 y/o F, from home, ambulates independently,  with bronchiectasis, UTI, hypothyroidism, e.coli bacteremia,  presented with urinary hesitancy and incomplete voiding of urine since 4 days. According to the pt, since Sunday  ( 4 days back) she has been feeling incomplete voiding sensation and hesitancy. She stated that she has  been having decreased urination. She mentioned that her urine frequency is usually high. She does not have burning sensation while passing urine or dysuria. She also has nausea and decreased appetite since few days. Since same time period, she has been having shortness of breath which is worse on exertion. Denies orthopnea. She had fever yesterday with Tmax; 100.2 at home. She denies chest pain, cough, abdominal pain, recent sick contacts, recent travel and other complains except mentioned above.   blood test radiology  report  reviewed   Impression  Pneumonia    UTI  Hyponatremia  fluids  restriction monitor  BT   Bronchiectasia    Hypothyroidism    Admit to  the  floor  septic  work up  Pulmonary evaluation  GI DVT prophylaxis  nutritional support  Continue  antibiotics  as  prescribed  nutritional support

## 2020-02-26 NOTE — H&P ADULT - NSICDXPASTMEDICALHX_GEN_ALL_CORE_FT
PAST MEDICAL HISTORY:  Chronic bronchitis     Dyslipidemia     E-coli UTI     Hypothyroid     Pneumonia

## 2020-02-26 NOTE — H&P ADULT - PROBLEM SELECTOR PLAN 3
Pt has h/o Bronchiectasis   Continue Duoneb with sodium chloride inhalation Na: 127 on admission  Pt mentioned  that she has jaleel drinking  a lot of water  F/u serum osmolality, urine osmolality, urine sodium  Monitor BMP

## 2020-02-26 NOTE — H&P ADULT - ASSESSMENT
Pt is an 78 y/o F, from home, ambulates independently,  with bronchiectasis, UTI, hypothyroidism, e.coli bacteremia,  presented with urinary hesitancy and incomplete voiding of urine since 4 days

## 2020-02-26 NOTE — H&P ADULT - PROBLEM SELECTOR PLAN 2
Na: 127 on admission  Pt mentioned  that she has jaleel drinking  a lot of water  F/u serum osmolality, urine osmolality, urine sodium  Monitor BMP Pt has urinary hesitancy  UA not significant  Continue Ceftriaxone  F/u urine culture

## 2020-02-26 NOTE — H&P ADULT - NSHPLABSRESULTS_GEN_ALL_CORE
12.4   15.01 )-----------( 232      ( 26 Feb 2020 08:50 )             37.1       02-26    130<L>  |  99  |  12  ----------------------------<  118<H>  3.7   |  21<L>  |  0.75    Ca    8.1<L>      26 Feb 2020 10:21    TPro  7.3  /  Alb  3.3<L>  /  TBili  1.0  /  DBili  x   /  AST  32  /  ALT  51  /  AlkPhos  94  02-26

## 2020-02-26 NOTE — H&P ADULT - PROBLEM SELECTOR PLAN 4
Continue levothyroxine  F/u TSH Pt has h/o Bronchiectasis   Continue Duoneb with sodium chloride inhalation  Chest PT

## 2020-02-26 NOTE — H&P ADULT - NSHPSOCIALHISTORY_GEN_ALL_CORE
Pt lives with  . She denies smoking cigarettes. Drinks wine occasionally. She used to work as a teacher.

## 2020-02-27 ENCOUNTER — TRANSCRIPTION ENCOUNTER (OUTPATIENT)
Age: 80
End: 2020-02-27

## 2020-02-27 LAB
24R-OH-CALCIDIOL SERPL-MCNC: 70.4 NG/ML — SIGNIFICANT CHANGE UP (ref 30–80)
ANION GAP SERPL CALC-SCNC: 6 MMOL/L — SIGNIFICANT CHANGE UP (ref 5–17)
BUN SERPL-MCNC: 12 MG/DL — SIGNIFICANT CHANGE UP (ref 7–18)
CALCIUM SERPL-MCNC: 8.1 MG/DL — LOW (ref 8.4–10.5)
CHLORIDE SERPL-SCNC: 108 MMOL/L — SIGNIFICANT CHANGE UP (ref 96–108)
CHOLEST SERPL-MCNC: 87 MG/DL — SIGNIFICANT CHANGE UP (ref 10–199)
CO2 SERPL-SCNC: 25 MMOL/L — SIGNIFICANT CHANGE UP (ref 22–31)
CREAT SERPL-MCNC: 0.85 MG/DL — SIGNIFICANT CHANGE UP (ref 0.5–1.3)
CULTURE RESULTS: SIGNIFICANT CHANGE UP
GLUCOSE SERPL-MCNC: 91 MG/DL — SIGNIFICANT CHANGE UP (ref 70–99)
HBA1C BLD-MCNC: 5.6 % — SIGNIFICANT CHANGE UP (ref 4–5.6)
HCT VFR BLD CALC: 31.1 % — LOW (ref 34.5–45)
HDLC SERPL-MCNC: 40 MG/DL — LOW
HGB BLD-MCNC: 9.9 G/DL — LOW (ref 11.5–15.5)
LEGIONELLA AG UR QL: NEGATIVE — SIGNIFICANT CHANGE UP
LIPID PNL WITH DIRECT LDL SERPL: 40 MG/DL — SIGNIFICANT CHANGE UP
MAGNESIUM SERPL-MCNC: 2.5 MG/DL — SIGNIFICANT CHANGE UP (ref 1.6–2.6)
MCHC RBC-ENTMCNC: 30.8 PG — SIGNIFICANT CHANGE UP (ref 27–34)
MCHC RBC-ENTMCNC: 31.8 GM/DL — LOW (ref 32–36)
MCV RBC AUTO: 96.9 FL — SIGNIFICANT CHANGE UP (ref 80–100)
NRBC # BLD: 0 /100 WBCS — SIGNIFICANT CHANGE UP (ref 0–0)
PHOSPHATE SERPL-MCNC: 2.8 MG/DL — SIGNIFICANT CHANGE UP (ref 2.5–4.5)
PLATELET # BLD AUTO: 230 K/UL — SIGNIFICANT CHANGE UP (ref 150–400)
POTASSIUM SERPL-MCNC: 3.7 MMOL/L — SIGNIFICANT CHANGE UP (ref 3.5–5.3)
POTASSIUM SERPL-SCNC: 3.7 MMOL/L — SIGNIFICANT CHANGE UP (ref 3.5–5.3)
RBC # BLD: 3.21 M/UL — LOW (ref 3.8–5.2)
RBC # FLD: 14.1 % — SIGNIFICANT CHANGE UP (ref 10.3–14.5)
SODIUM SERPL-SCNC: 139 MMOL/L — SIGNIFICANT CHANGE UP (ref 135–145)
SPECIMEN SOURCE: SIGNIFICANT CHANGE UP
TOTAL CHOLESTEROL/HDL RATIO MEASUREMENT: 2.2 RATIO — LOW (ref 3.3–7.1)
TRIGL SERPL-MCNC: 36 MG/DL — SIGNIFICANT CHANGE UP (ref 10–149)
TSH SERPL-MCNC: 2.7 UU/ML — SIGNIFICANT CHANGE UP (ref 0.34–4.82)
VIT B12 SERPL-MCNC: 1440 PG/ML — HIGH (ref 232–1245)
WBC # BLD: 9.83 K/UL — SIGNIFICANT CHANGE UP (ref 3.8–10.5)
WBC # FLD AUTO: 9.83 K/UL — SIGNIFICANT CHANGE UP (ref 3.8–10.5)

## 2020-02-27 PROCEDURE — 71250 CT THORAX DX C-: CPT | Mod: 26

## 2020-02-27 PROCEDURE — 99232 SBSQ HOSP IP/OBS MODERATE 35: CPT

## 2020-02-27 RX ADMIN — AZITHROMYCIN 255 MILLIGRAM(S): 500 TABLET, FILM COATED ORAL at 17:19

## 2020-02-27 RX ADMIN — ENOXAPARIN SODIUM 40 MILLIGRAM(S): 100 INJECTION SUBCUTANEOUS at 12:02

## 2020-02-27 RX ADMIN — Medication 125 MILLIGRAM(S): at 22:50

## 2020-02-27 RX ADMIN — Medication 3 MILLILITER(S): at 02:26

## 2020-02-27 RX ADMIN — SODIUM CHLORIDE 4 MILLILITER(S): 9 INJECTION INTRAMUSCULAR; INTRAVENOUS; SUBCUTANEOUS at 21:31

## 2020-02-27 RX ADMIN — MONTELUKAST 10 MILLIGRAM(S): 4 TABLET, CHEWABLE ORAL at 12:02

## 2020-02-27 RX ADMIN — Medication 50 MICROGRAM(S): at 05:29

## 2020-02-27 RX ADMIN — Medication 3 MILLILITER(S): at 08:50

## 2020-02-27 RX ADMIN — ATORVASTATIN CALCIUM 20 MILLIGRAM(S): 80 TABLET, FILM COATED ORAL at 22:51

## 2020-02-27 RX ADMIN — Medication 3 MILLILITER(S): at 14:46

## 2020-02-27 RX ADMIN — SODIUM CHLORIDE 4 MILLILITER(S): 9 INJECTION INTRAMUSCULAR; INTRAVENOUS; SUBCUTANEOUS at 08:51

## 2020-02-27 RX ADMIN — CEFTRIAXONE 100 MILLIGRAM(S): 500 INJECTION, POWDER, FOR SOLUTION INTRAMUSCULAR; INTRAVENOUS at 17:19

## 2020-02-27 RX ADMIN — Medication 3 MILLILITER(S): at 21:13

## 2020-02-27 RX ADMIN — SODIUM CHLORIDE 4 MILLILITER(S): 9 INJECTION INTRAMUSCULAR; INTRAVENOUS; SUBCUTANEOUS at 14:47

## 2020-02-27 NOTE — DISCHARGE NOTE PROVIDER - HOSPITAL COURSE
Pt is an 80 y/o F, from home, ambulates independently,  with bronchiectasis, UTI, hypothyroidism, e.coli bacteremia,  presented with urinary hesitancy and incomplete voiding of urine since 4 days. Also complained of nausea and decreased appetite. In the ED, found to have right basilar infiltrate and started on IV antibiotics UA unimpressive, however given presenting symptoms, treated for UTI. CT chest with bilateral lower lobe and lingular pneumonia and small bilateral pleural effusions. Also found to be hyponatremic on admission, which corrected with IV hydration.         NOT COMPLETE Pt is an 80 y/o F, from home, ambulates independently,  with bronchiectasis, UTI, hypothyroidism, e.coli bacteremia,  presented with urinary hesitancy and incomplete voiding of urine since 4 days. Also complained of nausea and decreased appetite. In the ED, found to have right basilar infiltrate and started on IV antibiotics UA unimpressive, however given presenting symptoms, treated for UTI. CT chest with bilateral lower lobe and lingular pneumonia and small bilateral pleural effusions. Also found to be hyponatremic on admission, which corrected with IV hydration.     Patient followed by Pulmonology during hospital stay. Treated with course of intravenous antibiotics and steroids.      Patient declined home services.         Discharge planning as per attending Dr Zaldivar and pulmonology Dr Ramirez.     Plan to follow up outpatient with Primary Care Physician and Pulmonology. Pt is an 78 y/o F, from home, ambulates independently,  with bronchiectasis, UTI, hypothyroidism, e.coli bacteremia,  presented with urinary hesitancy and incomplete voiding of urine since 4 days. Also complained of nausea and decreased appetite. In the ED, found to have right basilar infiltrate and started on IV antibiotics UA unimpressive, however given presenting symptoms, treated for UTI. CT chest with bilateral lower lobe and lingular pneumonia and small bilateral pleural effusions. Also found to be hyponatremic on admission, which corrected with IV hydration.     Patient followed by Pulmonology during hospital stay. Treated with course of intravenous antibiotics and steroids.      Patient declined home services.         Discharge planning as per attending Dr Zaldivar and pulmonology Dr Ramirez.     Per pulmonology will discharge on Levaquin 500 mg x 5 days and prednisone taper dose every 3 days    Plan to follow up outpatient with Primary Care Physician and Pulmonology.

## 2020-02-27 NOTE — PROGRESS NOTE ADULT - PROBLEM SELECTOR PLAN 4
Na 127 --> 139   Corrected rapidly, no more fluids for now   Urine sodium 15mmol/L - urine may have been collected after correction of Na and is overly dilute   Daily BMPs

## 2020-02-27 NOTE — PROGRESS NOTE ADULT - PROBLEM SELECTOR PLAN 1
Afebrile, leukocytosis resolved WBC 15.1 --> 9.8   Procalcitonin elevated 0.53  Chest xray with acute right basilar infiltrate  Blood cultures testing  Continue rocephin and azithromycin (day 2)  Chest CT pending   Pulmonary Dr. Ramirez

## 2020-02-27 NOTE — PROGRESS NOTE ADULT - PROBLEM SELECTOR PLAN 3
h/o Bronchiectasis   Continue Duoneb with sodium chloride inhalation, bronchodilators, supplemental 02   On airway clearance vest at home BID, chest PT ordered  CT chest ordered

## 2020-02-27 NOTE — DISCHARGE NOTE PROVIDER - CARE PROVIDER_API CALL
Zack Zaldivar)  Medicine  [id number] [address] [place] [address], [address] [place] [address]  Phone: [phone]  Fax: [phone]  Follow Up Time:

## 2020-02-27 NOTE — PROGRESS NOTE ADULT - PROBLEM SELECTOR PLAN 2
Longstanding history of hesitancy and incomplete emptying of bladder  Urine cultures testing   Blood cultures testing   Continue rocephin day 2

## 2020-02-27 NOTE — DISCHARGE NOTE PROVIDER - NSDCCPCAREPLAN_GEN_ALL_CORE_FT
PRINCIPAL DISCHARGE DIAGNOSIS  Diagnosis: Pneumonia  Assessment and Plan of Treatment: You were found to have pneumonia and treated with IV antibiotics. Please complete your course with oral antibiotics. Seek medical attention if you develop fevers, chills, increased secretions or other signs of infection.      SECONDARY DISCHARGE DIAGNOSES  Diagnosis: Bronchiectasis  Assessment and Plan of Treatment: Continue your pulmonary medications and follow up with your pulmonologist.    Diagnosis: Hypothyroid  Assessment and Plan of Treatment: Continue taking synthroid and follow up with your primary care doctor.    Diagnosis: Hyponatremia  Assessment and Plan of Treatment: You were admitted with low sodium which has now resolved.

## 2020-02-27 NOTE — PROGRESS NOTE ADULT - SUBJECTIVE AND OBJECTIVE BOX
Time of Visit:  Patient seen and examined.     MEDICATIONS  (STANDING):  albuterol/ipratropium for Nebulization 3 milliLiter(s) Nebulizer every 6 hours  atorvastatin 20 milliGRAM(s) Oral at bedtime  azithromycin  IVPB 500 milliGRAM(s) IV Intermittent every 24 hours  cefTRIAXone   IVPB 1000 milliGRAM(s) IV Intermittent every 24 hours  enoxaparin Injectable 40 milliGRAM(s) SubCutaneous daily  levothyroxine 50 MICROGram(s) Oral daily  montelukast 10 milliGRAM(s) Oral daily  sodium chloride 0.9%. 1000 milliLiter(s) (90 mL/Hr) IV Continuous <Continuous>  sodium chloride 3%  Inhalation 4 milliLiter(s) Inhalation every 6 hours      MEDICATIONS  (PRN):       Medications up to date at time of exam.      PHYSICAL EXAMINATION:  Patient has no new complaints.  GENERAL: The patient is a well-developed, well-nourished, in no apparent distress.     Vital Signs Last 24 Hrs  T(C): 36.2 (2020 15:49), Max: 36.4 (2020 00:45)  T(F): 97.1 (2020 15:49), Max: 97.5 (2020 00:45)  HR: 85 (2020 15:49) (74 - 85)  BP: 112/52 (2020 15:49) (98/45 - 121/59)  BP(mean): --  RR: 16 (2020 15:49) (16 - 17)  SpO2: 98% (2020 15:49) (97% - 99%)   (if applicable)    Chest Tube (if applicable)    HEENT: Head is normocephalic and atraumatic. Extraocular muscles are intact. Mucous membranes are moist.     NECK: Supple, no palpable adenopathy.    LUNGS: Clear to auscultation, no wheezing, rales, or rhonchi.    HEART: Regular rate and rhythm without murmur.    ABDOMEN: Soft, nontender, and nondistended.  No hepatosplenomegaly is noted.    : No painful voiding, no pelvic pain    EXTREMITIES: Without any cyanosis, clubbing, rash, lesions or edema.    NEUROLOGIC: Awake, alert, oriented, grossly intact    SKIN: Warm, dry, good turgor.      LABS:                        9.9    9.83  )-----------( 230      ( 2020 05:46 )             31.1         139  |  108  |  12  ----------------------------<  91  3.7   |  25  |  0.85    Ca    8.1<L>      2020 05:46  Phos  2.8       Mg     2.5         TPro  7.3  /  Alb  3.3<L>  /  TBili  1.0  /  DBili  x   /  AST  32  /  ALT  51  /  AlkPhos  94      PT/INR - ( 2020 08:50 )   PT: 15.1 sec;   INR: 1.35 ratio         PTT - ( 2020 08:50 )  PTT:29.9 sec  Urinalysis Basic - ( 2020 08:42 )    Color: Yellow / Appearance: Clear / S.010 / pH: x  Gluc: x / Ketone: Small  / Bili: Negative / Urobili: Negative   Blood: x / Protein: 30 mg/dL / Nitrite: Negative   Leuk Esterase: Trace / RBC: 0-2 /HPF / WBC 3-5 /HPF   Sq Epi: x / Non Sq Epi: Few /HPF / Bacteria: Negative /HPF                  Procalcitonin, Serum: 0.53 ng/mL (20 @ 18:37)      MICROBIOLOGY: (if applicable)    RADIOLOGY & ADDITIONAL STUDIES:  EKG:   CXR:  ECHO:    IMPRESSION: 79y Female PAST MEDICAL & SURGICAL HISTORY:  E-coli UTI  Chronic bronchitis  Dyslipidemia  Pneumonia  Hypothyroid  S/P appendectomy   p/w           RECOMMENDATIONS: Time of Visit:  Patient seen and examined.     MEDICATIONS  (STANDING):  albuterol/ipratropium for Nebulization 3 milliLiter(s) Nebulizer every 6 hours  atorvastatin 20 milliGRAM(s) Oral at bedtime  azithromycin  IVPB 500 milliGRAM(s) IV Intermittent every 24 hours  cefTRIAXone   IVPB 1000 milliGRAM(s) IV Intermittent every 24 hours  enoxaparin Injectable 40 milliGRAM(s) SubCutaneous daily  levothyroxine 50 MICROGram(s) Oral daily  montelukast 10 milliGRAM(s) Oral daily  sodium chloride 0.9%. 1000 milliLiter(s) (90 mL/Hr) IV Continuous <Continuous>  sodium chloride 3%  Inhalation 4 milliLiter(s) Inhalation every 6 hours      MEDICATIONS  (PRN):       Medications up to date at time of exam.      PHYSICAL EXAMINATION:  Patient has no new complaints.  GENERAL: The patient is a well-developed, well-nourished, in no apparent distress.     Vital Signs Last 24 Hrs  T(C): 36.2 (2020 15:49), Max: 36.4 (2020 00:45)  T(F): 97.1 (2020 15:49), Max: 97.5 (2020 00:45)  HR: 85 (2020 15:49) (74 - 85)  BP: 112/52 (2020 15:49) (98/45 - 121/59)  BP(mean): --  RR: 16 (2020 15:49) (16 - 17)  SpO2: 98% (2020 15:49) (97% - 99%)   (if applicable)    Chest Tube (if applicable)    HEENT: Head is normocephalic and atraumatic. Extraocular muscles are intact. Mucous membranes are moist.     NECK: Supple, no palpable adenopathy.    LUNGS: Clear to auscultation, + b/l  rhonchi.    HEART: Regular rate and rhythm without murmur.    ABDOMEN: Soft, nontender, and nondistended.  No hepatosplenomegaly is noted.    : No painful voiding, no pelvic pain    EXTREMITIES: Without any cyanosis, clubbing, rash, lesions or edema.    NEUROLOGIC: Awake, alert, oriented, grossly intact    SKIN: Warm, dry, good turgor.      LABS:                        9.9    9.83  )-----------( 230      ( 2020 05:46 )             31.1         139  |  108  |  12  ----------------------------<  91  3.7   |  25  |  0.85    Ca    8.1<L>      2020 05:46  Phos  2.8       Mg     2.5         TPro  7.3  /  Alb  3.3<L>  /  TBili  1.0  /  DBili  x   /  AST  32  /  ALT  51  /  AlkPhos  94      PT/INR - ( 2020 08:50 )   PT: 15.1 sec;   INR: 1.35 ratio         PTT - ( 2020 08:50 )  PTT:29.9 sec  Urinalysis Basic - ( 2020 08:42 )    Color: Yellow / Appearance: Clear / S.010 / pH: x  Gluc: x / Ketone: Small  / Bili: Negative / Urobili: Negative   Blood: x / Protein: 30 mg/dL / Nitrite: Negative   Leuk Esterase: Trace / RBC: 0-2 /HPF / WBC 3-5 /HPF   Sq Epi: x / Non Sq Epi: Few /HPF / Bacteria: Negative /HPF                  Procalcitonin, Serum: 0.53 ng/mL (20 @ 18:37)      MICROBIOLOGY: (if applicable)    RADIOLOGY & ADDITIONAL STUDIES:  EKG:   CT chest;< from: CT Chest No Cont (20 @ 10:10) >    EXAM:  CT CHEST                            PROCEDURE DATE:  2020          INTERPRETATION:  CLINICAL INFORMATION: Short of breath    COMPARISON: None.    PROCEDURE:   CT of the Chest was performed without intravenous contrast.  Sagittal and coronal reformats were performed.      FINDINGS:    LUNGS AND AIRWAYS: Patent central airways.  Bilateral lower lobe airspace consolidation consistent with aspiration pneumonia. Small groundglass infiltrate in the lingula. Please image to resolution. Biapical scarring.    PLEURA: Small bilateral layering pleural effusions    MEDIASTINUM AND REANNA: Scattered borderline enlarged mediastinal lymph nodes likely reactive. Moderate hiatal hernia    VESSELS: Ectatic ascending thoracic aorta up to 4.2 cm similar to prior.    HEART: Cardiomegaly with coronary artery calcification. Trace pericardial effusion.    CHEST WALL AND LOWER NECK: Within normal limits.    VISUALIZED UPPER ABDOMEN: Within normal limits.    BONES: No acute or aggressive pathology    IMPRESSION:     Bilateral lower lobe and lingular pneumonia. Please image to resolution.  Small bilateral pleural effusions.  Additional findings as discussed                    CRISTIANO RIZO M.D., ATTENDING RADIOLOGIST  This document has been electronically signed. 2020 12:07PM                < end of copied text >    ECHO:    IMPRESSION: 79y Female PAST MEDICAL & SURGICAL HISTORY:  E-coli UTI  Chronic bronchitis  Dyslipidemia  Pneumonia  Hypothyroid  S/P appendectomy   p/w             Pt is an 80 y/o F, from home, ambulates independently,  with bronchiectasis, UTI, hypothyroidism, e.coli bacteremia,  presented with urinary hesitancy and incomplete voiding of urine since 4 days. According to the pt, since   ( 4 days back) she has been feeling incomplete voiding sensation and hesitancy. She stated that she has  been having decreased urination. She mentioned that her urine frequency is usually high. She does not have burning sensation while passing urine or dysuria. She also has nausea and decreased appetite since few days. Since same time period, she has been having shortness of breath which is worse on exertion. Denies orthopnea. She had fever yesterday with Tmax; 100.2 at home. She denies chest pain, cough, abdominal pain, recent sick contacts, recent travel and other complains except mentioned above.  CT chest reviewed     --    Patient presents with UTI, SOB, she has a pmhx of bronchiectasis and is currently being managed by Dr. Butler at [facility].  SOB likely 2/2 PNA and bronchiectasis    +leukocytosis 2/2 PNA, UTI  +bronchiectasis   +low grade fever    SUGGESTION:   - airway clearance vest   - bronchodilators, o2 supp prn    - will start solumedrol    - RVP, bcx   - con't w/ abx   - DVT and GI prophylaxis.

## 2020-02-27 NOTE — PROGRESS NOTE ADULT - SUBJECTIVE AND OBJECTIVE BOX
NP Note discussed with  Primary Attending    Pt is an 78 y/o F, from home, ambulates independently,  with bronchiectasis, UTI, hypothyroidism, e.coli bacteremia,  presented with urinary hesitancy and incomplete voiding of urine since 4 days. Also complained of nausea and decreased appetite In the ED, found to have right basilar infiltrate and started on IV antibiotics UA unimpressive, however given presenting symptoms, treated for UTI. Plan for CT chest.     INTERVAL HPI/OVERNIGHT EVENTS: nausea resolved, now tolerating diet. Urinary symptoms hesitancy and frequency) improving slightly    MEDICATIONS  (STANDING):  albuterol/ipratropium for Nebulization 3 milliLiter(s) Nebulizer every 6 hours  atorvastatin 20 milliGRAM(s) Oral at bedtime  azithromycin  IVPB 500 milliGRAM(s) IV Intermittent every 24 hours  cefTRIAXone   IVPB 1000 milliGRAM(s) IV Intermittent every 24 hours  enoxaparin Injectable 40 milliGRAM(s) SubCutaneous daily  levothyroxine 50 MICROGram(s) Oral daily  montelukast 10 milliGRAM(s) Oral daily  sodium chloride 0.9%. 1000 milliLiter(s) (90 mL/Hr) IV Continuous <Continuous>  sodium chloride 3%  Inhalation 4 milliLiter(s) Inhalation every 6 hours    MEDICATIONS  (PRN):  __________________________________________________  REVIEW OF SYSTEMS:  CONSTITUTIONAL: No fever,   EYES: no acute visual disturbances  NECK: No pain or stiffness  RESPIRATORY: No cough; No shortness of breath  CARDIOVASCULAR: No chest pain, no palpitations  GASTROINTESTINAL: No pain. No nausea or vomiting; No diarrhea   NEUROLOGICAL: No headache or numbness, no tremors  MUSCULOSKELETAL: No joint pain, no muscle pain  GENITOURINARY: no dysuria, no frequency, no hesitancy  PSYCHIATRY: no depression , no anxiety  ALL OTHER  ROS negative        Vital Signs Last 24 Hrs  T(C): 36.3 (2020 07:33), Max: 36.9 (2020 11:46)  T(F): 97.3 (2020 07:33), Max: 98.4 (2020 11:46)  HR: 78 (2020 07:33) (74 - 79)  BP: 121/59 (2020 07:33) (98/45 - 121/59)  BP(mean): --  RR: 17 (2020 07:33) (16 - 17)  SpO2: 99% (2020 07:33) (95% - 99%)    ________________________________________________  PHYSICAL EXAM:  GENERAL: NAD  HEENT: Normocephalic;  conjunctivae and sclerae clear; moist mucous membranes;   NECK : supple  CHEST/LUNG: Occasional wheezes   HEART: S1 S2  regular; no murmurs, gallops or rubs  ABDOMEN: Soft, Nontender, Nondistended; Bowel sounds present  EXTREMITIES: no cyanosis; no edema; no calf tenderness  SKIN: warm and dry; no rash  NERVOUS SYSTEM:  Awake and alert; Oriented  to place, person and time ; no new deficits    _________________________________________________  LABS:                        9.9    9.83  )-----------( 230      ( 2020 05:46 )             31.1         139  |  108  |  12  ----------------------------<  91  3.7   |  25  |  0.85    Ca    8.1<L>      2020 05:46  Phos  2.8       Mg     2.5         TPro  7.3  /  Alb  3.3<L>  /  TBili  1.0  /  DBili  x   /  AST  32  /  ALT  51  /  AlkPhos  94      PT/INR - ( 2020 08:50 )   PT: 15.1 sec;   INR: 1.35 ratio         PTT - ( 2020 08:50 )  PTT:29.9 sec  Urinalysis Basic - ( 2020 08:42 )    Color: Yellow / Appearance: Clear / S.010 / pH: x  Gluc: x / Ketone: Small  / Bili: Negative / Urobili: Negative   Blood: x / Protein: 30 mg/dL / Nitrite: Negative   Leuk Esterase: Trace / RBC: 0-2 /HPF / WBC 3-5 /HPF   Sq Epi: x / Non Sq Epi: Few /HPF / Bacteria: Negative /HPF      CAPILLARY BLOOD GLUCOSE      RADIOLOGY & ADDITIONAL TESTS:    < from: Xray Chest 2 Views PA/Lat (20 @ 09:31) >  IMPRESSION:    Acute right basilar infiltrate. Trace bilateral pleural effusions.    Right apical lung nodule versus artifact related to overlying soft tissue. Recommend repeat study.    < end of copied text >    Imaging  Reviewed:  YES  Consultant(s) Notes Reviewed:   YES    Plan of care was discussed with patient and /or primary care giver; all questions and concerns were addressed

## 2020-02-27 NOTE — DISCHARGE NOTE PROVIDER - NSDCMRMEDTOKEN_GEN_ALL_CORE_FT
albuterol 90 mcg/inh inhalation aerosol: 1 puff(s) inhaled every 4 hours  atorvastatin 20 mg oral tablet: 1 tab(s) orally once a day  azelastine 137 mcg/inh (0.1%) nasal spray: 2 spray(s) nasal 2 times a day  azelastine nasal:   fluticasone 50 mcg inhalation powder: 1 puff(s) inhaled 2 times a day  montelukast 10 mg oral tablet: 1 tab(s) orally once a day  Sodium Chloride, Inhalation 7% inhalation solution: inhaled 4 times a day with nebulizer  Synthroid 50 mcg (0.05 mg) oral tablet: 1 tab(s) orally once a day Acapella device: 99 months  Use 6 times daily  albuterol 90 mcg/inh inhalation aerosol: 1 puff(s) inhaled every 4 hours  atorvastatin 20 mg oral tablet: 1 tab(s) orally once a day  azelastine 137 mcg/inh (0.1%) nasal spray: 2 spray(s) nasal 2 times a day  azelastine nasal:   fluticasone 50 mcg inhalation powder: 1 puff(s) inhaled 2 times a day  montelukast 10 mg oral tablet: 1 tab(s) orally once a day  Sodium Chloride, Inhalation 7% inhalation solution: inhaled 4 times a day with nebulizer  Synthroid 50 mcg (0.05 mg) oral tablet: 1 tab(s) orally once a day Acapella device: 99 months  Use 6 times daily  albuterol 90 mcg/inh inhalation aerosol: 1 puff(s) inhaled every 4 hours  atorvastatin 20 mg oral tablet: 1 tab(s) orally once a day  azelastine 137 mcg/inh (0.1%) nasal spray: 2 spray(s) nasal 2 times a day  azelastine nasal:   fluticasone 50 mcg inhalation powder: 1 puff(s) inhaled 2 times a day  Levaquin 500 mg oral tablet: 1 tab(s) orally every 24 hours   for infection  montelukast 10 mg oral tablet: 1 tab(s) orally once a day  predniSONE 10 mg oral tablet: 4 tab(s) oral - orally once a day x 3 days  3 tab(s) oral - orally once a day x 3 days  2 tab(s) oral - orally once a day x 3 days  1 tab(s) oral - orally once a day x 3 days  Sodium Chloride, Inhalation 7% inhalation solution: inhaled 4 times a day with nebulizer  Synthroid 50 mcg (0.05 mg) oral tablet: 1 tab(s) orally once a day

## 2020-02-28 DIAGNOSIS — Z02.9 ENCOUNTER FOR ADMINISTRATIVE EXAMINATIONS, UNSPECIFIED: ICD-10-CM

## 2020-02-28 DIAGNOSIS — Z71.89 OTHER SPECIFIED COUNSELING: ICD-10-CM

## 2020-02-28 DIAGNOSIS — J69.0 PNEUMONITIS DUE TO INHALATION OF FOOD AND VOMIT: ICD-10-CM

## 2020-02-28 LAB
ANION GAP SERPL CALC-SCNC: 9 MMOL/L — SIGNIFICANT CHANGE UP (ref 5–17)
BUN SERPL-MCNC: 12 MG/DL — SIGNIFICANT CHANGE UP (ref 7–18)
CALCIUM SERPL-MCNC: 9.3 MG/DL — SIGNIFICANT CHANGE UP (ref 8.4–10.5)
CHLORIDE SERPL-SCNC: 104 MMOL/L — SIGNIFICANT CHANGE UP (ref 96–108)
CO2 SERPL-SCNC: 25 MMOL/L — SIGNIFICANT CHANGE UP (ref 22–31)
CREAT SERPL-MCNC: 0.86 MG/DL — SIGNIFICANT CHANGE UP (ref 0.5–1.3)
GLUCOSE SERPL-MCNC: 170 MG/DL — HIGH (ref 70–99)
HCT VFR BLD CALC: 39.9 % — SIGNIFICANT CHANGE UP (ref 34.5–45)
HGB BLD-MCNC: 12.9 G/DL — SIGNIFICANT CHANGE UP (ref 11.5–15.5)
MCHC RBC-ENTMCNC: 31.3 PG — SIGNIFICANT CHANGE UP (ref 27–34)
MCHC RBC-ENTMCNC: 32.3 GM/DL — SIGNIFICANT CHANGE UP (ref 32–36)
MCV RBC AUTO: 96.8 FL — SIGNIFICANT CHANGE UP (ref 80–100)
NRBC # BLD: 0 /100 WBCS — SIGNIFICANT CHANGE UP (ref 0–0)
PLATELET # BLD AUTO: 323 K/UL — SIGNIFICANT CHANGE UP (ref 150–400)
POTASSIUM SERPL-MCNC: 3.9 MMOL/L — SIGNIFICANT CHANGE UP (ref 3.5–5.3)
POTASSIUM SERPL-SCNC: 3.9 MMOL/L — SIGNIFICANT CHANGE UP (ref 3.5–5.3)
RBC # BLD: 4.12 M/UL — SIGNIFICANT CHANGE UP (ref 3.8–5.2)
RBC # FLD: 13.9 % — SIGNIFICANT CHANGE UP (ref 10.3–14.5)
SODIUM SERPL-SCNC: 138 MMOL/L — SIGNIFICANT CHANGE UP (ref 135–145)
WBC # BLD: 6.35 K/UL — SIGNIFICANT CHANGE UP (ref 3.8–10.5)
WBC # FLD AUTO: 6.35 K/UL — SIGNIFICANT CHANGE UP (ref 3.8–10.5)

## 2020-02-28 RX ORDER — SODIUM CHLORIDE 9 MG/ML
4 INJECTION INTRAMUSCULAR; INTRAVENOUS; SUBCUTANEOUS EVERY 6 HOURS
Refills: 0 | Status: DISCONTINUED | OUTPATIENT
Start: 2020-02-28 | End: 2020-03-01

## 2020-02-28 RX ORDER — LANOLIN ALCOHOL/MO/W.PET/CERES
3 CREAM (GRAM) TOPICAL AT BEDTIME
Refills: 0 | Status: DISCONTINUED | OUTPATIENT
Start: 2020-02-28 | End: 2020-03-01

## 2020-02-28 RX ORDER — POLYETHYLENE GLYCOL 3350 17 G/17G
17 POWDER, FOR SOLUTION ORAL DAILY
Refills: 0 | Status: DISCONTINUED | OUTPATIENT
Start: 2020-02-28 | End: 2020-03-01

## 2020-02-28 RX ADMIN — Medication 3 MILLILITER(S): at 08:48

## 2020-02-28 RX ADMIN — ENOXAPARIN SODIUM 40 MILLIGRAM(S): 100 INJECTION SUBCUTANEOUS at 12:44

## 2020-02-28 RX ADMIN — AZITHROMYCIN 255 MILLIGRAM(S): 500 TABLET, FILM COATED ORAL at 17:06

## 2020-02-28 RX ADMIN — Medication 40 MILLIGRAM(S): at 21:34

## 2020-02-28 RX ADMIN — Medication 40 MILLIGRAM(S): at 05:53

## 2020-02-28 RX ADMIN — SODIUM CHLORIDE 4 MILLILITER(S): 9 INJECTION INTRAMUSCULAR; INTRAVENOUS; SUBCUTANEOUS at 03:48

## 2020-02-28 RX ADMIN — ATORVASTATIN CALCIUM 20 MILLIGRAM(S): 80 TABLET, FILM COATED ORAL at 21:33

## 2020-02-28 RX ADMIN — Medication 40 MILLIGRAM(S): at 14:28

## 2020-02-28 RX ADMIN — SODIUM CHLORIDE 4 MILLILITER(S): 9 INJECTION INTRAMUSCULAR; INTRAVENOUS; SUBCUTANEOUS at 22:31

## 2020-02-28 RX ADMIN — Medication 3 MILLILITER(S): at 15:10

## 2020-02-28 RX ADMIN — POLYETHYLENE GLYCOL 3350 17 GRAM(S): 17 POWDER, FOR SOLUTION ORAL at 12:44

## 2020-02-28 RX ADMIN — Medication 50 MICROGRAM(S): at 05:53

## 2020-02-28 RX ADMIN — Medication 3 MILLIGRAM(S): at 23:24

## 2020-02-28 RX ADMIN — Medication 3 MILLILITER(S): at 03:48

## 2020-02-28 RX ADMIN — Medication 3 MILLILITER(S): at 21:27

## 2020-02-28 RX ADMIN — MONTELUKAST 10 MILLIGRAM(S): 4 TABLET, CHEWABLE ORAL at 13:53

## 2020-02-28 RX ADMIN — CEFTRIAXONE 100 MILLIGRAM(S): 500 INJECTION, POWDER, FOR SOLUTION INTRAMUSCULAR; INTRAVENOUS at 17:06

## 2020-02-28 RX ADMIN — SODIUM CHLORIDE 4 MILLILITER(S): 9 INJECTION INTRAMUSCULAR; INTRAVENOUS; SUBCUTANEOUS at 08:49

## 2020-02-28 NOTE — PROGRESS NOTE ADULT - SUBJECTIVE AND OBJECTIVE BOX
HPI- Pt is an 78 y/o F, from home, ambulates independently,  with bronchiectasis, hypothyroidism, hx of  UTI, e.coli bacteremia,  presented with urinary hesitancy and incomplete voiding of urine since 4 days.  Pt also reports nausea and decreased appetite for several days prior to this admission.  Since same time period, she has been having shortness of breath which is worse on exertion and fever 100.2F at home. CT and CXR result as below. Pt was admitted for aspiration pneumonia.  UA is negative but pt is symptomatic. started Rocephin and Zithromax. Preliminary cultures NGTD. Pulmonary following for worsening sob with chest tightness. on IV steroid.         INTERVAL HPI/OVERNIGHT EVENTS: c/o sob on exertion, chest tightness on coughing. denies fever, dizziness, palpitation. c/o dysuria     MEDICATIONS  (STANDING):  albuterol/ipratropium for Nebulization 3 milliLiter(s) Nebulizer every 6 hours  atorvastatin 20 milliGRAM(s) Oral at bedtime  azithromycin  IVPB 500 milliGRAM(s) IV Intermittent every 24 hours  cefTRIAXone   IVPB 1000 milliGRAM(s) IV Intermittent every 24 hours  enoxaparin Injectable 40 milliGRAM(s) SubCutaneous daily  levothyroxine 50 MICROGram(s) Oral daily  melatonin 3 milliGRAM(s) Oral at bedtime  methylPREDNISolone sodium succinate Injectable 40 milliGRAM(s) IV Push every 8 hours  montelukast 10 milliGRAM(s) Oral daily  polyethylene glycol 3350 17 Gram(s) Oral daily  sodium chloride 0.9%. 1000 milliLiter(s) (90 mL/Hr) IV Continuous <Continuous>    MEDICATIONS  (PRN):      __________________________________________________  REVIEW OF SYSTEMS:    CONSTITUTIONAL: No fever,   EYES: no acute visual disturbances  NECK: No pain or stiffness  RESPIRATORY: + cough; No shortness of breath, tightness on coughing  CARDIOVASCULAR: No chest pain, no palpitations  GASTROINTESTINAL: No pain. No nausea or vomiting; No diarrhea   NEUROLOGICAL: No headache or numbness, no tremors  MUSCULOSKELETAL: No joint pain, no muscle pain  GENITOURINARY: no dysuria, no frequency, no hesitancy  PSYCHIATRY: no depression , no anxiety  ALL OTHER  ROS negative        Vital Signs Last 24 Hrs  T(C): 36.3 (28 Feb 2020 07:49), Max: 36.7 (28 Feb 2020 00:41)  T(F): 97.4 (28 Feb 2020 07:49), Max: 98.1 (28 Feb 2020 00:41)  HR: 80 (28 Feb 2020 09:03) (78 - 85)  BP: 126/68 (28 Feb 2020 07:49) (112/52 - 126/68)  BP(mean): --  RR: 18 (28 Feb 2020 14:25) (16 - 18)  SpO2: 93% (28 Feb 2020 14:25) (91% - 98%)    ________________________________________________  PHYSICAL EXAM:  GENERAL: NAD, conversant. well developed. ambulatory independently.  HEENT: Normocephalic;  conjunctivae and sclerae clear; moist mucous membranes;   NECK : supple  CHEST/LUNG: wheezing b/l on expiratory effort. Eupneic on oxygen.   HEART: S1 S2  regular; no murmurs, gallops or rubs  ABDOMEN: Soft, Nontender, Nondistended; Bowel sounds present  EXTREMITIES: no cyanosis; no edema; no calf tenderness  SKIN: warm and dry; no rash  NERVOUS SYSTEM:  Awake and alert; Oriented  to place, person and time     _________________________________________________  LABS:                        12.9   6.35  )-----------( 323      ( 28 Feb 2020 06:49 )             39.9     02-28    138  |  104  |  12  ----------------------------<  170<H>  3.9   |  25  |  0.86    Ca    9.3      28 Feb 2020 06:49  Phos  2.8     02-27  Mg     2.5     02-27          CAPILLARY BLOOD GLUCOSE            RADIOLOGY & ADDITIONAL TESTS:    Imaging  Reviewed:  YES    < from: CT Chest No Cont (02.27.20 @ 10:10) >    EXAM:  CT CHEST                            PROCEDURE DATE:  02/27/2020          INTERPRETATION:  CLINICAL INFORMATION: Short of breath    COMPARISON: None.    PROCEDURE:   CT of the Chest was performed without intravenous contrast.  Sagittal and coronal reformats were performed.      FINDINGS:    LUNGS AND AIRWAYS: Patent central airways.  Bilateral lower lobe airspace consolidation consistent with aspiration pneumonia. Small groundglass infiltrate in the lingula. Please image to resolution. Biapical scarring.    PLEURA: Small bilateral layering pleural effusions    MEDIASTINUM AND REANNA: Scattered borderline enlarged mediastinal lymph nodes likely reactive. Moderate hiatal hernia    VESSELS: Ectatic ascending thoracic aorta up to 4.2 cm similar to prior.    HEART: Cardiomegaly with coronary artery calcification. Trace pericardial effusion.    CHEST WALL AND LOWER NECK: Within normal limits.    VISUALIZED UPPER ABDOMEN: Within normal limits.    BONES: No acute or aggressive pathology    IMPRESSION:     Bilateral lower lobe and lingular pneumonia. Please image to resolution.  Small bilateral pleural effusions.  Additional findings as discussed                    CRISTIANO RIZO M.D., ATTENDING RADIOLOGIST  This document has been electronically signed. Feb 27 2020 12:07PM                < end of copied text >      < from: Xray Chest 2 Views PA/Lat (02.26.20 @ 09:31) >    EXAM:  XR CHEST PA LAT 2V                            PROCEDURE DATE:  02/26/2020          INTERPRETATION:  CLINICAL INDICATION: 79 years  Female with wheezing, fever.    COMPARISON: 2/28/2019    AP view of the chest demonstrates a right basilar infiltrate. There is a questionable 1 cm nodule versus overlying artifact at the right lung apex. The left lung is clear. There are trace bilateral pleural effusions.. There is no pneumothorax.    The heart is normal in size. There is no mediastinal or hilar mass.     The pulmonary vasculature is normal.     Mild thoracic degenerative changes and marked scoliosis are present.    IMPRESSION:    Acute right basilar infiltrate. Trace bilateral pleural effusions.    Right apical lung nodule versus artifact related to overlying soft tissue. Recommend repeat study.                IMAN POOL M.D., ATTENDING RADIOLOGIST  This document has been electronically signed. Feb 26 2020 10:03AM                < end of copied text >      Consultant(s) Notes Reviewed:   YES    Pulmonary    Plan of care was discussed with patient and /or primary care giver; all questions and concerns were addressed

## 2020-02-28 NOTE — PROGRESS NOTE ADULT - PROBLEM SELECTOR PLAN 3
long history  Continue Duoneb, bronchodilators, supplemental 02   c/w airway clearance vest   BID and chest PT.  f/u pulmonary

## 2020-02-28 NOTE — PROGRESS NOTE ADULT - PROBLEM SELECTOR PLAN 1
CT, CXR as above  afebrile. no leukocytosis  pt still reports sob, chest tightness  c/w IV Zithromax and Rocephin #3.   continue IV steroid q8 x 3 days (day 2) then taper  f/u pulmonary dr. Ramirez.

## 2020-02-28 NOTE — PROGRESS NOTE ADULT - SUBJECTIVE AND OBJECTIVE BOX
Time of Visit:  Patient seen and examined.     MEDICATIONS  (STANDING):  albuterol/ipratropium for Nebulization 3 milliLiter(s) Nebulizer every 6 hours  atorvastatin 20 milliGRAM(s) Oral at bedtime  azithromycin  IVPB 500 milliGRAM(s) IV Intermittent every 24 hours  cefTRIAXone   IVPB 1000 milliGRAM(s) IV Intermittent every 24 hours  enoxaparin Injectable 40 milliGRAM(s) SubCutaneous daily  levothyroxine 50 MICROGram(s) Oral daily  melatonin 3 milliGRAM(s) Oral at bedtime  methylPREDNISolone sodium succinate Injectable 40 milliGRAM(s) IV Push every 8 hours  montelukast 10 milliGRAM(s) Oral daily  polyethylene glycol 3350 17 Gram(s) Oral daily  sodium chloride 0.9%. 1000 milliLiter(s) (90 mL/Hr) IV Continuous <Continuous>      MEDICATIONS  (PRN):       Medications up to date at time of exam.    ROS; No fever, chills, cough, congestion.   PHYSICAL EXAMINATION:      Vital Signs Last 24 Hrs  T(C): 36.3 (28 Feb 2020 07:49), Max: 36.7 (28 Feb 2020 00:41)  T(F): 97.4 (28 Feb 2020 07:49), Max: 98.1 (28 Feb 2020 00:41)  HR: 80 (28 Feb 2020 09:03) (74 - 85)  BP: 126/68 (28 Feb 2020 07:49) (112/52 - 126/68)  BP(mean): --  RR: 18 (28 Feb 2020 14:25) (16 - 18)  SpO2: 93% (28 Feb 2020 14:25) (91% - 99%)   (if applicable)    General: Alert and oriented. No acute distress. Able to answer question with no SOB.     HEENT: Head is normocephalic and atraumatic. No nasal tenderness. Extraocular muscles are intact. Mucous membranes are moist.     NECK: Supple, no palpable adenopathy.    LUNGS: Good air entrance. Bilateral rhonchi. No use of accessory muscle.      HEART: S1 S2 Regular rate and no click/ rub.     ABDOMEN: Soft, nontender, and nondistended.  No abdominal guarding. Active bowel sounds.     EXTREMITIES: Without any cyanosis, clubbing, rash, lesions or edema.    NEUROLOGIC: Awake, alert, oriented.     SKIN: Warm and moist. Non diaphoretic.       LABS:                        12.9   6.35  )-----------( 323      ( 28 Feb 2020 06:49 )             39.9     02-28    138  |  104  |  12  ----------------------------<  170<H>  3.9   |  25  |  0.86    Ca    9.3      28 Feb 2020 06:49  Phos  2.8     02-27  Mg     2.5     02-27    Procalcitonin, Serum: 0.53 ng/mL (02-26-20 @ 18:37)      MICROBIOLOGY: (if applicable)    RADIOLOGY & ADDITIONAL STUDIES:  EKG:   C T chest: < from: CT Chest No Cont (02.27.20 @ 10:10) >    EXAM:  CT CHEST                            PROCEDURE DATE:  02/27/2020          INTERPRETATION:  CLINICAL INFORMATION: Short of breath    COMPARISON: None.    PROCEDURE:   CT of the Chest was performed without intravenous contrast.  Sagittal and coronal reformats were performed.      FINDINGS:    LUNGS AND AIRWAYS: Patent central airways.  Bilateral lower lobe airspace consolidation consistent with aspiration pneumonia. Small groundglass infiltrate in the lingula. Please image to resolution. Biapical scarring.    PLEURA: Small bilateral layering pleural effusions    MEDIASTINUM AND REANNA: Scattered borderline enlarged mediastinal lymph nodes likely reactive. Moderate hiatal hernia    VESSELS: Ectatic ascending thoracic aorta up to 4.2 cm similar to prior.    HEART: Cardiomegaly with coronary artery calcification. Trace pericardial effusion.    CHEST WALL AND LOWER NECK: Within normal limits.    VISUALIZED UPPER ABDOMEN: Within normal limits.    BONES: No acute or aggressive pathology    IMPRESSION:     Bilateral lower lobe and lingular pneumonia. Please image to resolution.  Small bilateral pleural effusions.      IMPRESSION: 79y Female PAST MEDICAL & SURGICAL HISTORY:  E-coli UTI  Chronic bronchitis  Dyslipidemia  Pneumonia  Hypothyroid  S/P appendectomy     Impression; 80 Y/O Female presented with UTI, SOB, she has a pmhx of bronchiectasis and is currently being managed by Dr. Butler at [facility]. Episode of SOB likely 2/2 PNA and bronchiectasis. Leukocytosis improved latest WBC 6.35.  BLD Cx x 2 Negative.     Suggestion;  O2 saturation 92%-94% room air. Can have Oxygen supplementation 2L NC if needed.   Continue Duoneb Q 6 hours. via nebulization .  Continue Solumedrol 40 mg Q 8 hours then taper after 3 days.   Continue antibiotic Daily.   Continue Singulair 10 mg oral daily.   Pulmonary oral hygiene care.  Has airway clearance Vest at home, patient inquiring to have portable ( smaller ) , not sure if insurance will cover for new one.   DVT/ GI prophylactic .

## 2020-02-28 NOTE — PROGRESS NOTE ADULT - SUBJECTIVE AND OBJECTIVE BOX
Patient is a 79y old  Female who presents with a chief complaint of Urinary hesitancy, shortness of breath, fever (28 Feb 2020 11:36)      INTERVAL HPI/OVERNIGHT EVENTS: patient  feels  comfortable  c/o sob and  fatigue  on exertion  CT chest reviewed   T(C): 36.3 (02-28-20 @ 07:49), Max: 36.7 (02-28-20 @ 00:41)  HR: 80 (02-28-20 @ 09:03) (78 - 85)  BP: 126/68 (02-28-20 @ 07:49) (112/52 - 126/68)  RR: 18 (02-28-20 @ 14:25) (16 - 18)  SpO2: 93% (02-28-20 @ 14:25) (91% - 98%)  Wt(kg): --Vital Signs Last 24 Hrs  T(C): 36.3 (28 Feb 2020 07:49), Max: 36.7 (28 Feb 2020 00:41)  T(F): 97.4 (28 Feb 2020 07:49), Max: 98.1 (28 Feb 2020 00:41)  HR: 80 (28 Feb 2020 09:03) (78 - 85)  BP: 126/68 (28 Feb 2020 07:49) (112/52 - 126/68)  BP(mean): --  RR: 18 (28 Feb 2020 14:25) (16 - 18)  SpO2: 93% (28 Feb 2020 14:25) (91% - 98%)  I&O's Summary      LABS:                        12.9   6.35  )-----------( 323      ( 28 Feb 2020 06:49 )             39.9     02-28    138  |  104  |  12  ----------------------------<  170<H>  3.9   |  25  |  0.86    Ca    9.3      28 Feb 2020 06:49  Phos  2.8     02-27  Mg     2.5     02-27          CAPILLARY BLOOD GLUCOSE    MEDICATIONS  (STANDING):  albuterol/ipratropium for Nebulization 3 milliLiter(s) Nebulizer every 6 hours  atorvastatin 20 milliGRAM(s) Oral at bedtime  azithromycin  IVPB 500 milliGRAM(s) IV Intermittent every 24 hours  cefTRIAXone   IVPB 1000 milliGRAM(s) IV Intermittent every 24 hours  enoxaparin Injectable 40 milliGRAM(s) SubCutaneous daily  levothyroxine 50 MICROGram(s) Oral daily  melatonin 3 milliGRAM(s) Oral at bedtime  methylPREDNISolone sodium succinate Injectable 40 milliGRAM(s) IV Push every 8 hours  montelukast 10 milliGRAM(s) Oral daily  polyethylene glycol 3350 17 Gram(s) Oral daily  sodium chloride 0.9%. 1000 milliLiter(s) (90 mL/Hr) IV Continuous <Continuous>    MEDICATIONS  (PRN):            REVIEW OF SYSTEMS:  CONSTITUTIONAL: + fever, weight loss, or fatigue  EYES: No eye pain, visual disturbances, or discharge  ENMT:  No difficulty hearing, tinnitus, vertigo; No sinus or throat pain  NECK: No pain or stiffness  BREASTS: No pain, masses, or nipple discharge  RESPIRATORY: + cough, wheezing, chills or hemoptysis; + shortness of breath  CARDIOVASCULAR: No chest pain, palpitations, dizziness, or leg swelling  GASTROINTESTINAL: No abdominal or epigastric pain. No nausea, vomiting, or hematemesis; No diarrhea or constipation. No melena or hematochezia.  GENITOURINARY: No dysuria, frequency, hematuria, or incontinence  NEUROLOGICAL: No headaches, memory loss, loss of strength, numbness, or tremors  SKIN: No itching, burning, rashes, or lesions   LYMPH NODES: No enlarged glands  ENDOCRINE: No heat or cold intolerance; No hair loss  MUSCULOSKELETAL: No joint pain or swelling; No muscle, back, or extremity pain  PSYCHIATRIC: No depression, anxiety, mood swings, or difficulty sleeping  HEME/LYMPH: No easy bruising, or bleeding gums  ALLERGY AND IMMUNOLOGIC: No hives or eczema    RADIOLOGY & ADDITIONAL TESTS:    Imaging Personally Reviewed:  [ ] YES  [ ] NO    Consultant(s) Notes Reviewed:  [x ] YES  [ ] NO    PHYSICAL EXAM:  GENERAL: NAD, thin build  elderly lady  HEAD:  Atraumatic, Normocephalic  EYES: EOMI, PERRLA, conjunctiva and sclera clear  ENMT: No tonsillar erythema, exudates, or enlargement; Moist mucous membranes, Good dentition, No lesions  NECK: Supple, No JVD, Normal thyroid  NERVOUS SYSTEM:  Alert & Oriented X3, Good concentration; Motor Strength 5/5 B/L upper and lower extremities; DTRs 2+ intact and symmetric  CHEST/LUNG: B/l , rhonchi, wheezing, no rubs  HEART: Regular rate and rhythm; No murmurs, rubs, or gallops  ABDOMEN: Soft, Nontender, Nondistended; Bowel sounds present  EXTREMITIES:  2+ Peripheral Pulses, No clubbing, cyanosis, or edema  LYMPH: No lymphadenopathy noted  SKIN: No rashes or lesions    Care Discussed with Consultants/Other Providers [ x] YES  [ ] NO

## 2020-02-28 NOTE — PROGRESS NOTE ADULT - PROBLEM SELECTOR PLAN 2
UA neg. but symptomatic of dysuria. prelim. Ucx negative  educate genital hygiene.   c/w rocephin day 3

## 2020-02-29 RX ADMIN — Medication 3 MILLILITER(S): at 09:39

## 2020-02-29 RX ADMIN — ATORVASTATIN CALCIUM 20 MILLIGRAM(S): 80 TABLET, FILM COATED ORAL at 21:13

## 2020-02-29 RX ADMIN — Medication 3 MILLILITER(S): at 15:30

## 2020-02-29 RX ADMIN — SODIUM CHLORIDE 4 MILLILITER(S): 9 INJECTION INTRAMUSCULAR; INTRAVENOUS; SUBCUTANEOUS at 21:24

## 2020-02-29 RX ADMIN — ENOXAPARIN SODIUM 40 MILLIGRAM(S): 100 INJECTION SUBCUTANEOUS at 11:41

## 2020-02-29 RX ADMIN — Medication 3 MILLILITER(S): at 02:17

## 2020-02-29 RX ADMIN — Medication 40 MILLIGRAM(S): at 21:13

## 2020-02-29 RX ADMIN — CEFTRIAXONE 100 MILLIGRAM(S): 500 INJECTION, POWDER, FOR SOLUTION INTRAMUSCULAR; INTRAVENOUS at 14:34

## 2020-02-29 RX ADMIN — SODIUM CHLORIDE 4 MILLILITER(S): 9 INJECTION INTRAMUSCULAR; INTRAVENOUS; SUBCUTANEOUS at 09:39

## 2020-02-29 RX ADMIN — MONTELUKAST 10 MILLIGRAM(S): 4 TABLET, CHEWABLE ORAL at 11:40

## 2020-02-29 RX ADMIN — Medication 50 MICROGRAM(S): at 06:34

## 2020-02-29 RX ADMIN — Medication 40 MILLIGRAM(S): at 14:33

## 2020-02-29 RX ADMIN — Medication 3 MILLILITER(S): at 21:23

## 2020-02-29 RX ADMIN — AZITHROMYCIN 255 MILLIGRAM(S): 500 TABLET, FILM COATED ORAL at 17:55

## 2020-02-29 RX ADMIN — Medication 40 MILLIGRAM(S): at 06:34

## 2020-02-29 RX ADMIN — SODIUM CHLORIDE 4 MILLILITER(S): 9 INJECTION INTRAMUSCULAR; INTRAVENOUS; SUBCUTANEOUS at 15:30

## 2020-02-29 RX ADMIN — SODIUM CHLORIDE 4 MILLILITER(S): 9 INJECTION INTRAMUSCULAR; INTRAVENOUS; SUBCUTANEOUS at 02:17

## 2020-02-29 NOTE — CHART NOTE - NSCHARTNOTEFT_GEN_A_CORE
Upon Nutritional Assessment by the Registered Dietitian your patient was determined to meet criteria / has evidence of the following diagnosis/diagnoses:          [ ]  Mild Protein Calorie Malnutrition        [x ]  Moderate Protein Calorie Malnutrition        [ ] Severe Protein Calorie Malnutrition        [ ] Unspecified Protein Calorie Malnutrition        [x ] Underweight / BMI <19        [ ] Morbid Obesity / BMI > 40      Findings as based on:  •  Comprehensive nutrition assessment and consultation  •  Calorie counts (nutrient intake analysis)  •  Food acceptance and intake status from observations by staff  •  Follow up  •  Patient education  •  Intervention secondary to interdisciplinary rounds  •   concerns      Treatment:    The following diet has been recommended:      PROVIDER Section:     By signing this assessment you are acknowledging and agree with the diagnosis/diagnoses assigned by the Registered Dietitian    Comments:  continue with Regular diet

## 2020-02-29 NOTE — PROGRESS NOTE ADULT - SUBJECTIVE AND OBJECTIVE BOX
Patient is a 79y old  Female who presents with a chief complaint of Urinary hesitancy, shortness of breath, fever (29 Feb 2020 18:14)      INTERVAL HPI/OVERNIGHT EVENTS:Patient  is  feeling much better  improved  respiratory status    T(C): 36.4 (02-29-20 @ 15:40), Max: 36.6 (02-28-20 @ 23:48)  HR: 77 (02-29-20 @ 17:04) (70 - 79)  BP: 117/52 (02-29-20 @ 15:40) (117/52 - 138/65)  RR: 17 (02-29-20 @ 15:40) (16 - 18)  SpO2: 95% (02-29-20 @ 17:04) (94% - 96%)  Wt(kg): --Vital Signs Last 24 Hrs  T(C): 36.4 (29 Feb 2020 15:40), Max: 36.6 (28 Feb 2020 23:48)  T(F): 97.6 (29 Feb 2020 15:40), Max: 97.8 (28 Feb 2020 23:48)  HR: 77 (29 Feb 2020 17:04) (70 - 79)  BP: 117/52 (29 Feb 2020 15:40) (117/52 - 138/65)  BP(mean): --  RR: 17 (29 Feb 2020 15:40) (16 - 18)  SpO2: 95% (29 Feb 2020 17:04) (94% - 96%)  I&O's Summary      LABS:                        12.9   6.35  )-----------( 323      ( 28 Feb 2020 06:49 )             39.9     02-28    138  |  104  |  12  ----------------------------<  170<H>  3.9   |  25  |  0.86    Ca    9.3      28 Feb 2020 06:49          CAPILLARY BLOOD GLUCOSE            MEDICATIONS  (STANDING):  albuterol/ipratropium for Nebulization 3 milliLiter(s) Nebulizer every 6 hours  atorvastatin 20 milliGRAM(s) Oral at bedtime  azithromycin  IVPB 500 milliGRAM(s) IV Intermittent every 24 hours  cefTRIAXone   IVPB 1000 milliGRAM(s) IV Intermittent every 24 hours  enoxaparin Injectable 40 milliGRAM(s) SubCutaneous daily  levothyroxine 50 MICROGram(s) Oral daily  melatonin 3 milliGRAM(s) Oral at bedtime  methylPREDNISolone sodium succinate Injectable 40 milliGRAM(s) IV Push every 8 hours  montelukast 10 milliGRAM(s) Oral daily  polyethylene glycol 3350 17 Gram(s) Oral daily  sodium chloride 0.9%. 1000 milliLiter(s) (90 mL/Hr) IV Continuous <Continuous>  sodium chloride 3%  Inhalation 4 milliLiter(s) Inhalation every 6 hours    MEDICATIONS  (PRN):    REVIEW OF SYSTEMS:  CONSTITUTIONAL: No fever, weight loss, or fatigue  EYES: No eye pain, visual disturbances, or discharge  ENMT:  No difficulty hearing, tinnitus, vertigo; No sinus or throat pain  NECK: No pain or stiffness  BREASTS: No pain, masses, or nipple discharge  RESPIRATORY: + cough, wheezing, chills or hemoptysis; + shortness of breath  CARDIOVASCULAR: No chest pain, palpitations, dizziness, or leg swelling  GASTROINTESTINAL: No abdominal or epigastric pain. No nausea, vomiting, or hematemesis; No diarrhea or constipation. No melena or hematochezia.  GENITOURINARY: No dysuria, frequency, hematuria, or incontinence  NEUROLOGICAL: No headaches, memory loss, loss of strength, numbness, or tremors  SKIN: No itching, burning, rashes, or lesions   LYMPH NODES: No enlarged glands  ENDOCRINE: No heat or cold intolerance; No hair loss  MUSCULOSKELETAL: No joint pain or swelling; No muscle, back, or extremity pain  PSYCHIATRIC: No depression, anxiety, mood swings, or difficulty sleeping  HEME/LYMPH: No easy bruising, or bleeding gums  ALLERGY AND IMMUNOLOGIC: No hives or eczema    RADIOLOGY & ADDITIONAL TESTS:    Imaging Personally Reviewed:  [ ] YES  [ ] NO    Consultant(s) Notes Reviewed:  [x ] YES  [ ] NO    PHYSICAL EXAM:  GENERAL: NAD, well-groomed, well-developed  HEAD:  Atraumatic, Normocephalic  EYES: EOMI, PERRLA, conjunctiva and sclera clear  ENMT: No tonsillar erythema, exudates, or enlargement; Moist mucous membranes, Good dentition, No lesions  NECK: Supple, No JVD, Normal thyroid  NERVOUS SYSTEM:  Alert & Oriented X3, Good concentration; Motor Strength 5/5 B/L upper and lower extremities; DTRs 2+ intact and symmetric  CHEST/LUNG: b/l, rhonchi, wheezing, no rubs  HEART: Regular rate and rhythm; No murmurs, rubs, or gallops  ABDOMEN: Soft, Nontender, Nondistended; Bowel sounds present  EXTREMITIES:  2+ Peripheral Pulses, No clubbing, cyanosis, or edema  LYMPH: No lymphadenopathy noted  SKIN: No rashes or lesions    Care Discussed with Consultants/Other Providers [ x] YES  [ ] NO

## 2020-02-29 NOTE — DIETITIAN INITIAL EVALUATION ADULT. - PROBLEM SELECTOR PLAN 1
Chest x-ray shows right basilar infiltrate  WBC: 15.1  Continue Ceftriaxone and azithromycin  F/u Blood culture, serum procalcitonin, urine legionella antigen

## 2020-02-29 NOTE — DIETITIAN INITIAL EVALUATION ADULT. - SIGNS/SYMPTOMS
likely low po intake PTA, No weight loss reported , BMI=17.7 likely low po intake PTA despite patient report, No weight loss reported , BMI=17.7

## 2020-02-29 NOTE — PROGRESS NOTE ADULT - SUBJECTIVE AND OBJECTIVE BOX
Time of Visit:  Patient seen and examined.     MEDICATIONS  (STANDING):  albuterol/ipratropium for Nebulization 3 milliLiter(s) Nebulizer every 6 hours  atorvastatin 20 milliGRAM(s) Oral at bedtime  azithromycin  IVPB 500 milliGRAM(s) IV Intermittent every 24 hours  cefTRIAXone   IVPB 1000 milliGRAM(s) IV Intermittent every 24 hours  enoxaparin Injectable 40 milliGRAM(s) SubCutaneous daily  levothyroxine 50 MICROGram(s) Oral daily  melatonin 3 milliGRAM(s) Oral at bedtime  methylPREDNISolone sodium succinate Injectable 40 milliGRAM(s) IV Push every 8 hours  montelukast 10 milliGRAM(s) Oral daily  polyethylene glycol 3350 17 Gram(s) Oral daily  sodium chloride 0.9%. 1000 milliLiter(s) (90 mL/Hr) IV Continuous <Continuous>  sodium chloride 3%  Inhalation 4 milliLiter(s) Inhalation every 6 hours      MEDICATIONS  (PRN):       Medications up to date at time of exam.      PHYSICAL EXAMINATION:  Patient has no new complaints.  GENERAL: The patient is a well-developed, well-nourished, in no apparent distress.     Vital Signs Last 24 Hrs  T(C): 36.4 (29 Feb 2020 15:40), Max: 36.6 (28 Feb 2020 23:48)  T(F): 97.6 (29 Feb 2020 15:40), Max: 97.8 (28 Feb 2020 23:48)  HR: 77 (29 Feb 2020 17:04) (70 - 79)  BP: 117/52 (29 Feb 2020 15:40) (117/52 - 138/65)  BP(mean): --  RR: 17 (29 Feb 2020 15:40) (16 - 18)  SpO2: 95% (29 Feb 2020 17:04) (94% - 96%)   (if applicable)    Chest Tube (if applicable)    HEENT: Head is normocephalic and atraumatic. Extraocular muscles are intact. Mucous membranes are moist.     NECK: Supple, no palpable adenopathy.    LUNGS: Clear to auscultation, + b/l rhonchi.    HEART: Regular rate and rhythm without murmur.    ABDOMEN: Soft, nontender, and nondistended.  No hepatosplenomegaly is noted.    : No painful voiding, no pelvic pain    EXTREMITIES: Without any cyanosis, clubbing, rash, lesions or edema.    NEUROLOGIC: Awake, alert, oriented, grossly intact    SKIN: Warm, dry, good turgor.      LABS:                        12.9   6.35  )-----------( 323      ( 28 Feb 2020 06:49 )             39.9     02-28    138  |  104  |  12  ----------------------------<  170<H>  3.9   |  25  |  0.86    Ca    9.3      28 Feb 2020 06:49                      Procalcitonin, Serum: 0.53 ng/mL (02-26-20 @ 18:37)      MICROBIOLOGY: (if applicable)    RADIOLOGY & ADDITIONAL STUDIES:  EKG:   CXR:  ECHO:    IMPRESSION: 79y Female PAST MEDICAL & SURGICAL HISTORY:  E-coli UTI  Chronic bronchitis  Dyslipidemia  Pneumonia  Hypothyroid  S/P appendectomy   p/w         Pt is an 78 y/o F, from home, ambulates independently,  with bronchiectasis, UTI, hypothyroidism, e.coli bacteremia,  presented with urinary hesitancy and incomplete voiding of urine since 4 days. According to the pt, since Sunday  ( 4 days back) she has been feeling incomplete voiding sensation and hesitancy. She stated that she has  been having decreased urination. She mentioned that her urine frequency is usually high. She does not have burning sensation while passing urine or dysuria. She also has nausea and decreased appetite since few days. Since same time period, she has been having shortness of breath which is worse on exertion. Denies orthopnea. She had fever yesterday with Tmax; 100.2 at home. She denies chest pain, cough, abdominal pain, recent sick contacts, recent travel and other complains except mentioned above.  CT chest reviewed . Clinically pat feels better    --    Patient presents with UTI, SOB, she has a pmhx of bronchiectasis and is currently being managed by Dr. Butler at [facility].  SOB likely 2/2 PNA and bronchiectasis    +leukocytosis 2/2 PNA, UTI  +bronchiectasis   +low grade fever    SUGGESTION:   - airway clearance vest   - bronchodilators, o2 supp prn    - start solumedrol taper   - RVP, bcx   - con't w/ abx   - DVT and GI prophylaxis.     discussed with medical team  and  at bedside

## 2020-02-29 NOTE — DIETITIAN INITIAL EVALUATION ADULT. - PERTINENT LABORATORY DATA
02-28 Na138 mmol/L Glu 170 mg/dL<H> K+ 3.9 mmol/L Cr  0.86 mg/dL BUN 12 mg/dL 02-27 Phos 2.8 mg/dL 02-26 Alb 3.3 g/dL<L> 02-27 [id number] 5.6 % 02-27 Chol 87 mg/dL LDL 40 mg/dL HDL 40 mg/dL<L> Trig 36 mg/dL

## 2020-03-01 ENCOUNTER — TRANSCRIPTION ENCOUNTER (OUTPATIENT)
Age: 80
End: 2020-03-01

## 2020-03-01 VITALS — OXYGEN SATURATION: 96 %

## 2020-03-01 LAB
ALBUMIN SERPL ELPH-MCNC: 3 G/DL — LOW (ref 3.5–5)
ALP SERPL-CCNC: 89 U/L — SIGNIFICANT CHANGE UP (ref 40–120)
ALT FLD-CCNC: 50 U/L DA — SIGNIFICANT CHANGE UP (ref 10–60)
ANION GAP SERPL CALC-SCNC: 6 MMOL/L — SIGNIFICANT CHANGE UP (ref 5–17)
AST SERPL-CCNC: 24 U/L — SIGNIFICANT CHANGE UP (ref 10–40)
BILIRUB SERPL-MCNC: 0.3 MG/DL — SIGNIFICANT CHANGE UP (ref 0.2–1.2)
BUN SERPL-MCNC: 17 MG/DL — SIGNIFICANT CHANGE UP (ref 7–18)
CALCIUM SERPL-MCNC: 8.8 MG/DL — SIGNIFICANT CHANGE UP (ref 8.4–10.5)
CHLORIDE SERPL-SCNC: 106 MMOL/L — SIGNIFICANT CHANGE UP (ref 96–108)
CO2 SERPL-SCNC: 27 MMOL/L — SIGNIFICANT CHANGE UP (ref 22–31)
CREAT SERPL-MCNC: 0.86 MG/DL — SIGNIFICANT CHANGE UP (ref 0.5–1.3)
GLUCOSE SERPL-MCNC: 131 MG/DL — HIGH (ref 70–99)
HCT VFR BLD CALC: 35.3 % — SIGNIFICANT CHANGE UP (ref 34.5–45)
HGB BLD-MCNC: 11.6 G/DL — SIGNIFICANT CHANGE UP (ref 11.5–15.5)
MCHC RBC-ENTMCNC: 31.4 PG — SIGNIFICANT CHANGE UP (ref 27–34)
MCHC RBC-ENTMCNC: 32.9 GM/DL — SIGNIFICANT CHANGE UP (ref 32–36)
MCV RBC AUTO: 95.4 FL — SIGNIFICANT CHANGE UP (ref 80–100)
NRBC # BLD: 0 /100 WBCS — SIGNIFICANT CHANGE UP (ref 0–0)
PLATELET # BLD AUTO: 390 K/UL — SIGNIFICANT CHANGE UP (ref 150–400)
POTASSIUM SERPL-MCNC: 3.9 MMOL/L — SIGNIFICANT CHANGE UP (ref 3.5–5.3)
POTASSIUM SERPL-SCNC: 3.9 MMOL/L — SIGNIFICANT CHANGE UP (ref 3.5–5.3)
PROT SERPL-MCNC: 6.9 G/DL — SIGNIFICANT CHANGE UP (ref 6–8.3)
RBC # BLD: 3.7 M/UL — LOW (ref 3.8–5.2)
RBC # FLD: 14 % — SIGNIFICANT CHANGE UP (ref 10.3–14.5)
SODIUM SERPL-SCNC: 139 MMOL/L — SIGNIFICANT CHANGE UP (ref 135–145)
WBC # BLD: 9.12 K/UL — SIGNIFICANT CHANGE UP (ref 3.8–10.5)
WBC # FLD AUTO: 9.12 K/UL — SIGNIFICANT CHANGE UP (ref 3.8–10.5)

## 2020-03-01 PROCEDURE — 84443 ASSAY THYROID STIM HORMONE: CPT

## 2020-03-01 PROCEDURE — 96376 TX/PRO/DX INJ SAME DRUG ADON: CPT

## 2020-03-01 PROCEDURE — 96375 TX/PRO/DX INJ NEW DRUG ADDON: CPT

## 2020-03-01 PROCEDURE — 96366 THER/PROPH/DIAG IV INF ADDON: CPT

## 2020-03-01 PROCEDURE — 83036 HEMOGLOBIN GLYCOSYLATED A1C: CPT

## 2020-03-01 PROCEDURE — 36415 COLL VENOUS BLD VENIPUNCTURE: CPT

## 2020-03-01 PROCEDURE — 87631 RESP VIRUS 3-5 TARGETS: CPT

## 2020-03-01 PROCEDURE — 94640 AIRWAY INHALATION TREATMENT: CPT

## 2020-03-01 PROCEDURE — 85610 PROTHROMBIN TIME: CPT

## 2020-03-01 PROCEDURE — 71250 CT THORAX DX C-: CPT

## 2020-03-01 PROCEDURE — 83935 ASSAY OF URINE OSMOLALITY: CPT

## 2020-03-01 PROCEDURE — 85027 COMPLETE CBC AUTOMATED: CPT

## 2020-03-01 PROCEDURE — 83930 ASSAY OF BLOOD OSMOLALITY: CPT

## 2020-03-01 PROCEDURE — 87086 URINE CULTURE/COLONY COUNT: CPT

## 2020-03-01 PROCEDURE — 80053 COMPREHEN METABOLIC PANEL: CPT

## 2020-03-01 PROCEDURE — 96365 THER/PROPH/DIAG IV INF INIT: CPT

## 2020-03-01 PROCEDURE — 82306 VITAMIN D 25 HYDROXY: CPT

## 2020-03-01 PROCEDURE — 71046 X-RAY EXAM CHEST 2 VIEWS: CPT

## 2020-03-01 PROCEDURE — 99285 EMERGENCY DEPT VISIT HI MDM: CPT | Mod: 25

## 2020-03-01 PROCEDURE — 81001 URINALYSIS AUTO W/SCOPE: CPT

## 2020-03-01 PROCEDURE — 82607 VITAMIN B-12: CPT

## 2020-03-01 PROCEDURE — 87449 NOS EACH ORGANISM AG IA: CPT

## 2020-03-01 PROCEDURE — 80061 LIPID PANEL: CPT

## 2020-03-01 PROCEDURE — 80048 BASIC METABOLIC PNL TOTAL CA: CPT

## 2020-03-01 PROCEDURE — 82436 ASSAY OF URINE CHLORIDE: CPT

## 2020-03-01 PROCEDURE — 83605 ASSAY OF LACTIC ACID: CPT

## 2020-03-01 PROCEDURE — 84100 ASSAY OF PHOSPHORUS: CPT

## 2020-03-01 PROCEDURE — 84300 ASSAY OF URINE SODIUM: CPT

## 2020-03-01 PROCEDURE — 85730 THROMBOPLASTIN TIME PARTIAL: CPT

## 2020-03-01 PROCEDURE — 84145 PROCALCITONIN (PCT): CPT

## 2020-03-01 PROCEDURE — 87040 BLOOD CULTURE FOR BACTERIA: CPT

## 2020-03-01 PROCEDURE — 84156 ASSAY OF PROTEIN URINE: CPT

## 2020-03-01 PROCEDURE — 83735 ASSAY OF MAGNESIUM: CPT

## 2020-03-01 PROCEDURE — 99238 HOSP IP/OBS DSCHRG MGMT 30/<: CPT

## 2020-03-01 PROCEDURE — 82570 ASSAY OF URINE CREATININE: CPT

## 2020-03-01 RX ORDER — CEFUROXIME AXETIL 250 MG
1 TABLET ORAL
Qty: 10 | Refills: 0
Start: 2020-03-01 | End: 2020-03-05

## 2020-03-01 RX ADMIN — Medication 3 MILLILITER(S): at 03:57

## 2020-03-01 RX ADMIN — SODIUM CHLORIDE 4 MILLILITER(S): 9 INJECTION INTRAMUSCULAR; INTRAVENOUS; SUBCUTANEOUS at 03:57

## 2020-03-01 RX ADMIN — Medication 3 MILLILITER(S): at 08:54

## 2020-03-01 RX ADMIN — Medication 50 MICROGRAM(S): at 06:09

## 2020-03-01 RX ADMIN — Medication 40 MILLIGRAM(S): at 06:09

## 2020-03-01 RX ADMIN — SODIUM CHLORIDE 4 MILLILITER(S): 9 INJECTION INTRAMUSCULAR; INTRAVENOUS; SUBCUTANEOUS at 08:56

## 2020-03-01 RX ADMIN — MONTELUKAST 10 MILLIGRAM(S): 4 TABLET, CHEWABLE ORAL at 12:01

## 2020-03-01 NOTE — PROGRESS NOTE ADULT - SUBJECTIVE AND OBJECTIVE BOX
Patient is a 79y old  Female who presents with a chief complaint of Urinary hesitancy, shortness of breath, fever (29 Feb 2020 20:50)      INTERVAL HPI/OVERNIGHT EVENTS: improved  minimal cough  sob    T(C): 37.6 (03-01-20 @ 08:15), Max: 37.6 (03-01-20 @ 08:15)  HR: 71 (03-01-20 @ 10:17) (60 - 79)  BP: 142/66 (03-01-20 @ 08:15) (117/52 - 142/66)  RR: 17 (03-01-20 @ 08:15) (17 - 18)  SpO2: 96% (03-01-20 @ 10:17) (95% - 97%)  Wt(kg): --Vital Signs Last 24 Hrs  T(C): 37.6 (01 Mar 2020 08:15), Max: 37.6 (01 Mar 2020 08:15)  T(F): 99.6 (01 Mar 2020 08:15), Max: 99.6 (01 Mar 2020 08:15)  HR: 71 (01 Mar 2020 10:17) (60 - 79)  BP: 142/66 (01 Mar 2020 08:15) (117/52 - 142/66)  BP(mean): --  RR: 17 (01 Mar 2020 08:15) (17 - 18)  SpO2: 96% (01 Mar 2020 10:17) (95% - 97%)  I&O's Summary      LABS:                        11.6   9.12  )-----------( 390      ( 01 Mar 2020 09:13 )             35.3     03-01    139  |  106  |  17  ----------------------------<  131<H>  3.9   |  27  |  0.86    Ca    8.8      01 Mar 2020 09:13    TPro  6.9  /  Alb  3.0<L>  /  TBili  0.3  /  DBili  x   /  AST  24  /  ALT  50  /  AlkPhos  89  03-01        CAPILLARY BLOOD GLUCOSE    MEDICATIONS  (STANDING):  albuterol/ipratropium for Nebulization 3 milliLiter(s) Nebulizer every 6 hours  atorvastatin 20 milliGRAM(s) Oral at bedtime  azithromycin  IVPB 500 milliGRAM(s) IV Intermittent every 24 hours  cefTRIAXone   IVPB 1000 milliGRAM(s) IV Intermittent every 24 hours  enoxaparin Injectable 40 milliGRAM(s) SubCutaneous daily  levothyroxine 50 MICROGram(s) Oral daily  melatonin 3 milliGRAM(s) Oral at bedtime  methylPREDNISolone sodium succinate Injectable 40 milliGRAM(s) IV Push every 8 hours  montelukast 10 milliGRAM(s) Oral daily  polyethylene glycol 3350 17 Gram(s) Oral daily  sodium chloride 0.9%. 1000 milliLiter(s) (90 mL/Hr) IV Continuous <Continuous>  sodium chloride 3%  Inhalation 4 milliLiter(s) Inhalation every 6 hours    MEDICATIONS  (PRN):            REVIEW OF SYSTEMS:  CONSTITUTIONAL: No fever, weight loss, or fatigue  EYES: No eye pain, visual disturbances, or discharge  ENMT:  No difficulty hearing, tinnitus, vertigo; No sinus or throat pain  NECK: No pain or stiffness  BREASTS: No pain, masses, or nipple discharge  RESPIRATORY: +cough, wheezing, chills or hemoptysis; No shortness of breath  CARDIOVASCULAR: No chest pain, palpitations, dizziness, or leg swelling  GASTROINTESTINAL: No abdominal or epigastric pain. No nausea, vomiting, or hematemesis; No diarrhea or constipation. No melena or hematochezia.  GENITOURINARY: No dysuria, frequency, hematuria, or incontinence  NEUROLOGICAL: No headaches, memory loss, loss of strength, numbness, or tremors  SKIN: No itching, burning, rashes, or lesions   LYMPH NODES: No enlarged glands  ENDOCRINE: No heat or cold intolerance; No hair loss  MUSCULOSKELETAL: No joint pain or swelling; No muscle, back, or extremity pain  PSYCHIATRIC: No depression, anxiety, mood swings, or difficulty sleeping  HEME/LYMPH: No easy bruising, or bleeding gums  ALLERGY AND IMMUNOLOGIC: No hives or eczema    RADIOLOGY & ADDITIONAL TESTS:    Imaging Personally Reviewed:  [ ] YES  [ ] NO    Consultant(s) Notes Reviewed:  [x ] YES  [ ] NO    PHYSICAL EXAM:  GENERAL: NAD, well-groomed, well-developed  HEAD:  Atraumatic, Normocephalic  EYES: EOMI, PERRLA, conjunctiva and sclera clear  ENMT: No tonsillar erythema, exudates, or enlargement; Moist mucous membranes, Good dentition, No lesions  NECK: Supple, No JVD, Normal thyroid  NERVOUS SYSTEM:  Alert & Oriented X3, Good concentration; Motor Strength 5/5 B/L upper and lower extremities; DTRs 2+ intact and symmetric  CHEST/LUNG: improved air entry +rhonchi, wheezing, or rubs  HEART: Regular rate and rhythm; No murmurs, rubs, or gallops  ABDOMEN: Soft, Nontender, Nondistended; Bowel sounds present  EXTREMITIES:  2+ Peripheral Pulses, No clubbing, cyanosis, or edema  LYMPH: No lymphadenopathy noted  SKIN: No rashes or lesions    Care Discussed with Consultants/Other Providers [ x] YES  [ ] NO

## 2020-03-01 NOTE — DISCHARGE NOTE NURSING/CASE MANAGEMENT/SOCIAL WORK - PATIENT PORTAL LINK FT
You can access the FollowMyHealth Patient Portal offered by [facility] by registering at the following website: [email or web address]. By joining [employer]’s FollowMyHealth portal, you will also be able to view your health information using other applications (apps) compatible with our system.

## 2020-03-01 NOTE — PROGRESS NOTE ADULT - ATTENDING COMMENTS
Above  noted  Pt is an 78 y/o F, from home, ambulates independently,  with bronchiectasis, UTI, hypothyroidism, e.coli bacteremia,  presented with urinary hesitancy and incomplete voiding of urine since 4 days. According to the pt, since Sunday  ( 4 days back) she has been feeling incomplete voiding sensation and hesitancy. She stated that she has  been having decreased urination. She mentioned that her urine frequency is usually high. She does not have burning sensation while passing urine or dysuria. She also has nausea and decreased appetite since few days. Since same time period, she has been having shortness of breath which is worse on exertion. Denies orthopnea. She had fever yesterday with Tmax; 100.2 at home. She denies chest pain, cough, abdominal pain, recent sick contacts, recent travel and other complains except mentioned above.   blood test radiology  report  reviewed CT chest reviewed   Impression  Pneumonia  b/l   Chronic Bronchitis    UTI E coli   Hyponatremia  fluids  restriction monitor  BT improved    Bronchiectasia    Hypothyroidism     Pulmonary follow  up    GI DVT prophylaxis  nutritional support  Continue  antibiotics  as  prescribed  nutritional support smart Vast discharge  planning
Above  noted  Pt is an 78 y/o F, from home, ambulates independently,  with bronchiectasis, UTI, hypothyroidism, e.coli bacteremia,  presented with urinary hesitancy and incomplete voiding of urine since 4 days. According to the pt, since Sunday  ( 4 days back) she has been feeling incomplete voiding sensation and hesitancy. She stated that she has  been having decreased urination. She mentioned that her urine frequency is usually high. She does not have burning sensation while passing urine or dysuria. She also has nausea and decreased appetite since few days. Since same time period, she has been having shortness of breath which is worse on exertion. Denies orthopnea. She had fever yesterday with Tmax; 100.2 at home. She denies chest pain, cough, abdominal pain, recent sick contacts, recent travel and other complains except mentioned above.   blood test radiology  report  reviewed CT chest reviewed   Impression  Pneumonia  b/l   Chronic Bronchitis    UTI E coli   Hyponatremia  fluids  restriction monitor  BT improved    Bronchiectasia    Hypothyroidism     Pulmonary follow  up    GI DVT prophylaxis  nutritional support  Continue  antibiotics  as  prescribed  nutritional support smart Vast discharge  planning
Above  noted  Pt is an 78 y/o F, from home, ambulates independently,  with bronchiectasis, UTI, hypothyroidism, e.coli bacteremia,  presented with urinary hesitancy and incomplete voiding of urine since 4 days. According to the pt, since Sunday  ( 4 days back) she has been feeling incomplete voiding sensation and hesitancy. She stated that she has  been having decreased urination. She mentioned that her urine frequency is usually high. She does not have burning sensation while passing urine or dysuria. She also has nausea and decreased appetite since few days. Since same time period, she has been having shortness of breath which is worse on exertion. Denies orthopnea. She had fever yesterday with Tmax; 100.2 at home. She denies chest pain, cough, abdominal pain, recent sick contacts, recent travel and other complains except mentioned above.   blood test radiology  report  reviewed CT chest reviewed   Impression  Pneumonia  b/l resolving complete 10 days  antibiotics    Chronic Bronchitis    UTI E coli   Hyponatremia  fluids  restriction monitor  BT improved    Bronchiectasia    Hypothyroidism    Pulmonary follow  up    GI DVT prophylaxis  nutritional support  Continue  antibiotics  as  prescribed  nutritional support smart Vast discharge  planning home  today
Above  noted  Pt is an 78 y/o F, from home, ambulates independently,  with bronchiectasis, UTI, hypothyroidism, e.coli bacteremia,  presented with urinary hesitancy and incomplete voiding of urine since 4 days. According to the pt, since Sunday  ( 4 days back) she has been feeling incomplete voiding sensation and hesitancy. She stated that she has  been having decreased urination. She mentioned that her urine frequency is usually high. She does not have burning sensation while passing urine or dysuria. She also has nausea and decreased appetite since few days. Since same time period, she has been having shortness of breath which is worse on exertion. Denies orthopnea. She had fever yesterday with Tmax; 100.2 at home. She denies chest pain, cough, abdominal pain, recent sick contacts, recent travel and other complains except mentioned above.   blood test radiology  report  reviewed CT chest reviewed   Impression  Pneumonia    Chronic Bronchitis    UTI E coli   Hyponatremia  fluids  restriction monitor  BT improved    Bronchiectasia    Hypothyroidism     Pulmonary evaluation noted    GI DVT prophylaxis  nutritional support  Continue  antibiotics  as  prescribed  nutritional support

## 2020-03-01 NOTE — PROGRESS NOTE ADULT - REASON FOR ADMISSION
Urinary hesitancy, shortness of breath, fever

## 2020-03-01 NOTE — PROGRESS NOTE ADULT - SUBJECTIVE AND OBJECTIVE BOX
Time of Visit:  Patient seen and examined.     MEDICATIONS  (STANDING):  albuterol/ipratropium for Nebulization 3 milliLiter(s) Nebulizer every 6 hours  atorvastatin 20 milliGRAM(s) Oral at bedtime  azithromycin  IVPB 500 milliGRAM(s) IV Intermittent every 24 hours  cefTRIAXone   IVPB 1000 milliGRAM(s) IV Intermittent every 24 hours  enoxaparin Injectable 40 milliGRAM(s) SubCutaneous daily  levothyroxine 50 MICROGram(s) Oral daily  melatonin 3 milliGRAM(s) Oral at bedtime  montelukast 10 milliGRAM(s) Oral daily  polyethylene glycol 3350 17 Gram(s) Oral daily  sodium chloride 0.9%. 1000 milliLiter(s) (90 mL/Hr) IV Continuous <Continuous>  sodium chloride 3%  Inhalation 4 milliLiter(s) Inhalation every 6 hours      MEDICATIONS  (PRN):       Medications up to date at time of exam.      PHYSICAL EXAMINATION:  Patient has no new complaints.  GENERAL: The patient is a well-developed, well-nourished, in no apparent distress.     Vital Signs Last 24 Hrs  T(C): 37.6 (01 Mar 2020 08:15), Max: 37.6 (01 Mar 2020 08:15)  T(F): 99.6 (01 Mar 2020 08:15), Max: 99.6 (01 Mar 2020 08:15)  HR: 71 (01 Mar 2020 10:17) (60 - 79)  BP: 142/66 (01 Mar 2020 08:15) (117/52 - 142/66)  BP(mean): --  RR: 17 (01 Mar 2020 08:15) (17 - 18)  SpO2: 96% (01 Mar 2020 10:17) (95% - 97%)   (if applicable)    Chest Tube (if applicable)    HEENT: Head is normocephalic and atraumatic. Extraocular muscles are intact. Mucous membranes are moist.     NECK: Supple, no palpable adenopathy.    LUNGS: fair b/l breath sounds and b/l rhonchi with exp wheezing     HEART: Regular rate and rhythm without murmur.    ABDOMEN: Soft, nontender, and nondistended.  No hepatosplenomegaly is noted.    : No painful voiding, no pelvic pain    EXTREMITIES: Without any cyanosis, clubbing, rash, lesions or edema.    NEUROLOGIC: Awake, alert, oriented, grossly intact    SKIN: Warm, dry, good turgor.      LABS:                        11.6   9.12  )-----------( 390      ( 01 Mar 2020 09:13 )             35.3     03-01    139  |  106  |  17  ----------------------------<  131<H>  3.9   |  27  |  0.86    Ca    8.8      01 Mar 2020 09:13    TPro  6.9  /  Alb  3.0<L>  /  TBili  0.3  /  DBili  x   /  AST  24  /  ALT  50  /  AlkPhos  89  03-01                        MICROBIOLOGY: (if applicable)    RADIOLOGY & ADDITIONAL STUDIES:  EKG:   CXR:  ECHO:    IMPRESSION: 79y Female PAST MEDICAL & SURGICAL HISTORY:  E-coli UTI  Chronic bronchitis  Dyslipidemia  Pneumonia  Hypothyroid  S/P appendectomy   p/w           Pt is an 78 y/o F, from home, ambulates independently,  with bronchiectasis, UTI, hypothyroidism, e.coli bacteremia,  presented with urinary hesitancy and incomplete voiding of urine since 4 days. According to the pt, since Sunday  ( 4 days back) she has been feeling incomplete voiding sensation and hesitancy. She stated that she has  been having decreased urination. She mentioned that her urine frequency is usually high. She does not have burning sensation while passing urine or dysuria. She also has nausea and decreased appetite since few days. Since same time period, she has been having shortness of breath which is worse on exertion. Denies orthopnea. She had fever yesterday with Tmax; 100.2 at home. She denies chest pain, cough, abdominal pain, recent sick contacts, recent travel and other complains except mentioned above.  CT chest reviewed . Clinically pat feels better    --    Patient presents with UTI, SOB, she has a pmhx of bronchiectasis and is currently being managed by Dr. Butler at [facility].  SOB likely 2/2 PNA and bronchiectasis    +leukocytosis 2/2 PNA, UTI  +bronchiectasis   +low grade fever    SUGGESTION:   - airway clearance vest   - bronchodilators, o2 supp prn    - change solumedrol to prednisone 40 mg daily and taper   - RVP, bcx   - con't w/ abx   - DVT and GI prophylaxis.    - d/c planning    - out pat pulmp f/u with Dr Mckenna Gomes

## 2020-03-02 RX ORDER — CIPROFLOXACIN LACTATE 400MG/40ML
1 VIAL (ML) INTRAVENOUS
Qty: 5 | Refills: 0
Start: 2020-03-02 | End: 2020-03-06

## 2020-07-10 ENCOUNTER — APPOINTMENT (OUTPATIENT)
Dept: ULTRASOUND IMAGING | Facility: HOSPITAL | Age: 80
End: 2020-07-10

## 2020-07-24 NOTE — DISCHARGE NOTE PROVIDER - NS AS DC PROVIDER CONTACT Y/N MULTI
requesting medication refill. Please approve or deny this request.    Rx requested:  Requested Prescriptions     Pending Prescriptions Disp Refills    metoprolol tartrate (LOPRESSOR) 50 MG tablet [Pharmacy Med Name: Metoprolol Tartrate Oral Tablet 50 MG] 180 tablet 0     Sig: TAKE ONE-HALF TABLET BY MOUTH TWICE DAILY         Last Office Visit:   4/19/2019      Next Visit Date:  No future appointments.
Yes

## 2021-03-08 NOTE — ED ADULT NURSE NOTE - NS ED NURSE RECORD ANOTHER VITAL SIGN
Pt informed of MAZs recs and verbalized understanding. Order placed for pt to see DM ED and pt provided with # to call to set up appt. Yes

## 2022-07-05 NOTE — PATIENT PROFILE ADULT - DO YOU FEEL UNSAFE AT SCHOOL?
RN spoke with pt and informed her that [employer] shipped her Xiaflex and we can now schedule her appts for injection and manipulation. Appt scheduled August 3 at 1030 in [place] and August 5 at 0830 in [place]. She verbalized understanding.    not applicable

## 2022-08-02 NOTE — H&P ADULT - RESPIRATORY AND THORAX
Thank you for choosing [facility] at [facility].  It's a pleasure to be a part of your healthcare team.  Please let us know if you will need anything --- [phone].       You may receive a short survey from [facility] about this visit.  We would appreciate your feedback.    Have a great day!    Muriel GIPSON & Aliyah GIPSON   
details…

## 2023-01-09 NOTE — H&P ADULT - PROBLEM SELECTOR PLAN 1
2023       Brooklyn S DO Alexx  [address] [address]  Via Fax: [phone]      Patient: Tika Brewer   YOB: 2013   Date of Visit: 2023       Dear Dr. Coffey:    Thank you for referring Tika Brewer to me for evaluation. Below are my notes for this visit with her.    If you have questions, please do not hesitate to call me. I look forward to following your patient along with you.      Sincerely,        Rina Arriaza MD        CC: No Recipients  Rina Arriaza MD  2023  1:43 PM  Signed  Patient: Tika Brewer Date: 2023  : 2013   9 year old female     Chief Complaint   Patient presents with   • Office Visit   • Asthma        Visit Vitals  /64 (BP Location: LUE - Left upper extremity, Patient Position: Sitting, Cuff Size: Regular)   Pulse 86   Ht 5' 7.32\" (1.71 m)   Wt (!) 75 kg (165 lb 7.3 oz)   SpO2 98%   BMI 25.67 kg/m²       ALLERGIES:  Seasonal    Current Outpatient Medications   Medication Sig Dispense Refill   • Dulera 100-5 MCG/ACT inhaler INHALE 2 PUFFS INTO THE LUNGS TWICE DAILY 13 g 3   • Ventolin  (90 Base) MCG/ACT inhaler INHALE 2 PUFFS INTO THE LUNGS EVERY 4 HOURS AS NEEDED FOR SHORTNESS OF BREATH OR WHEEZING. MAY GIVE \"TIKA\" UP TO 4 PUFFS AS NEEDED 1 each 3   • Spacer/Aero-Holding Chambers (OptiChamber Julia-Md Mask) Misc as needed. Use with inhalers. 1 each 1   • fluticasone (FLONASE) 50 MCG/ACT nasal spray Spray 1 spray in each nostril.     • tiotropium (SPIRIVA RESPIMAT) 1.25 MCG/ACT inhaler Inhale 2 puffs into the lungs daily. 4 g 3   • predniSONE (DELTASONE) 10 MG tablet Take 3 tablets by moth twice a day for up to 5 days for flare ups. 30 tablet 0     No current facility-administered medications for this visit.       Social History     Socioeconomic History   • Marital status: Single     Spouse name: Not on file   • Number of children: Not on file   • Years of education: Not on file   • Highest education  level: Not on file   Occupational History   • Not on file   Tobacco Use   • Smoking status: Never   • Smokeless tobacco: Never   Substance and Sexual Activity   • Alcohol use: Not on file   • Drug use: Never   • Sexual activity: Never   Other Topics Concern   •  Service No   • Blood Transfusions No   • Caffeine Concern No   • Occupational Exposure No   • Hobby Hazards No   • Sleep Concern No   • Stress Concern No   • Weight Concern No   • Special Diet No   • Back Care No   • Exercise No   • Bike Helmet No   • Seat Belt No   • Self-Exams No   Social History Narrative   • Not on file     Social Determinants of Health     Financial Resource Strain: Not on file   Food Insecurity: Not on file   Transportation Needs: Not on file   Physical Activity: Not on file   Stress: Not on file   Social Connections: Not on file   Intimate Partner Violence: Not on file       Family History   Problem Relation Age of Onset   • Asthma Other        Past Surgical History:   Procedure Laterality Date   • No past surgeries         Past Medical History:   Diagnosis Date   • Asthma        Asthma  Pertinent negatives include no coughing, sore throat or wheezing. Her past medical history is significant for asthma.     Routine follow up  Since the last visit there were 2 occasions when she caught a viral infection that triggered asthma.  The strategy of using Dulera on demand plus albuterol worked well.  The symptoms resolved quickly and he did not have to start oral steroids  Sleep. uninterrupted  Activity. normal  ED/Hosp visits none  Need for TREE on a regular basis. None  Need for oral seroids. None    Review of Systems   Constitutional: Negative.    HENT: Positive for congestion. Negative for ear discharge and sore throat.    Eyes: Negative for discharge and redness.   Respiratory: Negative for cough, sputum production, shortness of breath and wheezing.    Cardiovascular: Negative.    Gastrointestinal: Negative for constipation,  diarrhea and heartburn.   Genitourinary: Negative.    Musculoskeletal: Negative.    Skin: Negative for itching and rash.   Neurological: Negative.    Endo/Heme/Allergies: Positive for environmental allergies.   Psychiatric/Behavioral: Negative for depression, substance abuse and suicidal ideas. The patient is not nervous/anxious.        Physical Exam  Constitutional:       General: She is active.      Comments: No clubbing   HENT:      Right Ear: Tympanic membrane normal.      Left Ear: Tympanic membrane normal.      Nose: Nose normal.      Mouth/Throat:      Pharynx: Oropharynx is clear.   Eyes:      General: Allergic shiner present.         Right eye: No discharge or erythema.         Left eye: No discharge or erythema.      Pupils: Pupils are equal, round, and reactive to light.   Neck:      Trachea: Trachea normal.   Cardiovascular:      Rate and Rhythm: Regular rhythm.      Heart sounds: S1 normal and S2 normal.   Pulmonary:      Effort: No tachypnea, bradypnea, accessory muscle usage, prolonged expiration, respiratory distress, nasal flaring or retractions.      Breath sounds: Normal breath sounds and air entry. No decreased air movement. No wheezing or rales.   Chest:      Chest wall: No deformity.   Abdominal:      General: Abdomen is flat. There is no distension.   Lymphadenopathy:      Cervical: No cervical adenopathy.   Skin:     General: Skin is warm and moist.      Findings: No rash.      Nails: There is no clubbing.   Neurological:      General: No focal deficit present.      Mental Status: She is alert.      Sensory: Sensation is intact.      Motor: Motor function is intact.      Coordination: Coordination is intact.   Psychiatric:         Attention and Perception: Attention normal.         Mood and Affect: Mood and affect normal.           ASSESSMENT/PLAN    Diagnoses and all orders for this visit:    Moderate persistent asthma without complication  -Although we have her on a combination inhaler  which should on the label of moderate persistent, her asthma is behaving in a much more sporadic or intermittent fashion.  So at this time there is no change in management plan    Seasonal allergic rhinitis, unspecified trigger  - in renission    Exertional asthma  - not active in summer    RTC 4 months  Time spent talking to the parent for this phone visit: 30 minutes  Rina Arriaza MD  Pediatric Pulmonology/Allergy Immunology  [facility]  Director [facility]  [facility]     Chest x-ray shows right basilar infiltrate  WBC: 15.1  Continue Ceftriaxone and azithromycin  F/u Blood culture, serum procalcitonin, urine legionella antigen

## 2023-12-14 NOTE — ED ADULT NURSE NOTE - CAS TRG GENERAL AIRWAY, MLM
Chronic, controlled. Latest blood pressure and vitals reviewed-     Temp:  [97.2 °F (36.2 °C)-98.4 °F (36.9 °C)]   Pulse:  []   Resp:  [16-33]   BP: (113-192)/()   SpO2:  [91 %-97 %] .   Home meds for hypertension were reviewed and noted below.   Hypertension Medications               carvediloL (COREG) 3.125 MG tablet Take 3.125 mg by mouth 2 (two) times daily.    furosemide (LASIX) 40 MG tablet Take 40 mg by mouth 2 (two) times daily as needed.    lisinopriL 10 MG tablet Take 5 mg by mouth once daily.            While in the hospital, will manage blood pressure as follows; Continue home antihypertensive regimen    Will utilize p.r.n. blood pressure medication only if patient's blood pressure greater than 180/110 and she develops symptoms such as worsening chest pain or shortness of breath.   Patent

## 2025-04-28 PROBLEM — Z00.00 ENCOUNTER FOR PREVENTIVE HEALTH EXAMINATION: Status: ACTIVE | Noted: 2025-04-28

## 2025-05-06 ENCOUNTER — APPOINTMENT (OUTPATIENT)
Dept: RADIOLOGY | Facility: CLINIC | Age: 85
End: 2025-05-06
Payer: SELF-PAY

## 2025-05-06 PROCEDURE — 77080 DXA BONE DENSITY AXIAL: CPT
